# Patient Record
Sex: MALE | Race: WHITE | Employment: OTHER | URBAN - METROPOLITAN AREA
[De-identification: names, ages, dates, MRNs, and addresses within clinical notes are randomized per-mention and may not be internally consistent; named-entity substitution may affect disease eponyms.]

---

## 2018-04-16 ENCOUNTER — HOSPITAL ENCOUNTER (OUTPATIENT)
Dept: PREADMISSION TESTING | Age: 63
Discharge: HOME | End: 2018-04-16
Payer: COMMERCIAL

## 2018-04-16 VITALS
WEIGHT: 275 LBS | OXYGEN SATURATION: 95 % | HEIGHT: 69 IN | TEMPERATURE: 98 F | BODY MASS INDEX: 40.73 KG/M2 | HEART RATE: 74 BPM | DIASTOLIC BLOOD PRESSURE: 81 MMHG | SYSTOLIC BLOOD PRESSURE: 137 MMHG

## 2018-04-16 LAB
ABO + RH BLD: NORMAL
ANION GAP SERPL CALC-SCNC: 7 MMOL/L (ref 5–15)
APPEARANCE UR: CLEAR
BACTERIA URNS QL MICRO: NEGATIVE /HPF
BILIRUB UR QL: NEGATIVE
BLOOD GROUP ANTIBODIES SERPL: NORMAL
BUN SERPL-MCNC: 18 MG/DL (ref 6–20)
BUN/CREAT SERPL: 16 (ref 12–20)
CALCIUM SERPL-MCNC: 9.1 MG/DL (ref 8.5–10.1)
CHLORIDE SERPL-SCNC: 104 MMOL/L (ref 97–108)
CO2 SERPL-SCNC: 29 MMOL/L (ref 21–32)
COLOR UR: NORMAL
CREAT SERPL-MCNC: 1.14 MG/DL (ref 0.7–1.3)
EPITH CASTS URNS QL MICRO: NORMAL /LPF
ERYTHROCYTE [DISTWIDTH] IN BLOOD BY AUTOMATED COUNT: 13.1 % (ref 11.5–14.5)
EST. AVERAGE GLUCOSE BLD GHB EST-MCNC: 120 MG/DL
GLUCOSE SERPL-MCNC: 103 MG/DL (ref 65–100)
GLUCOSE UR STRIP.AUTO-MCNC: NEGATIVE MG/DL
HBA1C MFR BLD: 5.8 % (ref 4.2–6.3)
HCT VFR BLD AUTO: 44.2 % (ref 36.6–50.3)
HGB BLD-MCNC: 14.8 G/DL (ref 12.1–17)
HGB UR QL STRIP: NEGATIVE
INR PPP: 1 (ref 0.9–1.1)
KETONES UR QL STRIP.AUTO: NEGATIVE MG/DL
LEUKOCYTE ESTERASE UR QL STRIP.AUTO: NEGATIVE
MCH RBC QN AUTO: 31.3 PG (ref 26–34)
MCHC RBC AUTO-ENTMCNC: 33.5 G/DL (ref 30–36.5)
MCV RBC AUTO: 93.4 FL (ref 80–99)
NITRITE UR QL STRIP.AUTO: NEGATIVE
NRBC # BLD: 0 K/UL (ref 0–0.01)
NRBC BLD-RTO: 0 PER 100 WBC
PH UR STRIP: 6.5 [PH]
PLATELET # BLD AUTO: 234 K/UL (ref 150–400)
PMV BLD AUTO: 9.6 FL (ref 8.9–12.9)
POTASSIUM SERPL-SCNC: 4.1 MMOL/L (ref 3.5–5.1)
PROT UR STRIP-MCNC: NEGATIVE MG/DL
PROTHROMBIN TIME: 10.2 SEC (ref 9–11.1)
RBC # BLD AUTO: 4.73 M/UL (ref 4.1–5.7)
RBC #/AREA URNS HPF: NORMAL /HPF (ref 0–5)
SODIUM SERPL-SCNC: 140 MMOL/L (ref 136–145)
SP GR UR REFRACTOMETRY: 1.02
SPECIMEN EXP DATE BLD: NORMAL
UA: UC IF INDICATED,UAUC: NORMAL
UROBILINOGEN UR QL STRIP.AUTO: 0.2 EU/DL
WBC # BLD AUTO: 7.8 K/UL (ref 4.1–11.1)
WBC URNS QL MICRO: NORMAL /HPF (ref 0–4)

## 2018-04-16 PROCEDURE — 85610 PROTHROMBIN TIME: CPT | Performed by: ORTHOPAEDIC SURGERY

## 2018-04-16 PROCEDURE — 85027 COMPLETE CBC AUTOMATED: CPT | Performed by: ORTHOPAEDIC SURGERY

## 2018-04-16 PROCEDURE — 80048 BASIC METABOLIC PNL TOTAL CA: CPT | Performed by: ORTHOPAEDIC SURGERY

## 2018-04-16 PROCEDURE — 86900 BLOOD TYPING SEROLOGIC ABO: CPT | Performed by: ORTHOPAEDIC SURGERY

## 2018-04-16 PROCEDURE — 83036 HEMOGLOBIN GLYCOSYLATED A1C: CPT | Performed by: ORTHOPAEDIC SURGERY

## 2018-04-16 PROCEDURE — 81001 URINALYSIS AUTO W/SCOPE: CPT | Performed by: ORTHOPAEDIC SURGERY

## 2018-04-16 RX ORDER — ASPIRIN 81 MG/1
81 TABLET ORAL DAILY
COMMUNITY
End: 2018-04-24

## 2018-04-16 RX ORDER — PRAVASTATIN SODIUM 20 MG/1
20 TABLET ORAL DAILY
COMMUNITY

## 2018-04-16 RX ORDER — VALSARTAN AND HYDROCHLOROTHIAZIDE 80; 12.5 MG/1; MG/1
1 TABLET, FILM COATED ORAL DAILY
COMMUNITY
End: 2020-06-11

## 2018-04-16 RX ORDER — LEVOTHYROXINE SODIUM 100 UG/1
100 TABLET ORAL
COMMUNITY

## 2018-04-16 RX ORDER — IBUPROFEN 200 MG
200 TABLET ORAL
COMMUNITY
End: 2018-04-24

## 2018-04-16 RX ORDER — CLOBETASOL PROPIONATE 0.5 MG/ML
LOTION TOPICAL AS NEEDED
COMMUNITY
End: 2022-06-30

## 2018-04-16 RX ORDER — MINERAL OIL
180 ENEMA (ML) RECTAL
COMMUNITY
End: 2020-06-11

## 2018-04-17 LAB
BACTERIA SPEC CULT: NORMAL
BACTERIA SPEC CULT: NORMAL
SERVICE CMNT-IMP: NORMAL

## 2018-04-18 RX ORDER — CEFAZOLIN SODIUM/WATER 2 G/20 ML
2 SYRINGE (ML) INTRAVENOUS ONCE
Status: CANCELLED | OUTPATIENT
Start: 2018-04-23 | End: 2018-04-23

## 2018-04-18 RX ORDER — CELECOXIB 200 MG/1
200 CAPSULE ORAL ONCE
Status: CANCELLED | OUTPATIENT
Start: 2018-04-23 | End: 2018-04-23

## 2018-04-18 RX ORDER — PREGABALIN 75 MG/1
75 CAPSULE ORAL ONCE
Status: CANCELLED | OUTPATIENT
Start: 2018-04-23 | End: 2018-04-23

## 2018-04-18 RX ORDER — ACETAMINOPHEN 500 MG
1000 TABLET ORAL ONCE
Status: CANCELLED | OUTPATIENT
Start: 2018-04-23 | End: 2018-04-23

## 2018-04-18 RX ORDER — DEXAMETHASONE SODIUM PHOSPHATE 10 MG/ML
4 INJECTION INTRAMUSCULAR; INTRAVENOUS ONCE
Status: CANCELLED | OUTPATIENT
Start: 2018-04-23 | End: 2018-04-23

## 2018-04-22 ENCOUNTER — ANESTHESIA EVENT (OUTPATIENT)
Dept: SURGERY | Age: 63
End: 2018-04-22
Payer: COMMERCIAL

## 2018-04-23 ENCOUNTER — ANESTHESIA (OUTPATIENT)
Dept: SURGERY | Age: 63
End: 2018-04-23
Payer: COMMERCIAL

## 2018-04-23 ENCOUNTER — HOSPITAL ENCOUNTER (OUTPATIENT)
Age: 63
Setting detail: OBSERVATION
Discharge: HOME | End: 2018-04-24
Attending: ORTHOPAEDIC SURGERY | Admitting: ORTHOPAEDIC SURGERY
Payer: COMMERCIAL

## 2018-04-23 DIAGNOSIS — M17.12 PRIMARY OSTEOARTHRITIS OF LEFT KNEE: Primary | ICD-10-CM

## 2018-04-23 LAB
GLUCOSE BLD STRIP.AUTO-MCNC: 103 MG/DL (ref 65–100)
SERVICE CMNT-IMP: ABNORMAL

## 2018-04-23 PROCEDURE — 74011000250 HC RX REV CODE- 250: Performed by: ORTHOPAEDIC SURGERY

## 2018-04-23 PROCEDURE — 77030028224 HC PDNG CST BSNM -A: Performed by: ORTHOPAEDIC SURGERY

## 2018-04-23 PROCEDURE — 77030010783 HC BOWL MX BN CEM J&J -B: Performed by: ORTHOPAEDIC SURGERY

## 2018-04-23 PROCEDURE — 77030039266 HC ADH SKN EXOFIN S2SG -A: Performed by: ORTHOPAEDIC SURGERY

## 2018-04-23 PROCEDURE — 77030020782 HC GWN BAIR PAWS FLX 3M -B

## 2018-04-23 PROCEDURE — C1776 JOINT DEVICE (IMPLANTABLE): HCPCS | Performed by: ORTHOPAEDIC SURGERY

## 2018-04-23 PROCEDURE — 76010000172 HC OR TIME 2.5 TO 3 HR INTENSV-TIER 1: Performed by: ORTHOPAEDIC SURGERY

## 2018-04-23 PROCEDURE — 76210000016 HC OR PH I REC 1 TO 1.5 HR: Performed by: ORTHOPAEDIC SURGERY

## 2018-04-23 PROCEDURE — 77030034850: Performed by: ORTHOPAEDIC SURGERY

## 2018-04-23 PROCEDURE — 97161 PT EVAL LOW COMPLEX 20 MIN: CPT

## 2018-04-23 PROCEDURE — 77030000032 HC CUF TRNQT ZIMM -B: Performed by: ORTHOPAEDIC SURGERY

## 2018-04-23 PROCEDURE — C9290 INJ, BUPIVACAINE LIPOSOME: HCPCS | Performed by: ORTHOPAEDIC SURGERY

## 2018-04-23 PROCEDURE — 77030018846 HC SOL IRR STRL H20 ICUM -A: Performed by: ORTHOPAEDIC SURGERY

## 2018-04-23 PROCEDURE — 77030003601 HC NDL NRV BLK BBMI -A

## 2018-04-23 PROCEDURE — 77030035236 HC SUT PDS STRATFX BARB J&J -B: Performed by: ORTHOPAEDIC SURGERY

## 2018-04-23 PROCEDURE — 74011250636 HC RX REV CODE- 250/636: Performed by: ORTHOPAEDIC SURGERY

## 2018-04-23 PROCEDURE — 77030018836 HC SOL IRR NACL ICUM -A: Performed by: ORTHOPAEDIC SURGERY

## 2018-04-23 PROCEDURE — 77030011943: Performed by: ORTHOPAEDIC SURGERY

## 2018-04-23 PROCEDURE — 76060000036 HC ANESTHESIA 2.5 TO 3 HR: Performed by: ORTHOPAEDIC SURGERY

## 2018-04-23 PROCEDURE — 74011250637 HC RX REV CODE- 250/637: Performed by: ORTHOPAEDIC SURGERY

## 2018-04-23 PROCEDURE — 74011000250 HC RX REV CODE- 250

## 2018-04-23 PROCEDURE — 77030031139 HC SUT VCRL2 J&J -A: Performed by: ORTHOPAEDIC SURGERY

## 2018-04-23 PROCEDURE — 77030020788: Performed by: ORTHOPAEDIC SURGERY

## 2018-04-23 PROCEDURE — 51798 US URINE CAPACITY MEASURE: CPT

## 2018-04-23 PROCEDURE — 74011250636 HC RX REV CODE- 250/636

## 2018-04-23 PROCEDURE — 77030007866 HC KT SPN ANES BBMI -B: Performed by: NURSE ANESTHETIST, CERTIFIED REGISTERED

## 2018-04-23 PROCEDURE — 82962 GLUCOSE BLOOD TEST: CPT

## 2018-04-23 PROCEDURE — 77030012935 HC DRSG AQUACEL BMS -B: Performed by: ORTHOPAEDIC SURGERY

## 2018-04-23 PROCEDURE — C1713 ANCHOR/SCREW BN/BN,TIS/BN: HCPCS | Performed by: ORTHOPAEDIC SURGERY

## 2018-04-23 PROCEDURE — 99218 HC RM OBSERVATION: CPT

## 2018-04-23 PROCEDURE — 77030006822 HC BLD SAW SAG BRSM -B: Performed by: ORTHOPAEDIC SURGERY

## 2018-04-23 PROCEDURE — 77030018842 HC SOL IRR SOD CL 9% BAXT -A: Performed by: ORTHOPAEDIC SURGERY

## 2018-04-23 PROCEDURE — 74011250636 HC RX REV CODE- 250/636: Performed by: ANESTHESIOLOGY

## 2018-04-23 PROCEDURE — 74011000258 HC RX REV CODE- 258: Performed by: ORTHOPAEDIC SURGERY

## 2018-04-23 PROCEDURE — 97116 GAIT TRAINING THERAPY: CPT

## 2018-04-23 PROCEDURE — 77030011640 HC PAD GRND REM COVD -A: Performed by: ORTHOPAEDIC SURGERY

## 2018-04-23 DEVICE — IMPLANTABLE DEVICE: Type: IMPLANTABLE DEVICE | Site: KNEE | Status: FUNCTIONAL

## 2018-04-23 DEVICE — CEMENT BNE 40GM FULL DOSE PMMA W/ GENT HI VISC RADPQ LNG: Type: IMPLANTABLE DEVICE | Site: KNEE | Status: FUNCTIONAL

## 2018-04-23 DEVICE — PAT ASYM TRIATHLN X3 38X11MM -- TRIATHLON ASYMMETRIC X3: Type: IMPLANTABLE DEVICE | Site: KNEE | Status: FUNCTIONAL

## 2018-04-23 DEVICE — COMPONENT KNEE CEM X3 TRIATHLON: Type: IMPLANTABLE DEVICE | Status: FUNCTIONAL

## 2018-04-23 RX ORDER — ACETAMINOPHEN 325 MG/1
650 TABLET ORAL EVERY 6 HOURS
Status: DISCONTINUED | OUTPATIENT
Start: 2018-04-23 | End: 2018-04-24 | Stop reason: HOSPADM

## 2018-04-23 RX ORDER — FACIAL-BODY WIPES
10 EACH TOPICAL DAILY PRN
Status: DISCONTINUED | OUTPATIENT
Start: 2018-04-25 | End: 2018-04-24 | Stop reason: HOSPADM

## 2018-04-23 RX ORDER — CEFAZOLIN SODIUM IN 0.9 % NACL 2 G/100 ML
PLASTIC BAG, INJECTION (ML) INTRAVENOUS AS NEEDED
Status: DISCONTINUED | OUTPATIENT
Start: 2018-04-23 | End: 2018-04-23 | Stop reason: HOSPADM

## 2018-04-23 RX ORDER — TRAMADOL HYDROCHLORIDE 50 MG/1
50 TABLET ORAL EVERY 6 HOURS
Status: DISCONTINUED | OUTPATIENT
Start: 2018-04-23 | End: 2018-04-24 | Stop reason: HOSPADM

## 2018-04-23 RX ORDER — PREGABALIN 75 MG/1
75 CAPSULE ORAL ONCE
Status: COMPLETED | OUTPATIENT
Start: 2018-04-23 | End: 2018-04-23

## 2018-04-23 RX ORDER — SODIUM CHLORIDE 0.9 % (FLUSH) 0.9 %
5-10 SYRINGE (ML) INJECTION EVERY 8 HOURS
Status: DISCONTINUED | OUTPATIENT
Start: 2018-04-23 | End: 2018-04-23 | Stop reason: HOSPADM

## 2018-04-23 RX ORDER — KETOROLAC TROMETHAMINE 30 MG/ML
30 INJECTION, SOLUTION INTRAMUSCULAR; INTRAVENOUS EVERY 6 HOURS
Status: DISCONTINUED | OUTPATIENT
Start: 2018-04-23 | End: 2018-04-23

## 2018-04-23 RX ORDER — OXYCODONE HYDROCHLORIDE 5 MG/1
5 TABLET ORAL
Status: DISCONTINUED | OUTPATIENT
Start: 2018-04-23 | End: 2018-04-24 | Stop reason: HOSPADM

## 2018-04-23 RX ORDER — HYDROCHLOROTHIAZIDE 25 MG/1
12.5 TABLET ORAL DAILY
Status: DISCONTINUED | OUTPATIENT
Start: 2018-04-24 | End: 2018-04-24 | Stop reason: HOSPADM

## 2018-04-23 RX ORDER — FENTANYL CITRATE 50 UG/ML
25 INJECTION, SOLUTION INTRAMUSCULAR; INTRAVENOUS
Status: DISCONTINUED | OUTPATIENT
Start: 2018-04-23 | End: 2018-04-23 | Stop reason: HOSPADM

## 2018-04-23 RX ORDER — FAMOTIDINE 20 MG/1
20 TABLET, FILM COATED ORAL 2 TIMES DAILY
Status: DISCONTINUED | OUTPATIENT
Start: 2018-04-23 | End: 2018-04-24 | Stop reason: HOSPADM

## 2018-04-23 RX ORDER — DIPHENHYDRAMINE HYDROCHLORIDE 50 MG/ML
12.5 INJECTION, SOLUTION INTRAMUSCULAR; INTRAVENOUS AS NEEDED
Status: DISCONTINUED | OUTPATIENT
Start: 2018-04-23 | End: 2018-04-23 | Stop reason: HOSPADM

## 2018-04-23 RX ORDER — ASPIRIN 325 MG
325 TABLET, DELAYED RELEASE (ENTERIC COATED) ORAL 2 TIMES DAILY
Status: DISCONTINUED | OUTPATIENT
Start: 2018-04-23 | End: 2018-04-24 | Stop reason: HOSPADM

## 2018-04-23 RX ORDER — CEFAZOLIN SODIUM 2 G/50ML
2 SOLUTION INTRAVENOUS ONCE
Status: DISCONTINUED | OUTPATIENT
Start: 2018-04-23 | End: 2018-04-23 | Stop reason: CLARIF

## 2018-04-23 RX ORDER — GLYCOPYRROLATE 0.2 MG/ML
INJECTION INTRAMUSCULAR; INTRAVENOUS AS NEEDED
Status: DISCONTINUED | OUTPATIENT
Start: 2018-04-23 | End: 2018-04-23 | Stop reason: HOSPADM

## 2018-04-23 RX ORDER — SODIUM CHLORIDE 0.9 % (FLUSH) 0.9 %
5-10 SYRINGE (ML) INJECTION AS NEEDED
Status: DISCONTINUED | OUTPATIENT
Start: 2018-04-23 | End: 2018-04-24 | Stop reason: HOSPADM

## 2018-04-23 RX ORDER — KETAMINE HYDROCHLORIDE 10 MG/ML
INJECTION, SOLUTION INTRAMUSCULAR; INTRAVENOUS AS NEEDED
Status: DISCONTINUED | OUTPATIENT
Start: 2018-04-23 | End: 2018-04-23

## 2018-04-23 RX ORDER — MIDAZOLAM HYDROCHLORIDE 1 MG/ML
1 INJECTION, SOLUTION INTRAMUSCULAR; INTRAVENOUS AS NEEDED
Status: DISCONTINUED | OUTPATIENT
Start: 2018-04-23 | End: 2018-04-23 | Stop reason: HOSPADM

## 2018-04-23 RX ORDER — BUPIVACAINE HYDROCHLORIDE 5 MG/ML
INJECTION, SOLUTION EPIDURAL; INTRACAUDAL AS NEEDED
Status: DISCONTINUED | OUTPATIENT
Start: 2018-04-23 | End: 2018-04-23 | Stop reason: HOSPADM

## 2018-04-23 RX ORDER — DEXAMETHASONE SODIUM PHOSPHATE 4 MG/ML
INJECTION, SOLUTION INTRA-ARTICULAR; INTRALESIONAL; INTRAMUSCULAR; INTRAVENOUS; SOFT TISSUE AS NEEDED
Status: DISCONTINUED | OUTPATIENT
Start: 2018-04-23 | End: 2018-04-23 | Stop reason: HOSPADM

## 2018-04-23 RX ORDER — SODIUM CHLORIDE 9 MG/ML
25 INJECTION, SOLUTION INTRAVENOUS CONTINUOUS
Status: DISCONTINUED | OUTPATIENT
Start: 2018-04-23 | End: 2018-04-23 | Stop reason: HOSPADM

## 2018-04-23 RX ORDER — CEFAZOLIN SODIUM 2 G/50ML
2 SOLUTION INTRAVENOUS EVERY 8 HOURS
Status: COMPLETED | OUTPATIENT
Start: 2018-04-23 | End: 2018-04-24

## 2018-04-23 RX ORDER — LIDOCAINE HYDROCHLORIDE 10 MG/ML
0.1 INJECTION, SOLUTION EPIDURAL; INFILTRATION; INTRACAUDAL; PERINEURAL AS NEEDED
Status: DISCONTINUED | OUTPATIENT
Start: 2018-04-23 | End: 2018-04-23 | Stop reason: HOSPADM

## 2018-04-23 RX ORDER — DICLOFENAC SODIUM 50 MG/1
50 TABLET, DELAYED RELEASE ORAL 2 TIMES DAILY
Qty: 60 TAB | Refills: 0 | Status: SHIPPED | OUTPATIENT
Start: 2018-04-23 | End: 2020-06-11

## 2018-04-23 RX ORDER — MIDAZOLAM HYDROCHLORIDE 1 MG/ML
0.5 INJECTION, SOLUTION INTRAMUSCULAR; INTRAVENOUS
Status: DISCONTINUED | OUTPATIENT
Start: 2018-04-23 | End: 2018-04-23 | Stop reason: HOSPADM

## 2018-04-23 RX ORDER — CELECOXIB 200 MG/1
200 CAPSULE ORAL ONCE
Status: COMPLETED | OUTPATIENT
Start: 2018-04-23 | End: 2018-04-23

## 2018-04-23 RX ORDER — ACETAMINOPHEN 500 MG
1000 TABLET ORAL ONCE
Status: COMPLETED | OUTPATIENT
Start: 2018-04-23 | End: 2018-04-23

## 2018-04-23 RX ORDER — ONDANSETRON 2 MG/ML
4 INJECTION INTRAMUSCULAR; INTRAVENOUS AS NEEDED
Status: DISCONTINUED | OUTPATIENT
Start: 2018-04-23 | End: 2018-04-23 | Stop reason: HOSPADM

## 2018-04-23 RX ORDER — TRAMADOL HYDROCHLORIDE 50 MG/1
50 TABLET ORAL EVERY 6 HOURS
Qty: 60 TAB | Refills: 0 | Status: SHIPPED | OUTPATIENT
Start: 2018-04-24 | End: 2020-06-11

## 2018-04-23 RX ORDER — TRANEXAMIC ACID 100 MG/ML
INJECTION, SOLUTION INTRAVENOUS AS NEEDED
Status: DISCONTINUED | OUTPATIENT
Start: 2018-04-23 | End: 2018-04-23 | Stop reason: HOSPADM

## 2018-04-23 RX ORDER — DICLOFENAC SODIUM 50 MG/1
50 TABLET, DELAYED RELEASE ORAL 2 TIMES DAILY
Status: DISCONTINUED | OUTPATIENT
Start: 2018-04-24 | End: 2018-04-24 | Stop reason: HOSPADM

## 2018-04-23 RX ORDER — ONDANSETRON 2 MG/ML
4 INJECTION INTRAMUSCULAR; INTRAVENOUS
Status: ACTIVE | OUTPATIENT
Start: 2018-04-23 | End: 2018-04-24

## 2018-04-23 RX ORDER — LIDOCAINE HYDROCHLORIDE 20 MG/ML
INJECTION, SOLUTION EPIDURAL; INFILTRATION; INTRACAUDAL; PERINEURAL AS NEEDED
Status: DISCONTINUED | OUTPATIENT
Start: 2018-04-23 | End: 2018-04-23 | Stop reason: HOSPADM

## 2018-04-23 RX ORDER — THERA TABS 400 MCG
1 TAB ORAL DAILY
Status: DISCONTINUED | OUTPATIENT
Start: 2018-04-24 | End: 2018-04-24 | Stop reason: HOSPADM

## 2018-04-23 RX ORDER — OXYCODONE AND ACETAMINOPHEN 5; 325 MG/1; MG/1
1 TABLET ORAL AS NEEDED
Status: DISCONTINUED | OUTPATIENT
Start: 2018-04-23 | End: 2018-04-23 | Stop reason: HOSPADM

## 2018-04-23 RX ORDER — ASPIRIN 325 MG
325 TABLET, DELAYED RELEASE (ENTERIC COATED) ORAL 2 TIMES DAILY
Qty: 60 TAB | Refills: 0 | Status: SHIPPED | OUTPATIENT
Start: 2018-04-24 | End: 2020-06-11

## 2018-04-23 RX ORDER — EPHEDRINE SULFATE 50 MG/ML
INJECTION, SOLUTION INTRAVENOUS AS NEEDED
Status: DISCONTINUED | OUTPATIENT
Start: 2018-04-23 | End: 2018-04-23 | Stop reason: HOSPADM

## 2018-04-23 RX ORDER — SODIUM CHLORIDE, SODIUM LACTATE, POTASSIUM CHLORIDE, CALCIUM CHLORIDE 600; 310; 30; 20 MG/100ML; MG/100ML; MG/100ML; MG/100ML
125 INJECTION, SOLUTION INTRAVENOUS CONTINUOUS
Status: DISCONTINUED | OUTPATIENT
Start: 2018-04-23 | End: 2018-04-23 | Stop reason: HOSPADM

## 2018-04-23 RX ORDER — NALOXONE HYDROCHLORIDE 0.4 MG/ML
0.4 INJECTION, SOLUTION INTRAMUSCULAR; INTRAVENOUS; SUBCUTANEOUS AS NEEDED
Status: DISCONTINUED | OUTPATIENT
Start: 2018-04-23 | End: 2018-04-24 | Stop reason: HOSPADM

## 2018-04-23 RX ORDER — LEVOTHYROXINE SODIUM 100 UG/1
100 TABLET ORAL
Status: DISCONTINUED | OUTPATIENT
Start: 2018-04-24 | End: 2018-04-24 | Stop reason: HOSPADM

## 2018-04-23 RX ORDER — FAMOTIDINE 20 MG/1
20 TABLET, FILM COATED ORAL 2 TIMES DAILY
Qty: 60 TAB | Refills: 0 | Status: SHIPPED | OUTPATIENT
Start: 2018-04-24 | End: 2020-06-11

## 2018-04-23 RX ORDER — POLYETHYLENE GLYCOL 3350 17 G/17G
17 POWDER, FOR SOLUTION ORAL DAILY
Status: DISCONTINUED | OUTPATIENT
Start: 2018-04-24 | End: 2018-04-24 | Stop reason: HOSPADM

## 2018-04-23 RX ORDER — PROPOFOL 10 MG/ML
INJECTION, EMULSION INTRAVENOUS
Status: DISCONTINUED | OUTPATIENT
Start: 2018-04-23 | End: 2018-04-23 | Stop reason: HOSPADM

## 2018-04-23 RX ORDER — FENTANYL CITRATE 50 UG/ML
50 INJECTION, SOLUTION INTRAMUSCULAR; INTRAVENOUS AS NEEDED
Status: DISCONTINUED | OUTPATIENT
Start: 2018-04-23 | End: 2018-04-23 | Stop reason: HOSPADM

## 2018-04-23 RX ORDER — PROPOFOL 10 MG/ML
INJECTION, EMULSION INTRAVENOUS AS NEEDED
Status: DISCONTINUED | OUTPATIENT
Start: 2018-04-23 | End: 2018-04-23 | Stop reason: HOSPADM

## 2018-04-23 RX ORDER — LOSARTAN POTASSIUM 50 MG/1
50 TABLET ORAL DAILY
Status: DISCONTINUED | OUTPATIENT
Start: 2018-04-24 | End: 2018-04-24 | Stop reason: HOSPADM

## 2018-04-23 RX ORDER — SODIUM CHLORIDE 9 MG/ML
125 INJECTION, SOLUTION INTRAVENOUS CONTINUOUS
Status: DISPENSED | OUTPATIENT
Start: 2018-04-23 | End: 2018-04-24

## 2018-04-23 RX ORDER — AMOXICILLIN 250 MG
1 CAPSULE ORAL 2 TIMES DAILY
Status: DISCONTINUED | OUTPATIENT
Start: 2018-04-23 | End: 2018-04-24 | Stop reason: HOSPADM

## 2018-04-23 RX ORDER — DEXAMETHASONE SODIUM PHOSPHATE 4 MG/ML
4 INJECTION, SOLUTION INTRA-ARTICULAR; INTRALESIONAL; INTRAMUSCULAR; INTRAVENOUS; SOFT TISSUE ONCE
Status: DISCONTINUED | OUTPATIENT
Start: 2018-04-23 | End: 2018-04-23 | Stop reason: HOSPADM

## 2018-04-23 RX ORDER — SODIUM CHLORIDE 0.9 % (FLUSH) 0.9 %
5-10 SYRINGE (ML) INJECTION AS NEEDED
Status: DISCONTINUED | OUTPATIENT
Start: 2018-04-23 | End: 2018-04-23 | Stop reason: HOSPADM

## 2018-04-23 RX ORDER — SODIUM CHLORIDE 0.9 % (FLUSH) 0.9 %
5-10 SYRINGE (ML) INJECTION EVERY 8 HOURS
Status: DISCONTINUED | OUTPATIENT
Start: 2018-04-24 | End: 2018-04-24 | Stop reason: HOSPADM

## 2018-04-23 RX ORDER — PRAVASTATIN SODIUM 20 MG/1
20 TABLET ORAL
Status: DISCONTINUED | OUTPATIENT
Start: 2018-04-23 | End: 2018-04-24 | Stop reason: HOSPADM

## 2018-04-23 RX ORDER — VANCOMYCIN 2 GRAM/500 ML IN 0.9 % SODIUM CHLORIDE INTRAVENOUS
2000 ONCE
Status: DISCONTINUED | OUTPATIENT
Start: 2018-04-23 | End: 2018-04-23

## 2018-04-23 RX ORDER — SODIUM CHLORIDE, SODIUM LACTATE, POTASSIUM CHLORIDE, CALCIUM CHLORIDE 600; 310; 30; 20 MG/100ML; MG/100ML; MG/100ML; MG/100ML
INJECTION, SOLUTION INTRAVENOUS
Status: DISCONTINUED | OUTPATIENT
Start: 2018-04-23 | End: 2018-04-23 | Stop reason: HOSPADM

## 2018-04-23 RX ORDER — PHENYLEPHRINE HCL IN 0.9% NACL 0.4MG/10ML
SYRINGE (ML) INTRAVENOUS AS NEEDED
Status: DISCONTINUED | OUTPATIENT
Start: 2018-04-23 | End: 2018-04-23 | Stop reason: HOSPADM

## 2018-04-23 RX ORDER — OXYCODONE HYDROCHLORIDE 5 MG/1
5 TABLET ORAL
Qty: 60 TAB | Refills: 0 | Status: SHIPPED | OUTPATIENT
Start: 2018-04-23 | End: 2020-06-11

## 2018-04-23 RX ORDER — HYDROXYZINE HYDROCHLORIDE 10 MG/1
10 TABLET, FILM COATED ORAL
Status: DISCONTINUED | OUTPATIENT
Start: 2018-04-23 | End: 2018-04-24 | Stop reason: HOSPADM

## 2018-04-23 RX ADMIN — ACETAMINOPHEN 650 MG: 325 TABLET, FILM COATED ORAL at 16:16

## 2018-04-23 RX ADMIN — PREGABALIN 75 MG: 75 CAPSULE ORAL at 09:01

## 2018-04-23 RX ADMIN — DEXAMETHASONE SODIUM PHOSPHATE 4 MG: 4 INJECTION, SOLUTION INTRA-ARTICULAR; INTRALESIONAL; INTRAMUSCULAR; INTRAVENOUS; SOFT TISSUE at 10:25

## 2018-04-23 RX ADMIN — EPHEDRINE SULFATE 10 MG: 50 INJECTION, SOLUTION INTRAVENOUS at 10:23

## 2018-04-23 RX ADMIN — PRAVASTATIN SODIUM 20 MG: 20 TABLET ORAL at 21:41

## 2018-04-23 RX ADMIN — SODIUM CHLORIDE, SODIUM LACTATE, POTASSIUM CHLORIDE, CALCIUM CHLORIDE: 600; 310; 30; 20 INJECTION, SOLUTION INTRAVENOUS at 09:49

## 2018-04-23 RX ADMIN — FENTANYL CITRATE 25 MCG: 50 INJECTION, SOLUTION INTRAMUSCULAR; INTRAVENOUS at 09:12

## 2018-04-23 RX ADMIN — LIDOCAINE HYDROCHLORIDE 60 MG: 20 INJECTION, SOLUTION EPIDURAL; INFILTRATION; INTRACAUDAL; PERINEURAL at 10:15

## 2018-04-23 RX ADMIN — MIDAZOLAM HYDROCHLORIDE 2 MG: 1 INJECTION, SOLUTION INTRAMUSCULAR; INTRAVENOUS at 09:12

## 2018-04-23 RX ADMIN — PROPOFOL 100 MCG/KG/MIN: 10 INJECTION, EMULSION INTRAVENOUS at 10:20

## 2018-04-23 RX ADMIN — SODIUM CHLORIDE, SODIUM LACTATE, POTASSIUM CHLORIDE, AND CALCIUM CHLORIDE 125 ML/HR: 600; 310; 30; 20 INJECTION, SOLUTION INTRAVENOUS at 08:38

## 2018-04-23 RX ADMIN — SODIUM CHLORIDE, SODIUM LACTATE, POTASSIUM CHLORIDE, CALCIUM CHLORIDE: 600; 310; 30; 20 INJECTION, SOLUTION INTRAVENOUS at 12:15

## 2018-04-23 RX ADMIN — ACETAMINOPHEN 650 MG: 325 TABLET, FILM COATED ORAL at 21:41

## 2018-04-23 RX ADMIN — SODIUM CHLORIDE 125 ML/HR: 900 INJECTION, SOLUTION INTRAVENOUS at 14:08

## 2018-04-23 RX ADMIN — CELECOXIB 200 MG: 200 CAPSULE ORAL at 09:01

## 2018-04-23 RX ADMIN — Medication 40 MCG: at 10:16

## 2018-04-23 RX ADMIN — FAMOTIDINE 20 MG: 20 TABLET, FILM COATED ORAL at 17:39

## 2018-04-23 RX ADMIN — TRAMADOL HYDROCHLORIDE 50 MG: 50 TABLET, FILM COATED ORAL at 17:40

## 2018-04-23 RX ADMIN — PROPOFOL 80 MG: 10 INJECTION, EMULSION INTRAVENOUS at 10:15

## 2018-04-23 RX ADMIN — CEFAZOLIN SODIUM 2 G: 2 SOLUTION INTRAVENOUS at 18:00

## 2018-04-23 RX ADMIN — EPHEDRINE SULFATE 10 MG: 50 INJECTION, SOLUTION INTRAVENOUS at 10:38

## 2018-04-23 RX ADMIN — STANDARDIZED SENNA CONCENTRATE AND DOCUSATE SODIUM 1 TABLET: 8.6; 5 TABLET, FILM COATED ORAL at 17:40

## 2018-04-23 RX ADMIN — BUPIVACAINE HYDROCHLORIDE 9.5 MG: 5 INJECTION, SOLUTION EPIDURAL; INTRACAUDAL at 10:16

## 2018-04-23 RX ADMIN — ASPIRIN 325 MG: 325 TABLET, DELAYED RELEASE ORAL at 17:39

## 2018-04-23 RX ADMIN — Medication 3 G: at 10:15

## 2018-04-23 RX ADMIN — ACETAMINOPHEN 1000 MG: 500 TABLET, FILM COATED ORAL at 09:01

## 2018-04-23 RX ADMIN — KETOROLAC TROMETHAMINE 30 MG: 30 INJECTION, SOLUTION INTRAMUSCULAR at 18:23

## 2018-04-23 RX ADMIN — GLYCOPYRROLATE 0.2 MG: 0.2 INJECTION INTRAMUSCULAR; INTRAVENOUS at 10:40

## 2018-04-23 NOTE — PROGRESS NOTES
Problem: Mobility Impaired (Adult and Pediatric)  Goal: *Acute Goals and Plan of Care (Insert Text)  Physical Therapy Goals  Initiated 4/23/2018    1. Patient will move from supine to sit and sit to supine  and scoot up and down in bed with modified independence within 4 days. 2. Patient will perform sit to stand with modified independence within 4 days. 3. Patient will ambulate with contact guard assist for 150 feet with the least restrictive device within 4 days. 4. Patient will ascend/descend 2 stairs with 1 handrail(s) with minimal assistance/contact guard assist within 4 days. 5. Patient will perform home exercise program per protocol with supervision/set-up within 4 days. 6. Patient will demonstrate AROM 0-90 degrees in operative joint within 4 days. physical Therapy knee EVALUATION  Patient: Rloando Mathur (33 y.o. male)  Date: 4/23/2018  Primary Diagnosis: TRICOMPARTMENTAL OSTEOARTHRITIS BILATERAL KNEE   Osteoarthritis of left knee  Procedure(s) (LRB):  LEFT TOTAL KNEE ARTHROPLASTY  (Left) Day of Surgery   Precautions: falls, WBAT LLE       ASSESSMENT :  Based on the objective data described below, the patient presents with minimum complaint of pain (3/10), general decrease in functional mobility, decreased balance per Tinetti Balance Assessment noting that pt is at high risk for falls at present, following his left total knee replacement surgery today. Noted that pt's BP was elevated in sitting and standing, pt asymptomatic, but limited his ambulating with a rolling walker, WBAT LLE, to 8' forward/8' backward this afternoon. Pt to resume his high blood pressure meds soon, and anticipate that he will be able to progress with mobility and ROM of left knee with PT BID. Pt will be able to return home with his wife's assistance and follow up Home Health PT. He should not require any DME at discharge. Patient will benefit from skilled intervention to address the above impairments.   Patients rehabilitation potential is considered to be Good  Factors which may influence rehabilitation potential include:   []         None noted  []         Mental ability/status  [x]         Medical condition  []         Home/family situation and support systems  []         Safety awareness  []         Pain tolerance/management  []         Other:      PLAN :  Recommendations and Planned Interventions:  [x]           Bed Mobility Training             []    Neuromuscular Re-Education  [x]           Transfer Training                   []    Orthotic/Prosthetic Training  [x]           Gait Training                         []    Modalities  [x]           Therapeutic Exercises           []    Edema Management/Control  []           Therapeutic Activities            [x]    Patient and Family Training/Education  []           Other (comment):    Frequency/Duration: Patient will be followed by physical therapy twice daily to address goals. Discharge Recommendations: Home Health PT  Further Equipment Recommendations for Discharge: none     SUBJECTIVE:   Patient stated I know this leg is going to hurt.  Pt rated pain at 3/10 at rest. He was anticipating more pain but was surprised that the discomfort did not escalate during the treatment session. He stated that he felt he could have walked more but therapist limited his walking due to his increased BP once up out of bed.     OBJECTIVE DATA SUMMARY:   HISTORY:    Past Medical History:   Diagnosis Date    Arthritis     Cancer ([facility] [address]     MELANOMA RIGHT EAR    Chronic pain     KNEES, SHOULDERS    GERD (gastroesophageal reflux disease)     Hypertension     Thyroid disease      Past Surgical History:   Procedure Laterality Date    HX COLONOSCOPY  2014    HX HEENT  12/2006    THYROIDECTOMY    HX OTHER SURGICAL Right 05/2010    EXC MELANOMA EAR    HX TONSILLECTOMY  CHILD     Prior Level of Function/Home Situation: independent      Home Situation  Home Environment: Private residence  # Steps to Enter: 2  Rails to Enter: Yes  Hand Rails : Right  One/Two Story Residence: One story  Living Alone: No  Support Systems: Spouse/Significant Other/Partner  Patient Expects to be Discharged to[de-identified] Private residence  Current DME Used/Available at Home: Cane, straight, Crutches, Raised toilet seat, Shower chair, Walker, rolling, Grab bars  Tub or Shower Type: Shower    EXAMINATION/PRESENTATION/DECISION MAKING:   Critical Behavior:  Neurologic State: Alert, Appropriate for age  Orientation Level: Appropriate for age  Cognition: Follows commands  Safety/Judgement: Awareness of environment  Hearing: Auditory  Auditory Impairment: None  Skin:  LLE ace wrapped; fresh ice pack placed over L knee at end of PT session  Range Of Motion:  AROM: Within functional limits (uninvolved joints)                       Strength:    Strength: Within functional limits (uninvolved joints)                    Tone & Sensation:                  Sensation: Intact                         Functional Mobility:  Bed Mobility:     Supine to Sit: Moderate assistance;Assist x1  Sit to Supine:  Moderate assistance;Assist x1     Transfers:  Sit to Stand: Minimum assistance;Assist x1 (with bed elevated)  Stand to Sit: Minimum assistance;Assist x1                       Balance:   Sitting: Intact  Standing: Impaired  Standing - Static: Good  Standing - Dynamic : Fair  Ambulation/Gait Training:  Distance (ft): 16 Feet (ft)  Assistive Device: Gait belt;Walker, rolling  Ambulation - Level of Assistance: Minimal assistance;Assist x2        Gait Abnormalities: Decreased step clearance  Right Side Weight Bearing: Full  Left Side Weight Bearing: As tolerated  Base of Support: Narrowed     Speed/Daya: Slow  Step Length: Right shortened;Left shortened                                   Therapeutic Exercises: in supine, active, 10 reps each BLE:  Ankle pumps, quad sets, hamstring sets      Functional Measure:  Tinetti test:    Sitting Balance: 1  Arises: 1  Attempts to Rise: 2  Immediate Standing Balance: 1  Standing Balance: 1  Nudged: 2  Eyes Closed: 1  Turn 360 Degrees - Continuous/Discontinuous: 0  Turn 360 Degrees - Steady/Unsteady: 1  Sitting Down: 1  Balance Score: 11  Indication of Gait: 0  R Step Length/Height: 0  L Step Length/Height: 0  R Foot Clearance: 1  L Foot Clearance: 1  Step Symmetry: 0  Step Continuity: 0  Path: 1  Trunk: 0  Walking Time: 1  Gait Score: 4  Total Score: 15       Tinetti Test and G-code impairment scale:  Percentage of Impairment CH    0%   CI    1-19% CJ    20-39% CK    40-59% CL    60-79% CM    80-99% CN     100%   Tinetti  Score 0-28 28 23-27 17-22 12-16 6-11 1-5 0       Tinetti Tool Score Risk of Falls  <19 = High Fall Risk  19-24 = Moderate Fall Risk  25-28 = Low Fall Risk  Tinetti ME. Performance-Oriented Assessment of Mobility Problems in Elderly Patients. [facility]; [id number]. (Scoring Description: PT Bulletin Feb. 10, 1993)    Older adults: Bertha Aquino et al, 2009; n = 1000 [facility] elderly evaluated with ABC, ALTA, ADL, and IADL)  · Mean ALTA score for males aged 69-68 years = 26.21(3.40)  · Mean ALTA score for females age 69-68 years = 25.16(4.30)  · Mean ALTA score for males over 80 years = 23.29(6.02)  · Mean ALTA score for females over 80 years = 17.20(8.32)       Pain:  Pain Scale 1: Numeric (0 - 10)  Pain Intensity 1: 3  Pain Location 1: Knee  Pain Orientation 1: Left  Pain Description 1: Aching  Pain Intervention(s) 1: Ice;Repositioned  Activity Tolerance:   Fair - pt rated pain at 3/10, no complaints of dizziness, headache, nausea. Note increased BP upon sitting/standing. Limited pt's ambulation to 8' forward, 8' backwards using a rolling walker, minimum assist x 2. Notified nurse of pt's high BP when pt up out of bed. Please refer to the flowsheet for vital signs taken during this treatment.   After treatment:   []         Patient left in no apparent distress sitting up in chair  [x]         Patient left in no apparent distress in bed  [x]         Call bell left within reach  [x]         Nursing notified  [x]         Caregiver present-wife  []         Bed alarm activated    COMMUNICATION/EDUCATION:   The patients plan of care was discussed with: Registered Nurse. [x]         Fall prevention education was provided and the patient/caregiver indicated understanding. [x]         Patient/family have participated as able in goal setting and plan of care. []         Patient/family agree to work toward stated goals and plan of care. []         Patient understands intent and goals of therapy, but is neutral about his/her participation. []         Patient is unable to participate in goal setting and plan of care.     Thank you for this referral.  Chuy Zayra, PT   Time Calculation: 30 mins

## 2018-04-23 NOTE — PROGRESS NOTES
Problem: Falls - Risk of  Goal: *Absence of Falls  Document Reyna Fall Risk and appropriate interventions in the flowsheet.    Outcome: Progressing Towards Goal  Fall Risk Interventions:  Mobility Interventions: Communicate number of staff needed for ambulation/transfer         Medication Interventions: Evaluate medications/consider consulting pharmacy, Patient to call before getting OOB    Elimination Interventions: Elevated toilet seat

## 2018-04-23 NOTE — IP AVS SNAPSHOT
1111 [facility] [address] [place] [address] 
[phone] Patient: Peter Howe MRN: [id number] About your hospitalization You were admitted on:  April 23, 2018 You last received care in the:  [address] [facility] Sol You were discharged on:  April 24, 2018 Why you were hospitalized Your primary diagnosis was:  Not on File Your diagnoses also included:  Osteoarthritis Of Left Knee Follow-up Information Follow up With Details Comments Contact Info [address], [address] [facility] Moustapha Norirs [address] [phone] ALL ABOUT CARE  For home health physical therapy. 14[address] [facility] [address] 
[phone] Discharge Orders None A check rajesh indicates which time of day the medication should be taken. My Medications START taking these medications Instructions Each Dose to Equal  
 Morning Noon Evening Bedtime  
 diclofenac EC 50 mg EC tablet Commonly known as:  VOLTAREN Your last dose was: Your next dose is: Take 1 Tab by mouth two (2) times a day. 50 mg  
    
   
   
   
  
 famotidine 20 mg tablet Commonly known as:  PEPCID Your last dose was: Your next dose is: Take 1 Tab by mouth two (2) times a day. 20 mg  
    
   
   
   
  
 oxyCODONE IR 5 mg immediate release tablet Commonly known as:  Santo Bers Your last dose was: Your next dose is: Take 1 Tab by mouth every three (3) hours as needed. Max Daily Amount: 40 mg.  
 5 mg  
    
   
   
   
  
 traMADol 50 mg tablet Commonly known as:  ULTRAM  
   
Your last dose was: Your next dose is: Take 1 Tab by mouth every six (6) hours. Max Daily Amount: 200 mg.  
 50 mg CHANGE how you take these medications Instructions Each Dose to Equal  
 Morning Noon Evening Bedtime aspirin delayed-release 325 mg tablet What changed:   
- medication strength 
- how much to take - when to take this Your last dose was: Your next dose is: Take 1 Tab by mouth two (2) times a day. 325 mg CONTINUE taking these medications Instructions Each Dose to Equal  
 Morning Noon Evening Bedtime  
 clobetasol 0.05 % lotion Commonly known as:  Margot [place] Your last dose was: Your next dose is:    
   
   
 Apply  to affected area as needed for Itching. fexofenadine 180 mg tablet Commonly known as:  Phil [place] Your last dose was: Your next dose is: Take 180 mg by mouth daily as needed for Allergies. 180 mg  
    
   
   
   
  
 levothyroxine 100 mcg tablet Commonly known as:  SYNTHROID Your last dose was: Your next dose is: Take 100 mcg by mouth Daily (before breakfast). 100 mcg ONE-A-DAY MEN'S MULTIVITAMIN PO Your last dose was: Your next dose is: Take 1 Tab by mouth daily. 1 Tab  
    
   
   
   
  
 pravastatin 20 mg tablet Commonly known as:  PRAVACHOL Your last dose was: Your next dose is: Take 20 mg by mouth nightly. 20 mg  
    
   
   
   
  
 valsartan-hydroCHLOROthiazide 80-12.5 mg per tablet Commonly known as:  DIOVAN-HCT Your last dose was: Your next dose is: Take 1 Tab by mouth daily. 1 Tab STOP taking these medications ADVIL 200 mg tablet Generic drug:  ibuprofen Where to Get Your Medications Information on where to get these meds will be given to you by the nurse or doctor. ! Ask your nurse or doctor about these medications  
  aspirin delayed-release 325 mg tablet  
 diclofenac EC 50 mg EC tablet  
 famotidine 20 mg tablet oxyCODONE IR 5 mg immediate release tablet  
 traMADol 50 mg tablet Opioid Education Prescription Opioids: What You Need to Know: 
 
Prescription opioids can be used to help relieve moderate-to-severe pain and are often prescribed following a surgery or injury, or for certain health conditions. These medications can be an important part of treatment but also come with serious risks. Opioids are strong pain medicines. Examples include hydrocodone, oxycodone, fentanyl, and morphine. Heroin is an example of an illegal opioid. It is important to work with your health care provider to make sure you are getting the safest, most effective care. WHAT ARE THE RISKS AND SIDE EFFECTS OF OPIOID USE? Prescription opioids carry serious risks of addiction and overdose, especially with prolonged use. An opioid overdose, often marked by slow breathing, can cause sudden death. The use of prescription opioids can have a number of side effects as well, even when taken as directed. · Tolerance-meaning you might need to take more of a medication for the same pain relief · Physical dependence-meaning you have symptoms of withdrawal when the medication is stopped. Withdrawal symptoms can include nausea, sweating, chills, diarrhea, stomach cramps, and muscle aches. Withdrawal can last up to several weeks, depending on which drug you took and how long you took it. · Increased sensitivity to pain · Constipation · Nausea, vomiting, and dry mouth · Sleepiness and dizziness · Confusion · Depression · Low levels of testosterone that can result in lower sex drive, energy, and strength · Itching and sweating RISKS ARE GREATER WITH:      
· History of drug misuse, substance use disorder, or overdose · Mental health conditions (such as depression or anxiety) · Sleep apnea · Older age (72 years or older) · Pregnancy Avoid alcohol while taking prescription opioids.   Also, unless specifically advised by your health care provider, medications to avoid include: · Benzodiazepines (such as Xanax or Valium) · Muscle relaxants (such as Soma or Flexeril) · Hypnotics (such as Ambien or Lunesta) · Other prescription opioids KNOW YOUR OPTIONS Talk to your health care provider about ways to manage your pain that don't involve prescription opioids. Some of these options may actually work better and have fewer risks and side effects. Options may include: 
· Pain relievers such as acetaminophen, ibuprofen, and naproxen · Some medications that are also used for depression or seizures · Physical therapy and exercise · Counseling to help patients learn how to cope better with triggers of pain and stress. · Application of heat or cold compress · Massage therapy · Relaxation techniques Be Informed Make sure you know the name of your medication, how much and how often to take it, and its potential risks & side effects. IF YOU ARE PRESCRIBED OPIOIDS FOR PAIN: 
· Never take opioids in greater amounts or more often than prescribed. Remember the goal is not to be pain-free but to manage your pain at a tolerable level. · Follow up with your primary care provider to: · Work together to create a plan on how to manage your pain. · Talk about ways to help manage your pain that don't involve prescription opioids. · Talk about any and all concerns and side effects. · Help prevent misuse and abuse. · Never sell or share prescription opioids · Help prevent misuse and abuse. · Store prescription opioids in a secure place and out of reach of others (this may include visitors, children, friends, and family). · Safely dispose of unused/unwanted prescription opioids: Find your community drug take-back program or your pharmacy mail-back program, or flush them down the toilet, following guidance from the Food and Drug Administration (www.fda.gov/Drugs/ResourcesForYou). · Visit www.cdc.gov/drugoverdose to learn about the risks of opioid abuse and overdose. · If you believe you may be struggling with addiction, tell your health care provider and ask for guidance or call [address] [employer] at 2-435-971-HELP. Discharge Instructions After Hospital Care Plan:  Discharge Instructions Knee Replacement Dr. Bharati Oliveira Patient Name: Richard Hughes Date of procedure: 4/23/2018 Procedure: Procedure(s): LEFT TOTAL KNEE ARTHROPLASTY Surgeon: Surgeon(s) and Role: Neno Shahid MD - Primary PCP: Marisol Horvath MD 
Date of discharge: No discharge date for patient encounter. Follow up appointments 
-follow up with Dr. Bharati Oliveira in 3 weeks. Call [phone], ext [phone]) to make an appointment. [facility] Agency: ________________________ phone: _____________________ The agency will contact you to arrange dates/times for visits. Please call them if you do not hear from them within 24 hours after you are discharged When to call your Orthopaedic Surgeon: 
-unrelieved pain 
-Signs of infection-if your incision is red; continues to have drainage; drainage has a foul odor or if you have a persistent fever over 101 degrees 
-Signs of a blood clot in your leg-calf pain, tenderness, redness, swelling of lower leg When to call your Primary Care Physician: 
-Concerns about medical conditions such as diabetes, high blood pressure, asthma, congestive heart failure 
-Call if blood sugars are elevated, persistent headache or dizziness, coughing or congestion, constipation or diarrhea, burning with urination, abnormal heart rate When to call [id number] go to the nearest emergency room 
-acute onset of chest pain, shortness of breath, difficulty breathing Activity 
-walk with your walker or crutches putting as much weight on your leg as you can tolerate. Refer to pages 23-31 of your handbook for instructions and pictures 
-do 20 repetitions of each exercise 3 times each day as instructed by the physical therapist.  Refer to pages 32-40 of your handbook for exercise instructions and pictures 
-get up every one hour and walk (except at night when sleeping) 
-do not drive or operate heavy machinery Incision Care 
-the Aquacel (brown, waterproof) surgical dressing is to remain on your knee for 7 days. On the 7th day have someone gently peel the dressing off by carefully lifting the edge and stretching it slightly to break the adhesive seal and leave incision open to air. You may take a shower with the Aquacel dressing in place. Preventing blood clots  
-Take Aspirin 325 mg twice daily as prescribed  by Dr. Juarez Ayala for one month following surgery Pain management - Please take scheduled 650 mg tylenol every 6 hours for 4 weeks - Please take scheduled diclofenac 50 mg twice daily for 4 weeks - Please take tramadol every 6 hours for pain as needed for pain. - For breakthrough please take 5-10 mg oxycodone - While taking aspirin and diclofenac, please take famotidine (PEPCID) as prescribed 20 mg twice daily to prevent stomach ulcers/irritation.  
- If you have a history of stomach ulcers, do not take diclofenac. - Avoid alcoholic beverages while taking pain medication - Do not take any over-the-counter medication for pain except Tylenol (acetaminophen) - Please be aware that many medications contain Tylenol. We do not want you to over medicate so please read the information below as a guide. Do not take more than 4 Grams of Tylenol in a 24 hour - You may place an ice bag on your knee for 15-20 minutes after exercising and as needed throughout the day and night Diet 
-resume usual diet; drink plenty of fluids; eat foods high in fiber 
-you may want to take a stool softener (such as Senokot-S or Colace) to prevent constipation while you are taking pain medication. If constipation occurs, take a laxative (such as Dulcolax tablets, Milk of Magnesia, or a suppository) Home Health Care Protocol (to be followed by [address]) Nursing-per physicians order 
-remove Aquacel dressing at 7 days post-op  
-complete head to toe assessment, vital signs 
-medication reconciliation 
-review pain management 
-manage chronic medical conditions Physical Therapy-per physicians order Weight bearing status: 
Precautions at Admission: Fall, WBAT Left Side Weight Bearing: As tolerated Right Side Weight Bearing: Full Mobility Status: 
Supine to Sit: Modified independent Sit to Stand: Modified independent, Additional time, Adaptive equipment Sit to Supine: Modified independent Gait: 
Distance (ft): 200 Feet (ft) Ambulation - Level of Assistance: Modified independent, Adaptive equipment, Additional time Assistive Device: Gait belt, Walker, rolling Gait Abnormalities: Antalgic, Decreased step clearance, Step to gait (progressed to step through) ADL status overall composite: 
Dressing Assistance: Independent Toilet Transfer : Contact guard assistance Physical Therapy 
-assessment and evaluation-bed mobility; functional transfers (bed, chair, bathroom, stairs); ambulation with equipment, car transfers, safety and ability to get out of house in the event of an emergency 
-review and maintain weight bearing as tolerated 
-discuss pain management 
-review how to do ADLs 
 
-Home Exercise Program-refer to pages 32-40 of the patient handbook for exercises 
-supine exercises-ankle pumps, hamstring sets, quad sets, heel slides, short arc quad sets, long arc quads-prop heel on pillow for stretch, straight leg raise 
-sitting exercises-active knee flexion, passive knee flexion, active knee extension, ankle pumps, bicep curls (use weights as appropriate), triceps pushups, shoulder flexion (use weights as appropriate) -standing exercises holding onto supportive counter-toe raises, heel raises, mini-squats, heel/toe touches with knee bend, marching in place, hamstring curls Physical therapy goals by discharge from [address] [place] [address] Drive ambulation with walker, crutches or cane if needed (level surfaces and   stairs) -Flexion > 90 degrees, extension 0 degrees 
-Daily performance of home exercise program 
-Independent with stair climbing and car transfers 
-Progress to community ambulator (least restrictive assistive device) Introducing [facility]! [employer] introduces [employer] patient portal. Now you can access parts of your medical record, email your doctor's office, and request medication refills online. 1. In your internet browser, go to [email or web address] 2. Click on the First Time User? Click Here link in the Sign In box. You will see the New Member Sign Up page. 3. Enter your [employer] Access Code exactly as it appears below. You will not need to use this code after youve completed the sign-up process. If you do not sign up before the expiration date, you must request a new code. · [employer] Access Code: [email or web address] Expires: 7/15/2018  8:25 AM 
 
4. Enter the last four digits of your Social Security Number (xxxx) and Date of Birth (mm/dd/yyyy) as indicated and click Submit. You will be taken to the next sign-up page. 5. Create a [employer] ID. This will be your [employer] login ID and cannot be changed, so think of one that is secure and easy to remember. 6. Create a [employer] password. You can change your password at any time. 7. Enter your Password Reset Question and Answer. This can be used at a later time if you forget your password. 8. Enter your e-mail address. You will receive e-mail notification when new information is available in [address] 9. Click Sign Up.  You can now view and download portions of your medical record. 10. Click the Download Summary menu link to download a portable copy of your medical information. If you have questions, please visit the Frequently Asked Questions section of the [employer] website. Remember, [employer] is NOT to be used for urgent needs. For medical emergencies, dial 911. Now available from your iPhone and Android! Introducing Reuben Porter As a [facility] patient, I wanted to make you aware of our electronic visit tool called Reuben Porter. [facility] [employer] 24/7 allows you to connect within minutes with a medical provider 24 hours a day, seven days a week via a mobile device or tablet or logging into a secure website from your computer. You can access Reuben Porter from anywhere in the [place]. A virtual visit might be right for you when you have a simple condition and feel like you just dont want to get out of bed, or cant get away from work for an appointment, when your regular [facility] provider is not available (evenings, weekends or holidays), or when youre out of town and need minor care. Electronic visits cost only $49 and if the [facility] [employer] 24/7 provider determines a prescription is needed to treat your condition, one can be electronically transmitted to a nearby pharmacy*. Please take a moment to enroll today if you have not already done so. The enrollment process is free and takes just a few minutes. To enroll, please download the [employer] 24/7 anatoliy to your tablet or phone, or visit [email or web address] to enroll on your computer. And, as an [address] [place] [address] patient with a [employer] account, the results of your visits will be scanned into your electronic medical record and your primary care provider will be able to view the scanned results. We urge you to continue to see your regular [facility] provider for your ongoing medical care.   And while your primary care provider may not be the one available when you seek a Reuben Porter virtual visit, the peace of mind you get from getting a real diagnosis real time can be priceless. For more information on Reuben Porter, view our Frequently Asked Questions (FAQs) at [email or web address]. Sincerely, 
 
Dulce Maria Laurent MD 
Chief Medical Officer NaomyClaudia Sesay *:  certain medications cannot be prescribed via Reuben Porter Providers Seen During Your Hospitalization Provider Specialty Primary office phone Micheal Pickens, [address] [facility] Orthopedic Surgery [phone] Your Primary Care Physician (PCP) Primary Care Physician Office Phone Office Fax Francesco Hanley [phone] You are allergic to the following Allergen Reactions Pcn (Penicillins) Rash Recent Documentation Height Weight BMI Smoking Status 1.753 m 124.7 kg 40.6 kg/m2 Never Smoker Emergency Contacts Name Discharge Info Relation Home Work Mobile [facility] CARE[facility] [3] Spouse [3] [phone] Patient Belongings The following personal items are in your possession at time of discharge: 
  Dental Appliances: None  Visual Aid: None Please provide this summary of care documentation to your next provider. Signatures-by signing, you are acknowledging that this After Visit Summary has been reviewed with you and you have received a copy. Patient Signature:  ____________________________________________________________ Date:  ____________________________________________________________  
  
[facility] Provider Signature:  ____________________________________________________________ Date:  ____________________________________________________________

## 2018-04-23 NOTE — PROGRESS NOTES
TRANSFER - IN REPORT:    Verbal report received from Johanna(felix) on Zohreh Nguyen  being received from PACU(unit) for routine post - op      Report consisted of patients Situation, Background, Assessment and   Recommendations(SBAR). Information from the following report(s) SBAR, Kardex, Intake/Output and MAR was reviewed with the receiving nurse. Opportunity for questions and clarification was provided. Assessment completed upon patients arrival to unit and care assumed.

## 2018-04-23 NOTE — ANESTHESIA POSTPROCEDURE EVALUATION
Post-Anesthesia Evaluation and Assessment    Patient: Soco Stewart MRN: [id number]  SSN: [id number]    YOB: 1955  Age: 60 y.o. Sex: male       Cardiovascular Function/Vital Signs  Visit Vitals    /81    Pulse 77    Temp 36.8 °C (98.2 °F)    Resp 16    Ht 5' 9\" (1.753 m)    Wt 124.7 kg (275 lb)    SpO2 98%    BMI 40.61 kg/m2       Patient is status post No value filed. anesthesia for Procedure(s):  LEFT TOTAL KNEE ARTHROPLASTY . Nausea/Vomiting: None    Postoperative hydration reviewed and adequate. Pain:  Pain Scale 1: Numeric (0 - 10) (04/23/18 1353)  Pain Intensity 1: 4 (04/23/18 1353)   Managed    Neurological Status:   Neuro (WDL): Within Defined Limits (04/23/18 1353)  Neuro  LLE Motor Response: Numbness (04/23/18 1256)   At baseline    Mental Status and Level of Consciousness: Arousable    Pulmonary Status:   O2 Device: Nasal cannula (04/23/18 1353)   Adequate oxygenation and airway patent    Complications related to anesthesia: None    Post-anesthesia assessment completed.  No concerns    Signed By: Sara Cho MD     April 23, 2018

## 2018-04-23 NOTE — IP AVS SNAPSHOT
[address] [address] 
[phone] Patient: Aline Manriquez MRN: [id number] A check rajesh indicates which time of day the medication should be taken. My Medications START taking these medications Instructions Each Dose to Equal  
 Morning Noon Evening Bedtime  
 diclofenac EC 50 mg EC tablet Commonly known as:  VOLTAREN Your last dose was: Your next dose is: Take 1 Tab by mouth two (2) times a day. 50 mg  
    
   
   
   
  
 famotidine 20 mg tablet Commonly known as:  PEPCID Your last dose was: Your next dose is: Take 1 Tab by mouth two (2) times a day. 20 mg  
    
   
   
   
  
 oxyCODONE IR 5 mg immediate release tablet Commonly known as:  Johanny Balk Your last dose was: Your next dose is: Take 1 Tab by mouth every three (3) hours as needed. Max Daily Amount: 40 mg.  
 5 mg  
    
   
   
   
  
 traMADol 50 mg tablet Commonly known as:  ULTRAM  
   
Your last dose was: Your next dose is: Take 1 Tab by mouth every six (6) hours. Max Daily Amount: 200 mg.  
 50 mg CHANGE how you take these medications Instructions Each Dose to Equal  
 Morning Noon Evening Bedtime  
 aspirin delayed-release 325 mg tablet What changed:   
- medication strength 
- how much to take - when to take this Your last dose was: Your next dose is: Take 1 Tab by mouth two (2) times a day. 325 mg CONTINUE taking these medications Instructions Each Dose to Equal  
 Morning Noon Evening Bedtime  
 clobetasol 0.05 % lotion Commonly known as:  Tisha Grange Your last dose was: Your next dose is:    
   
   
 Apply  to affected area as needed for Itching. fexofenadine 180 mg tablet Commonly known as:  Kiya Bowman Your last dose was: Your next dose is: Take 180 mg by mouth daily as needed for Allergies. 180 mg  
    
   
   
   
  
 levothyroxine 100 mcg tablet Commonly known as:  SYNTHROID Your last dose was: Your next dose is: Take 100 mcg by mouth Daily (before breakfast). 100 mcg ONE-A-DAY MEN'S MULTIVITAMIN PO Your last dose was: Your next dose is: Take 1 Tab by mouth daily. 1 Tab  
    
   
   
   
  
 pravastatin 20 mg tablet Commonly known as:  PRAVACHOL Your last dose was: Your next dose is: Take 20 mg by mouth nightly. 20 mg  
    
   
   
   
  
 valsartan-hydroCHLOROthiazide 80-12.5 mg per tablet Commonly known as:  DIOVAN-HCT Your last dose was: Your next dose is: Take 1 Tab by mouth daily. 1 Tab STOP taking these medications ADVIL 200 mg tablet Generic drug:  ibuprofen Where to Get Your Medications Information on where to get these meds will be given to you by the nurse or doctor. ! Ask your nurse or doctor about these medications  
  aspirin delayed-release 325 mg tablet  
 diclofenac EC 50 mg EC tablet  
 famotidine 20 mg tablet  
 oxyCODONE IR 5 mg immediate release tablet  
 traMADol 50 mg tablet

## 2018-04-23 NOTE — ANESTHESIA PREPROCEDURE EVALUATION
Anesthetic History   No history of anesthetic complications            Review of Systems / Medical History  Patient summary reviewed, nursing notes reviewed and pertinent labs reviewed    Pulmonary  Within defined limits                 Neuro/Psych   Within defined limits           Cardiovascular    Hypertension: well controlled              Exercise tolerance: >4 METS     GI/Hepatic/Renal     GERD: well controlled           Endo/Other      Hypothyroidism: well controlled  Obesity and arthritis     Other Findings            Physical Exam    Airway  Mallampati: II  TM Distance: > 6 cm  Neck ROM: normal range of motion   Mouth opening: Normal     Cardiovascular  Regular rate and rhythm,  S1 and S2 normal,  no murmur, click, rub, or gallop             Dental  No notable dental hx       Pulmonary  Breath sounds clear to auscultation               Abdominal  GI exam deferred       Other Findings            Anesthetic Plan    ASA: 2  Anesthesia type: spinal      Post-op pain plan if not by surgeon: peripheral nerve block single    Induction: Intravenous  Anesthetic plan and risks discussed with: Patient

## 2018-04-23 NOTE — H&P
Date of Surgery Update:  Lizabeth Mckenna was seen and examined. History and physical has been reviewed. The patient has been examined. There have been no significant clinical changes since the completion of the originally dated History and Physical.  Patient identified by surgeon; surgical site was confirmed by patient and surgeon.     Signed By: Alexander Angela MD     April 23, 2018 7:23 AM

## 2018-04-23 NOTE — ANESTHESIA PROCEDURE NOTES
Peripheral Block    Start time: 4/23/2018 9:11 AM  End time: 4/23/2018 9:20 AM  Performed by: Tony Alcantara  Authorized by: Tony Alcantara       Pre-procedure: Indications: at surgeon's request and post-op pain management    Preanesthetic Checklist: patient identified, risks and benefits discussed, site marked, timeout performed, anesthesia consent given and patient being monitored    Timeout Time: 09:11          Block Type:   Block Type:   Adductor canal  Laterality:  Left  Monitoring:  Standard ASA monitoring, continuous pulse ox, frequent vital sign checks, heart rate, responsive to questions and oxygen  Injection Technique:  Single shot  Procedures: ultrasound guided    Patient Position: supine  Prep: DuraPrep    Location:  Mid thigh  Needle Type:  Stimuplex  Needle Gauge:  22 G  Needle Localization:  Ultrasound guidance  Medication Injected:  0.5%  ropivacaine  Volume (mL):  20    Assessment:  Number of attempts:  1  Injection Assessment:  Incremental injection every 5 mL, local visualized surrounding nerve on ultrasound, negative aspiration for blood, no paresthesia, no intravascular symptoms, ultrasound image on chart and negative aspiration for CSF  Patient tolerance:  Patient tolerated the procedure well with no immediate complications

## 2018-04-23 NOTE — PERIOP NOTES
TRANSFER - OUT REPORT:    Verbal report given to Nathalia(name) on Gregorio Screws  being transferred to 551(unit) for routine post - op       Report consisted of patients Situation, Background, Assessment and   Recommendations(SBAR). Time Pre op antibiotic given:1015  Anesthesia Stop time: 7011  Pop Present on Transfer to floor:no  Order for Pop on Chart:no  Discharge Prescriptions with Chart:no    Information from the following report(s) SBAR, Kardex, OR Summary, Procedure Summary, Intake/Output, MAR, Recent Results and Med Rec Status was reviewed with the receiving nurse. Opportunity for questions and clarification was provided. Is the patient on 02? YES       L/Min 2       Other     Is the patient on a monitor? NO    Is the nurse transporting with the patient? NO    Surgical Waiting Area notified of patient's transfer from PACU? YES      The following personal items collected during your admission accompanied patient upon transfer:   Dental Appliance: Dental Appliances: None  Vision:    Hearing Aid:    Jewelry:    Clothing:    Other Valuables:    Valuables sent to safe:      Clothes and glasses sent to floor.

## 2018-04-23 NOTE — ANESTHESIA PROCEDURE NOTES
Spinal Block    Start time: 4/23/2018 10:11 AM  End time: 4/23/2018 10:17 AM  Performed by: Meenakshi Fung  Authorized by: Meenakshi Fung     Pre-procedure:   Indications: primary anesthetic  Preanesthetic Checklist: patient identified, risks and benefits discussed, anesthesia consent, site marked, patient being monitored and timeout performed    Timeout Time: 10:11          Spinal Block:   Patient Position:  Seated  Prep Region:  Lumbar  Prep: Betadine      Location:  L3-4  Technique:  Single shot  Local:  Lidocaine 1%  Local Dose (mL):  3    Needle:   Needle Type:  Pencil-tip  Needle Gauge:  25 G  Attempts:  1      Events: CSF confirmed, no blood with aspiration and no paresthesia        Assessment:  Insertion:  Uncomplicated  Patient tolerance:  Patient tolerated the procedure well with no immediate complications

## 2018-04-24 VITALS
HEIGHT: 69 IN | OXYGEN SATURATION: 96 % | DIASTOLIC BLOOD PRESSURE: 79 MMHG | WEIGHT: 274.91 LBS | TEMPERATURE: 97.5 F | BODY MASS INDEX: 40.72 KG/M2 | SYSTOLIC BLOOD PRESSURE: 162 MMHG | RESPIRATION RATE: 18 BRPM | HEART RATE: 79 BPM

## 2018-04-24 LAB
ANION GAP SERPL CALC-SCNC: 8 MMOL/L (ref 5–15)
BUN SERPL-MCNC: 13 MG/DL (ref 6–20)
BUN/CREAT SERPL: 13 (ref 12–20)
CALCIUM SERPL-MCNC: 7.8 MG/DL (ref 8.5–10.1)
CHLORIDE SERPL-SCNC: 108 MMOL/L (ref 97–108)
CO2 SERPL-SCNC: 22 MMOL/L (ref 21–32)
CREAT SERPL-MCNC: 0.99 MG/DL (ref 0.7–1.3)
GLUCOSE SERPL-MCNC: 108 MG/DL (ref 65–100)
HGB BLD-MCNC: 11.9 G/DL (ref 12.1–17)
POTASSIUM SERPL-SCNC: 4.1 MMOL/L (ref 3.5–5.1)
SODIUM SERPL-SCNC: 138 MMOL/L (ref 136–145)

## 2018-04-24 PROCEDURE — G8989 SELF CARE D/C STATUS: HCPCS

## 2018-04-24 PROCEDURE — G8988 SELF CARE GOAL STATUS: HCPCS

## 2018-04-24 PROCEDURE — 97110 THERAPEUTIC EXERCISES: CPT

## 2018-04-24 PROCEDURE — 36415 COLL VENOUS BLD VENIPUNCTURE: CPT | Performed by: ORTHOPAEDIC SURGERY

## 2018-04-24 PROCEDURE — 74011250636 HC RX REV CODE- 250/636: Performed by: ORTHOPAEDIC SURGERY

## 2018-04-24 PROCEDURE — G8987 SELF CARE CURRENT STATUS: HCPCS

## 2018-04-24 PROCEDURE — 97535 SELF CARE MNGMENT TRAINING: CPT

## 2018-04-24 PROCEDURE — 85018 HEMOGLOBIN: CPT | Performed by: ORTHOPAEDIC SURGERY

## 2018-04-24 PROCEDURE — 74011250637 HC RX REV CODE- 250/637: Performed by: ORTHOPAEDIC SURGERY

## 2018-04-24 PROCEDURE — 80048 BASIC METABOLIC PNL TOTAL CA: CPT | Performed by: ORTHOPAEDIC SURGERY

## 2018-04-24 PROCEDURE — 97165 OT EVAL LOW COMPLEX 30 MIN: CPT

## 2018-04-24 PROCEDURE — 99218 HC RM OBSERVATION: CPT

## 2018-04-24 PROCEDURE — 97116 GAIT TRAINING THERAPY: CPT

## 2018-04-24 RX ADMIN — DICLOFENAC SODIUM 50 MG: 50 TABLET, DELAYED RELEASE ORAL at 09:15

## 2018-04-24 RX ADMIN — TRAMADOL HYDROCHLORIDE 50 MG: 50 TABLET, FILM COATED ORAL at 00:42

## 2018-04-24 RX ADMIN — STANDARDIZED SENNA CONCENTRATE AND DOCUSATE SODIUM 1 TABLET: 8.6; 5 TABLET, FILM COATED ORAL at 09:15

## 2018-04-24 RX ADMIN — TRAMADOL HYDROCHLORIDE 50 MG: 50 TABLET, FILM COATED ORAL at 11:53

## 2018-04-24 RX ADMIN — LOSARTAN POTASSIUM 50 MG: 50 TABLET ORAL at 09:15

## 2018-04-24 RX ADMIN — ASPIRIN 325 MG: 325 TABLET, DELAYED RELEASE ORAL at 09:16

## 2018-04-24 RX ADMIN — LEVOTHYROXINE SODIUM 100 MCG: 100 TABLET ORAL at 06:30

## 2018-04-24 RX ADMIN — OXYCODONE HYDROCHLORIDE 5 MG: 5 TABLET ORAL at 14:41

## 2018-04-24 RX ADMIN — OXYCODONE HYDROCHLORIDE 5 MG: 5 TABLET ORAL at 09:15

## 2018-04-24 RX ADMIN — HYDROCHLOROTHIAZIDE 12.5 MG: 25 TABLET ORAL at 09:15

## 2018-04-24 RX ADMIN — FAMOTIDINE 20 MG: 20 TABLET, FILM COATED ORAL at 09:14

## 2018-04-24 RX ADMIN — THERA TABS 1 TABLET: TAB at 09:15

## 2018-04-24 RX ADMIN — TRAMADOL HYDROCHLORIDE 50 MG: 50 TABLET, FILM COATED ORAL at 06:29

## 2018-04-24 RX ADMIN — ACETAMINOPHEN 650 MG: 325 TABLET, FILM COATED ORAL at 09:15

## 2018-04-24 RX ADMIN — ACETAMINOPHEN 650 MG: 325 TABLET, FILM COATED ORAL at 04:00

## 2018-04-24 RX ADMIN — POLYETHYLENE GLYCOL 3350 17 G: 17 POWDER, FOR SOLUTION ORAL at 09:14

## 2018-04-24 RX ADMIN — ACETAMINOPHEN 650 MG: 325 TABLET, FILM COATED ORAL at 15:22

## 2018-04-24 RX ADMIN — CEFAZOLIN SODIUM 2 G: 2 SOLUTION INTRAVENOUS at 02:21

## 2018-04-24 RX ADMIN — OXYCODONE HYDROCHLORIDE 5 MG: 5 TABLET ORAL at 05:02

## 2018-04-24 NOTE — PROGRESS NOTES
Bedside shift change report given to Concepcion (oncoming nurse) by Eloy Yepez (offgoing nurse). Report included the following information SBAR and Kardex.

## 2018-04-24 NOTE — DISCHARGE INSTRUCTIONS
After Hospital Care Plan:  Discharge Instructions Knee Replacement  Dr. Jack Espinal    Patient Name: Aquiles Reddy  Date of procedure: 4/23/2018   Procedure: Procedure(s):  LEFT TOTAL KNEE ARTHROPLASTY   Surgeon: Edda Cline) and Role:     * Beulah Merritt MD - Primary  PCP: Jadon Celestin MD  Date of discharge: No discharge date for patient encounter. Follow up appointments  -follow up with Dr. Jack Espinal in 3 weeks. Call [phone], ext [phone]) to make an appointment. [facility]: ________________________ phone: _____________________  The agency will contact you to arrange dates/times for visits. Please call them if you do not hear from them within 24 hours after you are discharged    When to call your Orthopaedic Surgeon:  -unrelieved pain  -Signs of infection-if your incision is red; continues to have drainage; drainage has a foul odor or if you have a persistent fever over 101 degrees  -Signs of a blood clot in your leg-calf pain, tenderness, redness, swelling of lower leg    When to call your Primary Care Physician:  -Concerns about medical conditions such as diabetes, high blood pressure, asthma, congestive heart failure  -Call if blood sugars are elevated, persistent headache or dizziness, coughing or congestion, constipation or diarrhea, burning with urination, abnormal heart rate    When to call 911and go to the nearest emergency room  -acute onset of chest pain, shortness of breath, difficulty breathing    Activity  -walk with your walker or crutches putting as much weight on your leg as you can tolerate.   Refer to pages 23-31 of your handbook for instructions and pictures  -do 20 repetitions of each exercise 3 times each day as instructed by the physical therapist.  Refer to pages 32-40 of your handbook for exercise instructions and pictures  -get up every one hour and walk (except at night when sleeping)  -do not drive or operate heavy machinery    Incision Care  -the Aquacel (Elliot Valdez waterproof) surgical dressing is to remain on your knee for 7 days. On the 7th day have someone gently peel the dressing off by carefully lifting the edge and stretching it slightly to break the adhesive seal and leave incision open to air. You may take a shower with the Aquacel dressing in place. Preventing blood clots   -Take Aspirin 325 mg twice daily as prescribed  by Dr. Sergio Webb for one month following surgery      Pain management  - Please take scheduled 650 mg tylenol every 6 hours for 4 weeks  - Please take scheduled diclofenac 50 mg twice daily for 4 weeks  - Please take tramadol every 6 hours for pain as needed for pain. - For breakthrough please take 5-10 mg oxycodone     - While taking aspirin and diclofenac, please take famotidine (PEPCID) as prescribed 20 mg twice daily to prevent stomach ulcers/irritation.   - If you have a history of stomach ulcers, do not take diclofenac. - Avoid alcoholic beverages while taking pain medication  - Do not take any over-the-counter medication for pain except Tylenol (acetaminophen)  - Please be aware that many medications contain Tylenol. We do not want you to over medicate so please read the information below as a guide. Do not take more than 4 Grams of Tylenol in a 24 hour  - You may place an ice bag on your knee for 15-20 minutes after exercising and as needed throughout the day and night      Diet  -resume usual diet; drink plenty of fluids; eat foods high in fiber  -you may want to take a stool softener (such as Senokot-S or Colace) to prevent constipation while you are taking pain medication.   If constipation occurs, take a laxative (such as Dulcolax tablets, Milk of Magnesia, or a suppository)    [facility] Protocol (to be followed by [address])    Nursing-per physicians order  -remove Aquacel dressing at 7 days post-op   -complete head to toe assessment, vital signs  -medication reconciliation  -review pain management  -manage chronic medical conditions    Physical Therapy-per physicians order    Weight bearing status:  Precautions at Admission: Fall, WBAT  Left Side Weight Bearing: As tolerated  Right Side Weight Bearing: Full    Mobility Status:  Supine to Sit: Modified independent  Sit to Stand: Modified independent, Additional time, Adaptive equipment  Sit to Supine: Modified independent       Gait:  Distance (ft): 200 Feet (ft)  Ambulation - Level of Assistance: Modified independent, Adaptive equipment, Additional time  Assistive Device: Gait belt, Walker, rolling  Gait Abnormalities: Antalgic, Decreased step clearance, Step to gait (progressed to step through)    ADL status overall composite:  Dressing Assistance: Independent        Toilet Transfer : Contact guard assistance       Physical Therapy  -assessment and evaluation-bed mobility; functional transfers (bed, chair, bathroom, stairs); ambulation with equipment, car transfers, safety and ability to get out of house in the event of an emergency  -review and maintain weight bearing as tolerated  -discuss pain management  -review how to do ADLs    -Home Exercise Program-refer to pages 32-40 of the patient handbook for exercises  -supine exercises-ankle pumps, hamstring sets, quad sets, heel slides, short arc quad sets, long arc quads-prop heel on pillow for stretch, straight leg raise  -sitting exercises-active knee flexion, passive knee flexion, active knee extension, ankle pumps, bicep curls (use weights as appropriate), triceps pushups, shoulder flexion (use weights as appropriate)  -standing exercises holding onto supportive counter-toe raises, heel raises, mini-squats, heel/toe touches with knee bend, marching in place, hamstring curls    Physical therapy goals by discharge from [facility] [address] ambulation with walker, crutches or cane if needed (level surfaces and   stairs)  -Flexion > 90 degrees, extension 0 degrees  -Daily performance of home exercise program  -Independent with stair climbing and car transfers  -Progress to community ambulator (least restrictive assistive device)

## 2018-04-24 NOTE — PROGRESS NOTES
Occupational Therapy EVALUATION/discharge  Patient: Jonathon Hein (82 y.o. male)  Date: 4/24/2018  Primary Diagnosis: TRICOMPARTMENTAL OSTEOARTHRITIS BILATERAL KNEE   Osteoarthritis of left knee  Procedure(s) (LRB):  LEFT TOTAL KNEE ARTHROPLASTY  (Left) 1 Day Post-Op   Precautions:  Fall, WBAT    ASSESSMENT:   Cleared by RN to see pt for therapy session. Based on the objective data described below, the patient presents with decreased strength and ROM of LLE and mildly decreased balance following admission for L TKA. Pt is previously independent with ADLs, IADLs and functional mobility, works as a . Pt received seated EOB, pleasant and agreeable to participate. Educated pt on importance of not twisting L knee during ADLs and he verbalized understanding. Pt able to perform LB dressing ADLs with supervision without AE, reviewed use of reacher and shoe horn to assist with dressing as he has this equipment at home. Pt performed functional transfers with CGA-supervision using RW, with verbal cues from therapist for proper step sequence and safe use of RW during turns and standing activity. Returned to sitting EOB at end of session. Discussed shower transfers, use of bathroom DME, proper sitting surfaces at home, and use of leg  to assist with bed mobility. Pt had no further questions or concerns for OT at end of session. At this time he is performing ADLs and a supervision-independent level, and wife will be available 24/7 to assist as needed. Pt is cleared by OT to return home when medically ready. Recommend HHOT at discharge. Further skilled acute occupational therapy is not indicated at this time. Discharge Recommendations: Home Health  Further Equipment Recommendations for Discharge: pt has necessary equipment      SUBJECTIVE:   Patient stated I have one of those shoe horns at home.     OBJECTIVE DATA SUMMARY:   HISTORY:   Past Medical History:   Diagnosis Date    Arthritis     Cancer ([facility] [address]     MELANOMA RIGHT EAR    Chronic pain     KNEES, SHOULDERS    GERD (gastroesophageal reflux disease)     Hypertension     Thyroid disease      Past Surgical History:   Procedure Laterality Date    HX COLONOSCOPY  2014    HX HEENT  12/2006    THYROIDECTOMY    HX OTHER SURGICAL Right 05/2010    EXC MELANOMA EAR    HX TONSILLECTOMY  CHILD       Prior Level of Function/Environment/Context: Pt is previously independent with ADLs, IADLs and functional mobility, works as a . Home Situation  Home Environment: Private residence  # Steps to Enter: 2  Rails to Enter: Yes  Hand Rails : Right  One/Two Story Residence: One story  Living Alone: No (lives with wife)  Support Systems: Child(katherin), Spouse/Significant Other/Partner  Patient Expects to be Discharged to[de-identified] Private residence  Current DME Used/Available at Home: [address] [facility] [address], straight, Crutches, Raised toilet seat, Shower chair, Walker, rolling, Grab bars  Tub or Shower Type: Shower  [x]  Right hand dominant   []  Left hand dominant    EXAMINATION OF PERFORMANCE DEFICITS:  Cognitive/Behavioral Status:  Neurologic State: Alert  Orientation Level: Oriented X4  Cognition: Follows commands  Perception: Appears intact  Perseveration: No perseveration noted  Safety/Judgement: Awareness of environment; Fall prevention;Home safety    Hearing: Auditory  Auditory Impairment: None    Vision/Perceptual:                 Acuity: Able to read clock/calendar on wall without difficulty    Corrective Lenses: Glasses    Range of Motion:  AROM: Generally decreased, functional (L shoulder flex/abd 90 degrees due to arthritis)  PROM: Within functional limits          Strength:  Strength: Within functional limits (apart from LLE)          Coordination:  Coordination: Within functional limits  Fine Motor Skills-Upper: Left Intact; Right Intact    Gross Motor Skills-Upper: Left Impaired;Right Intact    Tone & Sensation:  Tone: Normal  Sensation: Impaired (mild numbness bilat hands)             Balance:  Sitting: Intact  Standing: Intact; With support (RW)    Functional Mobility and Transfers for ADLs:  Bed Mobility:   Received sitting EOB. Discussed use of leg  to assist with bed mobility. Transfers:  Sit to Stand: Contact guard assistance  Stand to Sit: Contact guard assistance  Toilet Transfer : Contact guard assistance    ADL Assessment:  Feeding: Independent    Oral Facial Hygiene/Grooming: Independent    Bathing: Supervision; Adaptive equipment    Upper Body Dressing: Independent    Lower Body Dressing: Supervision    Toileting: Supervision        ADL Intervention and task modifications:    Educated pt on importance of not twisting L knee during ADLs and he verbalized understanding. Pt able to perform LB dressing ADLs with supervision without AE, reviewed use of reacher and shoe horn to assist with dressing as he has this equipment at home. Provided verbal and tactile cues for proper step sequence and safe use of RW during turns and standing activity. Discussed shower transfers, use of bathroom DME, proper sitting surfaces at home, and use of leg  to assist with bed mobility. Grooming  Washing Hands: Supervision/set-up (standing)              Upper Body Dressing Assistance  Dressing Assistance: Independent    Lower Body Dressing Assistance  Underpants: Supervision/set-up  Pants With Elastic Waist: Supervision/set-up         Cognitive Retraining  Safety/Judgement: Awareness of environment; Fall prevention;Home safety      Bathing: Patient instructed and indicated understanding when bathing to not submerge wound in water, stand to shower or sponge bathe, cover wound with plastic and tape to ensure no water reaches bandage/wound without cues. Dressing joint: Patient instructed and demonstrated understanding to don/doff LLE first/last with Supervision.   Patient instructed and demonstrated to don all clothing while sitting prior to standing, doff all clothing to knees while standing, then sit to doff clothing off from knees to feet in order to facilitate fall prevention, pain management, and energy conservation with Supervision. Dressing joint reach exercise: To increase independence with lower body dressing, patient instructed and demonstrated to reach down LLE in a seated position slowly to prevent tearing/shearing until slight pull is felt, hold at end range for 10 seconds, then return to starting upright position with Supervision. Patient instructed to complete three sets of three repetitions each daily. Home safety: Patient instructed and indicated understanding on home modifications and safety (raise height of ADL objects, appropriate height of chair surfaces, recliner safety, change of floor surfaces, clear pathways) to increase independence and fall prevention. Standing: Patient instructed and demonstrated during ADLs to walk up to sink/counter top/surfaces, step into walker to increase safety of joint and fall prevention with Supervision. Patient educated about knee anatomy verbally and with pictures and educated to avoid rotation of L LE. Instructed to apply concept to ADLs within the home (no twisting of knee during reaching across body, square off while using objects, slide objects along surfaces). Patient instructed and indicated understanding to increase amount of time standing, observe standing position during ADLs in order to increase even weight bearing through bilateral LEs in order to increase independence with ADLs. Goal to be reached 30 days post - op, per orthopedic surgeon or per PT. Tub/shower transfer: Patient instructed and indicated understanding regarding when it is safe to begin transfer into tub/shower (complete stairs with PT, advance exercises with PT high enough to clear tub/shower height).   Patient instructed to use the same technique as used with stairs when entering and exiting tub/shower (\"up with the non-surgical, down with the surgical leg\"). Functional Measure:  Barthel Index:    Bathin  Bladder: 10  Bowels: 10  Groomin  Dressin  Feeding: 10  Mobility: 10  Stairs: 5  Toilet Use: 10  Transfer (Bed to Chair and Back): 10  Total: 75       Barthel and G-code impairment scale:  Percentage of impairment CH  0% CI  1-19% CJ  20-39% CK  40-59% CL  60-79% CM  80-99% CN  100%   Barthel Score 0-100 100 99-80 79-60 59-40 20-39 1-19   0   Barthel Score 0-20 20 17-19 13-16 9-12 5-8 1-4 0      The Barthel ADL Index: Guidelines  1. The index should be used as a record of what a patient does, not as a record of what a patient could do. 2. The main aim is to establish degree of independence from any help, physical or verbal, however minor and for whatever reason. 3. The need for supervision renders the patient not independent. 4. A patient's performance should be established using the best available evidence. Asking the patient, friends/relatives and nurses are the usual sources, but direct observation and common sense are also important. However direct testing is not needed. 5. Usually the patient's performance over the preceding 24-48 hours is important, but occasionally longer periods will be relevant. 6. Middle categories imply that the patient supplies over 50 per cent of the effort. 7. Use of aids to be independent is allowed. Candace Ray., Barthel, D.W. (1828). Functional evaluation: the Barthel Index. [facility] (14)2. Justino Pennington luis e NATACHA Velazquez, Basilio Mancia., Tip Nath., [place], [address] [place] [address] (). Measuring the change indisability after inpatient rehabilitation; comparison of the responsiveness of the Barthel Index and Functional [place] Measure. Journal of Neurology, Neurosurgery, and Psychiatry, 66(4), 358-653. MANSOOR Rogers.A, SANTIAGO Quarles, & Hernan Moulton MPandaA. (2004.) Assessment of post-stroke quality of life in cost-effectiveness studies:  The usefulness of the Barthel Index and the EuroQoL-5D. Quality of Life Research, 13, 829-27       G codes: In compliance with CMSs Claims Based Outcome Reporting, the following G-code set was chosen for this patient based on their primary functional limitation being treated: The outcome measure chosen to determine the severity of the functional limitation was the Barthel Index with a score of 75/100 which was correlated with the impairment scale. ? Self Care:     - CURRENT STATUS: CJ - 20%-39% impaired, limited or restricted    - GOAL STATUS: CJ - 20%-39% impaired, limited or restricted    - D/C STATUS:  CJ - 20%-39% impaired, limited or restricted     Occupational Therapy Evaluation Charge Determination   History Examination Decision-Making   LOW Complexity : Brief history review  LOW Complexity : 1-3 performance deficits relating to physical, cognitive , or psychosocial skils that result in activity limitations and / or participation restrictions  LOW Complexity : No comorbidities that affect functional and no verbal or physical assistance needed to complete eval tasks       Based on the above components, the patient evaluation is determined to be of the following complexity level: LOW   Pain:  Pain Scale 1: Numeric (0 - 10)  Pain Intensity 1: 4  Pain Location 1: Incisional;Knee  Pain Orientation 1: Inner;Left  Pain Description 1: Aching;Burning; Intermittent; Sore  Pain Intervention(s) 1: Medication (see MAR); Ice  Activity Tolerance:   Good  Please refer to the flowsheet for vital signs taken during this treatment. After treatment:   []  Patient left in no apparent distress sitting up in chair  [x]  Patient left in no apparent distress in bed  [x]  Call bell left within reach  [x]  Nursing notified  []  Caregiver present  []  Bed alarm activated    COMMUNICATION/EDUCATION:   Communication/Collaboration:  [x]      Home safety education was provided and the patient/caregiver indicated understanding.   [x]      Patient/family have participated as able and agree with findings and recommendations. []      Patient is unable to participate in plan of care at this time.   Findings and recommendations were discussed with: Physical Therapist and Registered Nurse    Yosvany Mcknight OT  Time Calculation: 39 mins

## 2018-04-24 NOTE — PROGRESS NOTES
Care Management Interventions  PCP Verified by CM: Yes  Palliative Care Criteria Met (RRAT>21 & CHF Dx)?: No  Mode of Transport at Discharge: Other (see comment) (family)  Transition of Care Consult (CM Consult): Home Health, Discharge [address]\""way: Yes  Reason [facility]: Patient already serviced by other home care/hospice agency (Patient prefers [facility] as family member works for them)  MyChart Signup: No  Discharge Durable Medical Equipment: No (patient owns cane and walker)  Health Maintenance Reviewed: Yes  Physical Therapy Consult: Yes  Occupational Therapy Consult: Yes  Speech Therapy Consult: No  Current Support Network: Own Home, Lives with Spouse  Confirm Follow Up Transport: Family  Plan discussed with Pt/Family/Caregiver: Yes  Freedom of Choice Offered: Yes  Discharge Location  Discharge Placement: Home with home health  Reason for Admission:   LEFT TOTAL KNEE ARTHROPLASTY                     RRAT Score: 5                    Plan for utilizing home health:     Patient prefers [facility]. Referral sent via [employer]. They do not accept patient's insurance. Patient agreeable to referral being sent to [facility]. Likelihood of Readmission:  low                         Transition of Care Plan:   Home with home health. 2:38 PM: Received call from Flo liaison with [facility]. They cannot accept pt as they are on delay for start of care. CM sent referral to [facility]. [facility] no longer accepts [employer]. Referrals were sent to [facility] and [facility] via [employer]. [facility] does not accept [employer]. CM called [facility] to follow-up on referral, awaiting response. 3:46 PM: Spoke with Silver Napoles with [facility], they can accept this patient.     Yulisa Vann, BSW/CRM

## 2018-04-24 NOTE — PROGRESS NOTES
Problem: Mobility Impaired (Adult and Pediatric)  Goal: *Acute Goals and Plan of Care (Insert Text)  Physical Therapy Goals  Initiated 4/23/2018    1. Patient will move from supine to sit and sit to supine  and scoot up and down in bed with modified independence within 4 days. 2. Patient will perform sit to stand with modified independence within 4 days. 3. Patient will ambulate with contact guard assist for 150 feet with the least restrictive device within 4 days. 4. Patient will ascend/descend 2 stairs with 1 handrail(s) with minimal assistance/contact guard assist within 4 days. 5. Patient will perform home exercise program per protocol with supervision/set-up within 4 days. 6. Patient will demonstrate AROM 0-90 degrees in operative joint within 4 days. physical Therapy twice daily TREATMENT  Patient: Adriel Patel (49 y.o. male)  Date: 4/24/2018  Diagnosis: TRICOMPARTMENTAL OSTEOARTHRITIS BILATERAL KNEE   Osteoarthritis of left knee <principal problem not specified>  Procedure(s) (LRB):  LEFT TOTAL KNEE ARTHROPLASTY  (Left) 1 Day Post-Op  Precautions: Fall, WBAT  Chart, physical therapy assessment, plan of care and goals were reviewed. ASSESSMENT:  Patient seen twice today for progression of gait and exercise training. He was able to ambulate with the RW with a stable gait, though maintains a step to gait pattern primarily, due to pain. He was trained in stair management and able to demonstrate safe asc/desc of 4 steps using a railing and cane. Reviewed all exercises and he was able to perform then well, needing some assistance for SLR only. He has a strong quad set and good knee flexion overall. His VS have remained stable with BP in the 140s/70s and SpO2 96% on room air with activity. Plan is discharge home today with HHPT and family assistance. His daughter is a PTA and will also be available to assist as needed.   Patient is clear for D/C home from a PT standpoint and RN is aware.  Progression toward goals:  [x]      Improving appropriately and progressing toward goals  []      Improving slowly and progressing toward goals  []      Not making progress toward goals and plan of care will be adjusted     PLAN:  Patient continues to benefit from skilled intervention to address the above impairments. Continue treatment per established plan of care. Discharge Recommendations:  Home Health  Further Equipment Recommendations for Discharge:  None     SUBJECTIVE:   Patient stated I feel better today, but the soreness is starting some.     OBJECTIVE DATA SUMMARY:   Critical Behavior:  Neurologic State: Alert  Orientation Level: Oriented X4  Cognition: Follows commands  Safety/Judgement: Awareness of environment, Fall prevention, Home safety  Range of Motion:  AROM:  (L knee 0-90 with stretching)  PROM: Within functional limits                    Functional Mobility Training:  Bed Mobility:  Rolling: Modified independent  Supine to Sit: Modified independent  Sit to Supine: Modified independent           Transfers:  Sit to Stand: Modified independent; Additional time; Adaptive equipment  Stand to Sit: Modified independent; Adaptive equipment; Additional time                             Balance:  Sitting: Intact; Without support  Standing: Intact; With support  Standing - Static: Good  Standing - Dynamic : Good  Ambulation/Gait Training:  Distance (ft): 200 Feet (ft)  Assistive Device: Gait belt;Walker, rolling  Ambulation - Level of Assistance: Modified independent; Adaptive equipment; Additional time        Gait Abnormalities: Antalgic;Decreased step clearance; Step to gait (progressed to step through)  Right Side Weight Bearing: Full  Left Side Weight Bearing: As tolerated  Base of Support: Narrowed  Stance: Left decreased  Speed/Daya: Pace decreased (<100 feet/min)  Step Length: Left shortened;Right shortened  Swing Pattern: Left asymmetrical                 Stairs:  Number of Stairs Trained: 4  Stairs - Level of Assistance: Supervision; Adaptive equipment; Additional time  Rail Use: Right   Therapeutic Exercises:     EXERCISE   Sets   Reps   Active Active Assist   Passive Self ROM   Comments   Ankle Pumps   [x]                                        []                                        []                                        []                                           Quad Sets   [x]                                        []                                        []                                        []                                           Hamstring Sets   []                                        []                                        []                                        []                                           Short Arc Quads   [x]                                        []                                        []                                        []                                           Knee Extension Stretch     []                                          [x]                                          []                                          []                                           Heel Slides   [x]                                        []                                        []                                        []                                           Long Arc Quads   []                                        []                                        []                                        []                                           Knee Flexion Stretch   []                                        [x]                                        []                                        []                                           Straight Leg Raises   []                                        [x]                                        []                                        []                                             Pain:  Pain Scale 1: Numeric (0 - 10)  Pain Intensity 1: 4  Pain Location 1: Incisional;Knee  Pain Orientation 1: Left  Pain Description 1: Burning;Aching  Pain Intervention(s) 1: Medication (see MAR); Ice  Activity Tolerance:   Good, some very mild increased respiratory rate with activity but all VSS and patient denies any complaints  Please refer to the flowsheet for vital signs taken during this treatment.   After treatment:   [] Patient left in no apparent distress sitting up in chair  [x] Patient left in no apparent distress in bed  [x] Call bell left within reach  [x] Nursing notified  [] Caregiver present  [] Bed alarm activated    COMMUNICATION/COLLABORATION:   The patients plan of care was discussed with: Occupational Therapist, Registered Nurse and     Vane Escalona, PT   Time Calculation: 20 mins

## 2018-04-24 NOTE — PROGRESS NOTES
Patient continues to sit in his bedside chair for prolonged periods of time. I have spoke with him about his swelling in his legs and how sitting in the chair too long will not help the swelling. He states \"I can't stay in the bed\". I explained sitting for 45 minutes is long enough and when back in bed his legs should be elevated to help with the swelling.

## 2018-04-24 NOTE — PROGRESS NOTES
No issues overnight      GEN:  NAD.  AOx3   ABD:  S/NT/ND   LLE:  Dressing C/D/I , 5/5 motor, Calf nttp (Bilat), Sensation rossly intact to light touch throughout, 1+ dp/pt pulses, foot perfused    Patient Vitals for the past 24 hrs:   Temp Pulse Resp BP SpO2   04/24/18 0330 97.5 °F (36.4 °C) 71 16 129/66 -   04/24/18 0044 97.7 °F (36.5 °C) 77 18 137/78 97 %   04/23/18 2139 98.1 °F (36.7 °C) 86 16 136/71 94 %   04/23/18 1800 98.1 °F (36.7 °C) 90 16 130/82 97 %   04/23/18 1700 98.1 °F (36.7 °C) 90 18 125/80 95 %   04/23/18 1600 98 °F (36.7 °C) 74 18 147/83 95 %   04/23/18 1546 - 85 - (!) 178/101 -   04/23/18 1541 - 69 - (!) 151/92 -   04/23/18 1535 - 73 - 152/87 94 %   04/23/18 1447 97.8 °F (36.6 °C) 74 14 144/83 96 %   04/23/18 1400 - 72 12 148/77 97 %   04/23/18 1345 - 77 16 138/81 98 %   04/23/18 1330 - 71 14 146/80 96 %   04/23/18 1315 - 73 16 134/78 95 %   04/23/18 1305 - 74 17 130/76 95 %   04/23/18 1300 - 79 17 125/73 96 %   04/23/18 1256 98.2 °F (36.8 °C) - - - -   04/23/18 1255 - 83 14 117/76 95 %   04/23/18 1253 98.2 °F (36.8 °C) 82 16 129/70 95 %   04/23/18 1250 - - - 129/70 -   04/23/18 0818 98.3 °F (36.8 °C) 82 16 157/88 96 %       Current Facility-Administered Medications   Medication Dose Route Frequency    levothyroxine (SYNTHROID) tablet 100 mcg  100 mcg Oral ACB    therapeutic multivitamin (THERAGRAN) tablet 1 Tab  1 Tab Oral DAILY    pravastatin (PRAVACHOL) tablet 20 mg  20 mg Oral QHS    0.9% sodium chloride infusion  125 mL/hr IntraVENous CONTINUOUS    sodium chloride (NS) flush 5-10 mL  5-10 mL IntraVENous Q8H    sodium chloride (NS) flush 5-10 mL  5-10 mL IntraVENous PRN    acetaminophen (TYLENOL) tablet 650 mg  650 mg Oral Q6H    oxyCODONE IR (ROXICODONE) tablet 5 mg  5 mg Oral Q3H PRN    naloxone (NARCAN) injection 0.4 mg  0.4 mg IntraVENous PRN    ondansetron (ZOFRAN) injection 4 mg  4 mg IntraVENous Q4H PRN    prochlorperazine (COMPAZINE) with saline injection 5 mg  5 mg IntraVENous Q6H PRN    hydrOXYzine HCl (ATARAX) tablet 10 mg  10 mg Oral Q8H PRN    famotidine (PEPCID) tablet 20 mg  20 mg Oral BID    senna-docusate (PERICOLACE) 8.6-50 mg per tablet 1 Tab  1 Tab Oral BID    polyethylene glycol (MIRALAX) packet 17 g  17 g Oral DAILY    [START ON 4/25/2018] bisacodyl (DULCOLAX) suppository 10 mg  10 mg Rectal DAILY PRN    aspirin delayed-release tablet 325 mg  325 mg Oral BID    traMADol (ULTRAM) tablet 50 mg  50 mg Oral Q6H    losartan (COZAAR) tablet 50 mg  50 mg Oral DAILY    And    hydroCHLOROthiazide (HYDRODIURIL) tablet 12.5 mg  12.5 mg Oral DAILY    diclofenac EC (VOLTAREN) tablet 50 mg  50 mg Oral BID       Lab Results   Component Value Date/Time    HGB 11.9 (L) 04/24/2018 05:00 AM    INR 1.0 04/16/2018 08:58 AM       Lab Results   Component Value Date/Time    Sodium 138 04/24/2018 05:00 AM    Potassium 4.1 04/24/2018 05:00 AM    Chloride 108 04/24/2018 05:00 AM    CO2 22 04/24/2018 05:00 AM    BUN 13 04/24/2018 05:00 AM    Creatinine 0.99 04/24/2018 05:00 AM    Calcium 7.8 (L) 04/24/2018 05:00 AM            60 y.o. male s/p left total knee arthroplasty on 4/23/2018  . Doing well.        ABX: Complete 24 hours perioperative abx  PATHWAY: Straight cath per protocol if needed  DVT Prophylaxis: Aspirin 325 mg enteric coated  Weight Bearing: WBAT LLE   Pain Control: Diclofenac,  Tylenol, scheduled tramadol, oxy 5 mg for breakthrough, dilaudid IV 0.5 mg for secondary breakthrough  Disposition: Home, HHPT     Dc likely today

## 2018-04-24 NOTE — PROGRESS NOTES
I have reviewed discharge instructions with the patient. The patient verbalized understanding. Reviewed prescriptions, signs and symptoms to report and gave opportunity for questions. Patient left via wheelchair with volunteer assistance and driven home by wife.

## 2018-04-25 NOTE — OP NOTES
Name: Soco Stewart  MRN:  [id number]  : 1955  Age:  60 y.o. Surgery Date: 2018      OPERATIVE REPORT - LEFT TOTAL KNEE REPLACEMENT    PREOPERATIVE DIAGNOSIS: Osteoarthritis, left knee. POSTOPERATIVE DIAGNOSIS: Osteoarthritis, left knee. PROCEDURE PERFORMED: Left total knee arthroplasty. SURGEON: Diana Quesada MD    FIRST ASSISTANT:  [facility] staff    ANESTHESIA: Spinal    PRE-OP ANTIBIOTIC: Ancef 2g    COMPLICATIONS: None. ESTIMATED BLOOD LOSS: 100 mL. SPECIMENS REMOVED: None. COMPONENTS IMPLANTED:   Implant Name Type Inv. Item Serial No.  Lot No. LRB No. Used Action   CEMENT BNE GENTAMC GHV 40GM -- SMARTSET - SNA  CEMENT BNE GENTAMC GHV 40GM -- SMARTSET NA [facility] [id number] Left 2 Implanted   PAT ASYM TRIATHLN X3 [id number] -- TRIATHLON ASYMMETRIC X3 - SNA  PAT ASYM TRIATHLN X3 [id number] -- TRIATHLON ASYMMETRIC X3 NA [employer] HOW [id number] Left 1 Implanted   Triathlon Universal Tibial Baseplate, size 6 Joint Component  NA [employer] [id number] Left 1 Implanted   FEM KNE RUBEN CR SZ 7 LT -- TRIATHLON - SNA  FEM KNE RUBEN CR SZ 7 LT -- TRIATHLON NA [employer] HOW [id number] Left 1 Implanted   INSERT TIB CR TRIATHLN X3 6 9M --  - SNA  INSERT TIB CR TRIATHLN X3 6 9M --  NA [employer] HOW [id number] Left 1 Implanted   KNEE RUBEN TRIATHLON X3 -- K5 BSV - [id number]   KNEE RUBEN TRIATHLON X3 -- K5 BSV   [employer] HOW   Left 1 Implanted       INDICATIONS: The patient is an 60 yrs male with progressive debilitating left knee pain due to severe osteoarthritis. Symptoms have progressed despite comprehensive conservative treatment and they presents for left total knee replacement. Risks, benefits, alternatives of the procedure were reviewed in detail and the patient desired to proceed. Risks including bleeding, infection, damage to surrounding structures, blood clots, pulmonary embolism, and death were discussed.  The patient understood understands Subjective:   Jamar Marcano is a 43 y.o. male who presents today as a new patient. He was sent by Dr. Ruiz in regards to his atypical chest pain    He works full time and is busy day on. He gets stressed at work and has a busy family as well. 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 He does not get chest pain at work, but has bothered him for several years    He saw my partner for this reason in 2012. He describes the pain is tactile over the left ribs, it happens when he rests or lay down. He admits to being overweight and is concerned about his blood glucose that his doctor is following. The pain lasts for minutes, it hasn't seemed to change but he really sometimes worries it's related to a heart or lung condition.    No changes, he does keep up at night with this. He does snore and he is getting workup for sleep apnea  No other associated symptoms such as breathlessness nervousness fainting or sweating    History reviewed. No pertinent past medical history.  Past Surgical History   Procedure Laterality Date   • Foot surgery       Family History   Problem Relation Age of Onset   • Heart Disease Neg Hx      History   Smoking status   • Never Smoker    Smokeless tobacco   • Never Used     No Known Allergies  Outpatient Encounter Prescriptions as of 4/21/2017   Medication Sig Dispense Refill   • Pseudoephedrine HCl (SUDAFED 12 HOUR PO) Take  by mouth.     • ibuprofen (MOTRIN) 200 MG TABS Take 200 mg by mouth every 6 hours as needed.     • Dtrkoxtsm-AVE-MU-APAP (WESTON-SELTZER PLUS COLD & FLU PO) Take  by mouth.     • azithromycin (ZITHROMAX) 250 MG TABS 2 tabs by mouth day 1, 1 tab by mouth days 2-5 6 Tab 0     No facility-administered encounter medications on file as of 4/21/2017.     Review of Systems   Constitutional: Positive for malaise/fatigue. Negative for weight loss.   HENT: Negative for hearing loss.    Eyes: Negative.    Respiratory: Negative for cough and shortness of breath.   "  Cardiovascular: Negative for palpitations, claudication, leg swelling and PND.   Gastrointestinal: Negative for heartburn and nausea.   Musculoskeletal: Negative for back pain, falls and neck pain.   Neurological: Negative for dizziness, loss of consciousness and headaches.   Endo/Heme/Allergies: Does not bruise/bleed easily.   Psychiatric/Behavioral: Negative for depression and memory loss. The patient has insomnia. The patient is not nervous/anxious.    All other systems reviewed and are negative.       Objective:   /76 mmHg  Pulse 66  Ht 1.829 m (6' 0.01\")  Wt 122.925 kg (271 lb)  BMI 36.75 kg/m2  SpO2 94%    Physical Exam   Constitutional: He is oriented to person, place, and time. He appears well-nourished.   Anxious but appropriate   Eyes: EOM are normal. Pupils are equal, round, and reactive to light. No scleral icterus.   Neck: No JVD present. No tracheal deviation present. No thyromegaly present.   Cardiovascular: Normal rate and regular rhythm.    Pulmonary/Chest: Breath sounds normal. No respiratory distress. He has no wheezes. He exhibits no tenderness.   Abdominal: Bowel sounds are normal.   Musculoskeletal: He exhibits no edema.   Neurological: He is alert and oriented to person, place, and time. He exhibits normal muscle tone.   Skin: Skin is warm and dry. No rash noted.   Psychiatric: He has a normal mood and affect. His behavior is normal.       Assessment:     1. Intercostal pain  Echocardiogram Comp w/o Cont    DX-CHEST-2 VIEWS   2. Abnormal EKG     3. Overweight     4. Snoring     5. Elevated random blood glucose level         Medical Decision Making:  Today's Assessment / Status / Plan:     We reviewed his hospital course which goes back to several years. I looked at his EKG from his doctor's office was only says mild ST abnormalities in sinus rhythm    We talked about the differential diagnosis for chest pain including but not limited to costochondritis and anxiety, stressors " the increased risk for perioperative medical complications due to his medical comorbidities. DESCRIPTION OF PROCEDURE:DESCRIPTION OF PROCEDURE: Epidural/spinal anesthesia was initiated. Two grams of cefazolin were administered within 30 minutes of incision. The left lower extremity was prepped and draped in the usual sterile fashion. The skin was covered with Ioban occlusive dressing. A tourniquet was only employed for cementation- total time- 16 minutes. A midline skin incision was made with a 10 blade and small flaps were elevated. A medial parapatellar incision was made to the knee. The knee was exposed and the distal femur was cut at 5 degrees distal femoral valgus. The tibia was subluxed and a neutral varus/valgus proximal tibial cut was made matching the patient's slope. The meniscal remnants were removed. The posterior osteophytes were removed from the femur. The extension gap was determined using spacer blocks and appropriate releases were made. Femur was sized per above. An AP cutting block was placed, rotated using a gap balancing techniqe. Anterior, posterior, and chamfer cuts were carried out. The tibial was then sized and correct rotation was identified using the medial 1/3 of the tibial tubercle as a landmark. The tibia was prepared using a drill followed by a keel punch. A reciprocating saw was used to begin the cuts for the keel punch to avoid tibial fracture. Trials were placed. The patella was sized using a caliper and an appropriate resection  was performed. Lug holes were drilled and a trial was fitted. The knee tracked well with all trial implants. The trials were then removed. Bony surfaces were drilled, lavaged, and dried. All components were cemented. Excess cement was removed. Polyethylene component was placed. After the cement had fully cured, the knee was ranged and  irrigated copiously with pulsatile lavage.      All surrounding soft tissues were infiltrated with local anesthetic. The arthrotomy  was closed with running quill suture and 0 vicryl suture. Skin and subcutaneous tissues were irrigated and closed in standard fashion with 2-0 vicryl and 3-0 monocryl. An aquacel dressing was placed. The patient underwent straight catheterization at the end of the case. There were no complications. The procedure was a LEFT TOTAL KNEE REPLACEMENT. A [place] total knee construct was utilized. No specimens were obtained/sent. The patient was transferred to the recovery room in stable condition.       Micheal Pickens MD underlying coronary disease or heart disease as well as pulmonary diseases. He has no high risk features of any of these in my opinion. He does not have tactile tenderness on his exam    I think it'll be reasonable to start with reassurance and mild noninvasive testing  Will order a chest x-ray as well as an echocardiogram to assess the heart and lung function and rule out high risk lesions or dysfunction    Of course if he has more symptoms or change, I would ask him to come back and we'll talk about doing a nuclear perfusion imaging study, stress test or further testing.    He voices understanding, I do not think he would need to take an aspirin at his young age with very atypical symptoms

## 2020-03-17 ENCOUNTER — HOSPITAL ENCOUNTER (OUTPATIENT)
Dept: CT IMAGING | Age: 65
Discharge: HOME | End: 2020-03-17
Attending: ORTHOPAEDIC SURGERY
Payer: MEDICARE

## 2020-03-17 DIAGNOSIS — M19.019 OSTEOARTHRITIS OF SHOULDER: ICD-10-CM

## 2020-03-17 PROCEDURE — 73200 CT UPPER EXTREMITY W/O DYE: CPT

## 2020-06-11 ENCOUNTER — HOSPITAL ENCOUNTER (OUTPATIENT)
Dept: PREADMISSION TESTING | Age: 65
Discharge: HOME | End: 2020-06-11
Payer: MEDICARE

## 2020-06-11 VITALS
DIASTOLIC BLOOD PRESSURE: 90 MMHG | HEART RATE: 77 BPM | HEIGHT: 69 IN | BODY MASS INDEX: 41.47 KG/M2 | TEMPERATURE: 98 F | WEIGHT: 280 LBS | SYSTOLIC BLOOD PRESSURE: 155 MMHG | RESPIRATION RATE: 20 BRPM

## 2020-06-11 LAB
ABO + RH BLD: NORMAL
BLOOD GROUP ANTIBODIES SERPL: NORMAL
INR PPP: 1 (ref 0.9–1.1)
PROTHROMBIN TIME: 10.3 SEC (ref 9–11.1)
SPECIMEN EXP DATE BLD: NORMAL

## 2020-06-11 PROCEDURE — 85610 PROTHROMBIN TIME: CPT

## 2020-06-11 PROCEDURE — 36415 COLL VENOUS BLD VENIPUNCTURE: CPT

## 2020-06-11 PROCEDURE — 86900 BLOOD TYPING SEROLOGIC ABO: CPT

## 2020-06-11 RX ORDER — AMLODIPINE BESYLATE 10 MG/1
5 TABLET ORAL DAILY
COMMUNITY

## 2020-06-11 RX ORDER — LOSARTAN POTASSIUM AND HYDROCHLOROTHIAZIDE 25; 100 MG/1; MG/1
1 TABLET ORAL DAILY
COMMUNITY
End: 2022-06-30

## 2020-06-11 RX ORDER — ASPIRIN 81 MG/1
81 TABLET ORAL DAILY
COMMUNITY

## 2020-06-11 RX ORDER — TRIAMCINOLONE ACETONIDE 5 MG/G
OINTMENT TOPICAL
COMMUNITY

## 2020-06-11 NOTE — PERIOP NOTES
Preoperative instructions reviewed with patient, including instructions on medications. Patient given (2) bottles  of Chlorhexidne soultion. Instructions reviewed on use of CHG solution. Patient given SSI infection FAQS sheet, as well as a  MRSA/MSSA treatment instruction sheet  With an explanation to patient that they will be notified if treatment instructions need to be initiated. Patient was given the opportunity to ask questions on the information provided. New diet instructions given and patient voiced understanding of same. PATIENT  MADE AWARE OF COVID-19 TESTING NEEDED TO BE DONE WITHIN 96 HOURS OF SURGERY MADE FOR PATIENT. PAT ADVISED THAT A PAT NURSE WILL CALL HER AND MAKE AN APPOINTMENT FOR HER. PATIENT INSTRUCTED ON SELF QUARANTINE BETWEEN TESTING AND ARRIVAL TIME DAY OF SURGERY. PATIENT GIVEN INFORMATION VERBALLY AND IN WRITING ON THE ARRIVAL PROCESS FOR THE DAY OF SURGERY. VOICED UNDERSTANDING OF SAME.

## 2020-06-12 LAB
BACTERIA SPEC CULT: NORMAL
BACTERIA SPEC CULT: NORMAL
SERVICE CMNT-IMP: NORMAL

## 2020-06-21 ENCOUNTER — HOSPITAL ENCOUNTER (OUTPATIENT)
Dept: PREADMISSION TESTING | Age: 65
Discharge: HOME | End: 2020-06-21
Attending: PHYSICIAN ASSISTANT
Payer: MEDICARE

## 2020-06-21 DIAGNOSIS — Z20.822 ENCOUNTER FOR LABORATORY TESTING FOR COVID-19 VIRUS: ICD-10-CM

## 2020-06-21 PROCEDURE — 87635 SARS-COV-2 COVID-19 AMP PRB: CPT

## 2020-06-22 LAB — SARS-COV-2, COV2NT: NOT DETECTED

## 2020-06-24 ENCOUNTER — ANESTHESIA EVENT (OUTPATIENT)
Dept: SURGERY | Age: 65
DRG: 483 | End: 2020-06-24
Payer: MEDICARE

## 2020-06-25 ENCOUNTER — HOSPITAL ENCOUNTER (INPATIENT)
Age: 65
LOS: 1 days | Discharge: HOME | DRG: 483 | End: 2020-06-26
Attending: ORTHOPAEDIC SURGERY | Admitting: ORTHOPAEDIC SURGERY
Payer: MEDICARE

## 2020-06-25 ENCOUNTER — ANESTHESIA (OUTPATIENT)
Dept: SURGERY | Age: 65
DRG: 483 | End: 2020-06-25
Payer: MEDICARE

## 2020-06-25 DIAGNOSIS — M19.011 PRIMARY OSTEOARTHRITIS OF RIGHT SHOULDER: Primary | ICD-10-CM

## 2020-06-25 LAB
GLUCOSE BLD STRIP.AUTO-MCNC: 105 MG/DL (ref 65–100)
SERVICE CMNT-IMP: ABNORMAL

## 2020-06-25 PROCEDURE — 76010000174 HC OR TIME 3.5 TO 4 HR INTENSV-TIER 1: Performed by: ORTHOPAEDIC SURGERY

## 2020-06-25 PROCEDURE — 77030013079 HC BLNKT BAIR HGGR 3M -A: Performed by: ANESTHESIOLOGY

## 2020-06-25 PROCEDURE — 74011250636 HC RX REV CODE- 250/636: Performed by: ORTHOPAEDIC SURGERY

## 2020-06-25 PROCEDURE — 74011250637 HC RX REV CODE- 250/637: Performed by: ORTHOPAEDIC SURGERY

## 2020-06-25 PROCEDURE — 77030031139 HC SUT VCRL2 J&J -A: Performed by: ORTHOPAEDIC SURGERY

## 2020-06-25 PROCEDURE — 77030032497 HC WRP SHLDR WO BGS SOLM -B

## 2020-06-25 PROCEDURE — 74011000258 HC RX REV CODE- 258: Performed by: ORTHOPAEDIC SURGERY

## 2020-06-25 PROCEDURE — 65270000029 HC RM PRIVATE

## 2020-06-25 PROCEDURE — 76210000006 HC OR PH I REC 0.5 TO 1 HR: Performed by: ORTHOPAEDIC SURGERY

## 2020-06-25 PROCEDURE — 82962 GLUCOSE BLOOD TEST: CPT

## 2020-06-25 PROCEDURE — 77030036560 HC SHLDR ARM PAD ABDUCTN S2SG -B: Performed by: ORTHOPAEDIC SURGERY

## 2020-06-25 PROCEDURE — 0RRJ0JZ REPLACEMENT OF RIGHT SHOULDER JOINT WITH SYNTHETIC SUBSTITUTE, OPEN APPROACH: ICD-10-PCS | Performed by: ORTHOPAEDIC SURGERY

## 2020-06-25 PROCEDURE — 77030018673: Performed by: ORTHOPAEDIC SURGERY

## 2020-06-25 PROCEDURE — 77030036638 HC ACC KT GPS KNE V2 EXAC -D: Performed by: ORTHOPAEDIC SURGERY

## 2020-06-25 PROCEDURE — 74011000272 HC RX REV CODE- 272: Performed by: ORTHOPAEDIC SURGERY

## 2020-06-25 PROCEDURE — 74011000258 HC RX REV CODE- 258: Performed by: NURSE ANESTHETIST, CERTIFIED REGISTERED

## 2020-06-25 PROCEDURE — 74011250636 HC RX REV CODE- 250/636: Performed by: ANESTHESIOLOGY

## 2020-06-25 PROCEDURE — 77030002912 HC SUT ETHBND J&J -A: Performed by: ORTHOPAEDIC SURGERY

## 2020-06-25 PROCEDURE — 74011250636 HC RX REV CODE- 250/636: Performed by: NURSE ANESTHETIST, CERTIFIED REGISTERED

## 2020-06-25 PROCEDURE — C1713 ANCHOR/SCREW BN/BN,TIS/BN: HCPCS | Performed by: ORTHOPAEDIC SURGERY

## 2020-06-25 PROCEDURE — 77030008684 HC TU ET CUF COVD -B: Performed by: ANESTHESIOLOGY

## 2020-06-25 PROCEDURE — 77030027138 HC INCENT SPIROMETER -A

## 2020-06-25 PROCEDURE — C1776 JOINT DEVICE (IMPLANTABLE): HCPCS | Performed by: ORTHOPAEDIC SURGERY

## 2020-06-25 PROCEDURE — 77030019908 HC STETH ESOPH SIMS -A: Performed by: ANESTHESIOLOGY

## 2020-06-25 PROCEDURE — 77030018846 HC SOL IRR STRL H20 ICUM -A: Performed by: ORTHOPAEDIC SURGERY

## 2020-06-25 PROCEDURE — 74011000250 HC RX REV CODE- 250: Performed by: NURSE ANESTHETIST, CERTIFIED REGISTERED

## 2020-06-25 PROCEDURE — 51798 US URINE CAPACITY MEASURE: CPT

## 2020-06-25 PROCEDURE — 77030018836 HC SOL IRR NACL ICUM -A: Performed by: ORTHOPAEDIC SURGERY

## 2020-06-25 PROCEDURE — 77030019905 HC CATH URETH INTMIT MDII -A

## 2020-06-25 PROCEDURE — 76060000038 HC ANESTHESIA 3.5 TO 4 HR: Performed by: ORTHOPAEDIC SURGERY

## 2020-06-25 PROCEDURE — 77030006835 HC BLD SAW SAG STRY -B: Performed by: ORTHOPAEDIC SURGERY

## 2020-06-25 PROCEDURE — 74011000250 HC RX REV CODE- 250: Performed by: ORTHOPAEDIC SURGERY

## 2020-06-25 PROCEDURE — 77030040361 HC SLV COMPR DVT MDII -B: Performed by: ORTHOPAEDIC SURGERY

## 2020-06-25 PROCEDURE — 77030010396 HC WRE FIX C CNMD -A: Performed by: ORTHOPAEDIC SURGERY

## 2020-06-25 PROCEDURE — 77030002922 HC SUT FBRWRE ARTH -B: Performed by: ORTHOPAEDIC SURGERY

## 2020-06-25 PROCEDURE — 77030011640 HC PAD GRND REM COVD -A: Performed by: ORTHOPAEDIC SURGERY

## 2020-06-25 PROCEDURE — 77030002933 HC SUT MCRYL J&J -A: Performed by: ORTHOPAEDIC SURGERY

## 2020-06-25 PROCEDURE — 77030026438 HC STYL ET INTUB CARD -A: Performed by: ANESTHESIOLOGY

## 2020-06-25 DEVICE — CEMENT BONE 70GM FULL DOSE CA PHOS W/ GENTMYCN HI VISC N: Type: IMPLANTABLE DEVICE | Site: SHOULDER | Status: FUNCTIONAL

## 2020-06-25 DEVICE — PLATE BNE 4.5MM ANAT REPLICATOR O/S EQUINOXE: Type: IMPLANTABLE DEVICE | Site: SHOULDER | Status: FUNCTIONAL

## 2020-06-25 DEVICE — SHOULDER S1 TOT STD ANAT -- IMPL CAPPED S1: Type: IMPLANTABLE DEVICE | Status: FUNCTIONAL

## 2020-06-25 DEVICE — SCREW BNE AD PED L47MM DIA15MM CANC SHLDR NONLOCKING: Type: IMPLANTABLE DEVICE | Site: SHOULDER | Status: FUNCTIONAL

## 2020-06-25 DEVICE — HEAD HUM DIA50MM SH SHLDR FOR HEMIARTHROPLASTY EQUINOXE: Type: IMPLANTABLE DEVICE | Site: SHOULDER | Status: FUNCTIONAL

## 2020-06-25 RX ORDER — HYDROMORPHONE HYDROCHLORIDE 1 MG/ML
0.2 INJECTION, SOLUTION INTRAMUSCULAR; INTRAVENOUS; SUBCUTANEOUS
Status: DISCONTINUED | OUTPATIENT
Start: 2020-06-25 | End: 2020-06-25 | Stop reason: HOSPADM

## 2020-06-25 RX ORDER — LIDOCAINE HYDROCHLORIDE 10 MG/ML
0.1 INJECTION, SOLUTION EPIDURAL; INFILTRATION; INTRACAUDAL; PERINEURAL AS NEEDED
Status: DISCONTINUED | OUTPATIENT
Start: 2020-06-25 | End: 2020-06-25 | Stop reason: HOSPADM

## 2020-06-25 RX ORDER — ONDANSETRON 2 MG/ML
INJECTION INTRAMUSCULAR; INTRAVENOUS AS NEEDED
Status: DISCONTINUED | OUTPATIENT
Start: 2020-06-25 | End: 2020-06-25 | Stop reason: HOSPADM

## 2020-06-25 RX ORDER — SODIUM CHLORIDE 0.9 % (FLUSH) 0.9 %
5-40 SYRINGE (ML) INJECTION EVERY 8 HOURS
Status: DISCONTINUED | OUTPATIENT
Start: 2020-06-25 | End: 2020-06-25 | Stop reason: HOSPADM

## 2020-06-25 RX ORDER — CEFAZOLIN SODIUM/WATER 2 G/20 ML
2 SYRINGE (ML) INTRAVENOUS ONCE
Status: DISCONTINUED | OUTPATIENT
Start: 2020-06-25 | End: 2020-06-25 | Stop reason: HOSPADM

## 2020-06-25 RX ORDER — NEOSTIGMINE METHYLSULFATE 1 MG/ML
INJECTION INTRAVENOUS AS NEEDED
Status: DISCONTINUED | OUTPATIENT
Start: 2020-06-25 | End: 2020-06-25 | Stop reason: HOSPADM

## 2020-06-25 RX ORDER — MIDAZOLAM HYDROCHLORIDE 1 MG/ML
1 INJECTION, SOLUTION INTRAMUSCULAR; INTRAVENOUS AS NEEDED
Status: DISCONTINUED | OUTPATIENT
Start: 2020-06-25 | End: 2020-06-25 | Stop reason: HOSPADM

## 2020-06-25 RX ORDER — ROPIVACAINE HYDROCHLORIDE 5 MG/ML
30 INJECTION, SOLUTION EPIDURAL; INFILTRATION; PERINEURAL AS NEEDED
Status: COMPLETED | OUTPATIENT
Start: 2020-06-25 | End: 2020-06-25

## 2020-06-25 RX ORDER — PROPOFOL 10 MG/ML
INJECTION, EMULSION INTRAVENOUS AS NEEDED
Status: DISCONTINUED | OUTPATIENT
Start: 2020-06-25 | End: 2020-06-25 | Stop reason: HOSPADM

## 2020-06-25 RX ORDER — HYDROXYZINE HYDROCHLORIDE 10 MG/1
10 TABLET, FILM COATED ORAL
Status: DISCONTINUED | OUTPATIENT
Start: 2020-06-25 | End: 2020-06-26 | Stop reason: HOSPADM

## 2020-06-25 RX ORDER — LEVOTHYROXINE SODIUM 100 UG/1
100 TABLET ORAL
Status: DISCONTINUED | OUTPATIENT
Start: 2020-06-26 | End: 2020-06-26 | Stop reason: HOSPADM

## 2020-06-25 RX ORDER — MORPHINE SULFATE 4 MG/ML
INJECTION INTRAVENOUS AS NEEDED
Status: DISCONTINUED | OUTPATIENT
Start: 2020-06-25 | End: 2020-06-25 | Stop reason: HOSPADM

## 2020-06-25 RX ORDER — SODIUM CHLORIDE, SODIUM LACTATE, POTASSIUM CHLORIDE, CALCIUM CHLORIDE 600; 310; 30; 20 MG/100ML; MG/100ML; MG/100ML; MG/100ML
50 INJECTION, SOLUTION INTRAVENOUS CONTINUOUS
Status: DISCONTINUED | OUTPATIENT
Start: 2020-06-25 | End: 2020-06-25 | Stop reason: HOSPADM

## 2020-06-25 RX ORDER — KETOROLAC TROMETHAMINE 30 MG/ML
15 INJECTION, SOLUTION INTRAMUSCULAR; INTRAVENOUS EVERY 6 HOURS
Status: COMPLETED | OUTPATIENT
Start: 2020-06-25 | End: 2020-06-26

## 2020-06-25 RX ORDER — FACIAL-BODY WIPES
10 EACH TOPICAL DAILY PRN
Status: DISCONTINUED | OUTPATIENT
Start: 2020-06-27 | End: 2020-06-26 | Stop reason: HOSPADM

## 2020-06-25 RX ORDER — THERA TABS 400 MCG
1 TAB ORAL DAILY
Status: DISCONTINUED | OUTPATIENT
Start: 2020-06-26 | End: 2020-06-26 | Stop reason: HOSPADM

## 2020-06-25 RX ORDER — LOSARTAN POTASSIUM 50 MG/1
100 TABLET ORAL DAILY
Status: DISCONTINUED | OUTPATIENT
Start: 2020-06-26 | End: 2020-06-26 | Stop reason: HOSPADM

## 2020-06-25 RX ORDER — HYDROCHLOROTHIAZIDE 25 MG/1
25 TABLET ORAL DAILY
Status: DISCONTINUED | OUTPATIENT
Start: 2020-06-26 | End: 2020-06-26 | Stop reason: HOSPADM

## 2020-06-25 RX ORDER — KETOROLAC TROMETHAMINE 30 MG/ML
INJECTION, SOLUTION INTRAMUSCULAR; INTRAVENOUS AS NEEDED
Status: DISCONTINUED | OUTPATIENT
Start: 2020-06-25 | End: 2020-06-25 | Stop reason: HOSPADM

## 2020-06-25 RX ORDER — FENTANYL CITRATE 50 UG/ML
50 INJECTION, SOLUTION INTRAMUSCULAR; INTRAVENOUS AS NEEDED
Status: DISCONTINUED | OUTPATIENT
Start: 2020-06-25 | End: 2020-06-25 | Stop reason: HOSPADM

## 2020-06-25 RX ORDER — SODIUM CHLORIDE 0.9 % (FLUSH) 0.9 %
5-40 SYRINGE (ML) INJECTION AS NEEDED
Status: DISCONTINUED | OUTPATIENT
Start: 2020-06-25 | End: 2020-06-26 | Stop reason: HOSPADM

## 2020-06-25 RX ORDER — HYDROMORPHONE HYDROCHLORIDE 1 MG/ML
1 INJECTION, SOLUTION INTRAMUSCULAR; INTRAVENOUS; SUBCUTANEOUS
Status: DISCONTINUED | OUTPATIENT
Start: 2020-06-25 | End: 2020-06-26 | Stop reason: HOSPADM

## 2020-06-25 RX ORDER — ROCURONIUM BROMIDE 10 MG/ML
INJECTION, SOLUTION INTRAVENOUS AS NEEDED
Status: DISCONTINUED | OUTPATIENT
Start: 2020-06-25 | End: 2020-06-25 | Stop reason: HOSPADM

## 2020-06-25 RX ORDER — CLOBETASOL PROPIONATE 0.5 MG/G
CREAM TOPICAL
Status: DISCONTINUED | OUTPATIENT
Start: 2020-06-25 | End: 2020-06-26 | Stop reason: HOSPADM

## 2020-06-25 RX ORDER — AMOXICILLIN 250 MG
1 CAPSULE ORAL 2 TIMES DAILY
Status: DISCONTINUED | OUTPATIENT
Start: 2020-06-25 | End: 2020-06-26 | Stop reason: HOSPADM

## 2020-06-25 RX ORDER — GLYCOPYRROLATE 0.2 MG/ML
INJECTION INTRAMUSCULAR; INTRAVENOUS AS NEEDED
Status: DISCONTINUED | OUTPATIENT
Start: 2020-06-25 | End: 2020-06-25 | Stop reason: HOSPADM

## 2020-06-25 RX ORDER — SODIUM CHLORIDE 0.9 % (FLUSH) 0.9 %
5-40 SYRINGE (ML) INJECTION EVERY 8 HOURS
Status: DISCONTINUED | OUTPATIENT
Start: 2020-06-25 | End: 2020-06-26 | Stop reason: HOSPADM

## 2020-06-25 RX ORDER — MORPHINE SULFATE 10 MG/ML
2 INJECTION, SOLUTION INTRAMUSCULAR; INTRAVENOUS
Status: DISCONTINUED | OUTPATIENT
Start: 2020-06-25 | End: 2020-06-25 | Stop reason: HOSPADM

## 2020-06-25 RX ORDER — FENTANYL CITRATE 50 UG/ML
25 INJECTION, SOLUTION INTRAMUSCULAR; INTRAVENOUS
Status: DISCONTINUED | OUTPATIENT
Start: 2020-06-25 | End: 2020-06-25 | Stop reason: HOSPADM

## 2020-06-25 RX ORDER — OXYCODONE HYDROCHLORIDE 5 MG/1
10 TABLET ORAL
Status: DISCONTINUED | OUTPATIENT
Start: 2020-06-25 | End: 2020-06-26 | Stop reason: HOSPADM

## 2020-06-25 RX ORDER — ACETAMINOPHEN 325 MG/1
650 TABLET ORAL
Status: DISCONTINUED | OUTPATIENT
Start: 2020-06-25 | End: 2020-06-26 | Stop reason: HOSPADM

## 2020-06-25 RX ORDER — AMLODIPINE BESYLATE 5 MG/1
10 TABLET ORAL DAILY
Status: DISCONTINUED | OUTPATIENT
Start: 2020-06-26 | End: 2020-06-26 | Stop reason: HOSPADM

## 2020-06-25 RX ORDER — GENTAMICIN SULFATE 40 MG/ML
80 INJECTION, SOLUTION INTRAMUSCULAR; INTRAVENOUS ONCE
Status: COMPLETED | OUTPATIENT
Start: 2020-06-25 | End: 2020-06-25

## 2020-06-25 RX ORDER — SUCCINYLCHOLINE CHLORIDE 20 MG/ML
INJECTION INTRAMUSCULAR; INTRAVENOUS AS NEEDED
Status: DISCONTINUED | OUTPATIENT
Start: 2020-06-25 | End: 2020-06-25 | Stop reason: HOSPADM

## 2020-06-25 RX ORDER — PRAVASTATIN SODIUM 20 MG/1
20 TABLET ORAL
Status: DISCONTINUED | OUTPATIENT
Start: 2020-06-25 | End: 2020-06-26 | Stop reason: HOSPADM

## 2020-06-25 RX ORDER — FENTANYL CITRATE 50 UG/ML
INJECTION, SOLUTION INTRAMUSCULAR; INTRAVENOUS AS NEEDED
Status: DISCONTINUED | OUTPATIENT
Start: 2020-06-25 | End: 2020-06-25 | Stop reason: HOSPADM

## 2020-06-25 RX ORDER — DEXAMETHASONE SODIUM PHOSPHATE 4 MG/ML
INJECTION, SOLUTION INTRA-ARTICULAR; INTRALESIONAL; INTRAMUSCULAR; INTRAVENOUS; SOFT TISSUE AS NEEDED
Status: DISCONTINUED | OUTPATIENT
Start: 2020-06-25 | End: 2020-06-25 | Stop reason: HOSPADM

## 2020-06-25 RX ORDER — SODIUM CHLORIDE 0.9 % (FLUSH) 0.9 %
5-40 SYRINGE (ML) INJECTION AS NEEDED
Status: DISCONTINUED | OUTPATIENT
Start: 2020-06-25 | End: 2020-06-25 | Stop reason: HOSPADM

## 2020-06-25 RX ORDER — OXYCODONE HYDROCHLORIDE 5 MG/1
5 TABLET ORAL
Status: DISCONTINUED | OUTPATIENT
Start: 2020-06-25 | End: 2020-06-26 | Stop reason: HOSPADM

## 2020-06-25 RX ORDER — BACITRACIN 500 [USP'U]/G
OINTMENT TOPICAL AS NEEDED
Status: DISCONTINUED | OUTPATIENT
Start: 2020-06-25 | End: 2020-06-25 | Stop reason: HOSPADM

## 2020-06-25 RX ORDER — SODIUM CHLORIDE 9 MG/ML
125 INJECTION, SOLUTION INTRAVENOUS CONTINUOUS
Status: DISPENSED | OUTPATIENT
Start: 2020-06-25 | End: 2020-06-26

## 2020-06-25 RX ORDER — PHENYLEPHRINE HCL IN 0.9% NACL 0.4MG/10ML
SYRINGE (ML) INTRAVENOUS AS NEEDED
Status: DISCONTINUED | OUTPATIENT
Start: 2020-06-25 | End: 2020-06-25 | Stop reason: HOSPADM

## 2020-06-25 RX ORDER — ONDANSETRON 2 MG/ML
4 INJECTION INTRAMUSCULAR; INTRAVENOUS
Status: DISCONTINUED | OUTPATIENT
Start: 2020-06-25 | End: 2020-06-26 | Stop reason: HOSPADM

## 2020-06-25 RX ORDER — FAMOTIDINE 20 MG/1
20 TABLET, FILM COATED ORAL 2 TIMES DAILY
Status: DISCONTINUED | OUTPATIENT
Start: 2020-06-25 | End: 2020-06-26 | Stop reason: HOSPADM

## 2020-06-25 RX ORDER — TRANEXAMIC ACID 100 MG/ML
INJECTION, SOLUTION INTRAVENOUS AS NEEDED
Status: DISCONTINUED | OUTPATIENT
Start: 2020-06-25 | End: 2020-06-25 | Stop reason: HOSPADM

## 2020-06-25 RX ORDER — ONDANSETRON 2 MG/ML
4 INJECTION INTRAMUSCULAR; INTRAVENOUS AS NEEDED
Status: DISCONTINUED | OUTPATIENT
Start: 2020-06-25 | End: 2020-06-25 | Stop reason: HOSPADM

## 2020-06-25 RX ORDER — NALOXONE HYDROCHLORIDE 0.4 MG/ML
0.4 INJECTION, SOLUTION INTRAMUSCULAR; INTRAVENOUS; SUBCUTANEOUS AS NEEDED
Status: DISCONTINUED | OUTPATIENT
Start: 2020-06-25 | End: 2020-06-26 | Stop reason: HOSPADM

## 2020-06-25 RX ORDER — EPHEDRINE SULFATE/0.9% NACL/PF 50 MG/5 ML
SYRINGE (ML) INTRAVENOUS AS NEEDED
Status: DISCONTINUED | OUTPATIENT
Start: 2020-06-25 | End: 2020-06-25 | Stop reason: HOSPADM

## 2020-06-25 RX ORDER — ASPIRIN 81 MG/1
81 TABLET ORAL DAILY
Status: DISCONTINUED | OUTPATIENT
Start: 2020-06-26 | End: 2020-06-26 | Stop reason: HOSPADM

## 2020-06-25 RX ORDER — ROPIVACAINE HYDROCHLORIDE 5 MG/ML
INJECTION, SOLUTION EPIDURAL; INFILTRATION; PERINEURAL
Status: COMPLETED | OUTPATIENT
Start: 2020-06-25 | End: 2020-06-25

## 2020-06-25 RX ORDER — LIDOCAINE HYDROCHLORIDE 20 MG/ML
INJECTION, SOLUTION EPIDURAL; INFILTRATION; INTRACAUDAL; PERINEURAL AS NEEDED
Status: DISCONTINUED | OUTPATIENT
Start: 2020-06-25 | End: 2020-06-25 | Stop reason: HOSPADM

## 2020-06-25 RX ORDER — POLYETHYLENE GLYCOL 3350 17 G/17G
17 POWDER, FOR SOLUTION ORAL DAILY
Status: DISCONTINUED | OUTPATIENT
Start: 2020-06-26 | End: 2020-06-26 | Stop reason: HOSPADM

## 2020-06-25 RX ADMIN — TRANEXAMIC ACID 1 G: 100 INJECTION, SOLUTION INTRAVENOUS at 08:00

## 2020-06-25 RX ADMIN — MIDAZOLAM HYDROCHLORIDE 2 MG: 2 INJECTION, SOLUTION INTRAMUSCULAR; INTRAVENOUS at 07:22

## 2020-06-25 RX ADMIN — Medication 80 MCG: at 08:00

## 2020-06-25 RX ADMIN — Medication 5 MG: at 08:25

## 2020-06-25 RX ADMIN — FAMOTIDINE 20 MG: 20 TABLET ORAL at 18:03

## 2020-06-25 RX ADMIN — CEFAZOLIN 3 G: 10 INJECTION, POWDER, FOR SOLUTION INTRAVENOUS at 18:09

## 2020-06-25 RX ADMIN — FENTANYL CITRATE 50 MCG: 50 INJECTION INTRAMUSCULAR; INTRAVENOUS at 07:22

## 2020-06-25 RX ADMIN — FENTANYL CITRATE 100 MCG: 50 INJECTION, SOLUTION INTRAMUSCULAR; INTRAVENOUS at 07:42

## 2020-06-25 RX ADMIN — SODIUM CHLORIDE 125 ML/HR: 900 INJECTION, SOLUTION INTRAVENOUS at 21:08

## 2020-06-25 RX ADMIN — MORPHINE SULFATE 4 MG: 4 INJECTION INTRAVENOUS at 10:48

## 2020-06-25 RX ADMIN — PROPOFOL 175 MG: 10 INJECTION, EMULSION INTRAVENOUS at 07:42

## 2020-06-25 RX ADMIN — Medication 5 MG: at 10:08

## 2020-06-25 RX ADMIN — Medication 5 MG: at 08:54

## 2020-06-25 RX ADMIN — DEXAMETHASONE SODIUM PHOSPHATE 4 MG: 4 INJECTION, SOLUTION INTRAMUSCULAR; INTRAVENOUS at 07:47

## 2020-06-25 RX ADMIN — ROPIVACAINE HYDROCHLORIDE 150 MG: 5 INJECTION, SOLUTION EPIDURAL; INFILTRATION; PERINEURAL at 07:25

## 2020-06-25 RX ADMIN — PHENYLEPHRINE HYDROCHLORIDE 40 MCG/MIN: 10 INJECTION INTRAVENOUS at 08:04

## 2020-06-25 RX ADMIN — ROPIVACAINE HYDROCHLORIDE 30 ML: 5 INJECTION, SOLUTION EPIDURAL; INFILTRATION; PERINEURAL at 07:27

## 2020-06-25 RX ADMIN — ONDANSETRON HYDROCHLORIDE 4 MG: 2 INJECTION, SOLUTION INTRAMUSCULAR; INTRAVENOUS at 10:48

## 2020-06-25 RX ADMIN — ONDANSETRON 4 MG: 2 INJECTION INTRAMUSCULAR; INTRAVENOUS at 11:32

## 2020-06-25 RX ADMIN — SODIUM CHLORIDE, POTASSIUM CHLORIDE, SODIUM LACTATE AND CALCIUM CHLORIDE: 600; 310; 30; 20 INJECTION, SOLUTION INTRAVENOUS at 07:20

## 2020-06-25 RX ADMIN — OXYCODONE 5 MG: 5 TABLET ORAL at 21:08

## 2020-06-25 RX ADMIN — CEFAZOLIN 3 G: 1 INJECTION, POWDER, FOR SOLUTION INTRAMUSCULAR; INTRAVENOUS; PARENTERAL at 08:00

## 2020-06-25 RX ADMIN — NEOSTIGMINE METHYLSULFATE 3 MG: 1 INJECTION, SOLUTION INTRAVENOUS at 10:20

## 2020-06-25 RX ADMIN — SODIUM CHLORIDE 125 ML/HR: 900 INJECTION, SOLUTION INTRAVENOUS at 11:30

## 2020-06-25 RX ADMIN — KETOROLAC TROMETHAMINE 15 MG: 30 INJECTION, SOLUTION INTRAMUSCULAR at 18:03

## 2020-06-25 RX ADMIN — PRAVASTATIN SODIUM 20 MG: 20 TABLET ORAL at 21:08

## 2020-06-25 RX ADMIN — SUCCINYLCHOLINE CHLORIDE 160 MG: 20 INJECTION, SOLUTION INTRAMUSCULAR; INTRAVENOUS at 07:42

## 2020-06-25 RX ADMIN — GLYCOPYRROLATE 0.4 MG: 0.2 INJECTION, SOLUTION INTRAMUSCULAR; INTRAVENOUS at 10:20

## 2020-06-25 RX ADMIN — LIDOCAINE HYDROCHLORIDE 40 MG: 20 INJECTION, SOLUTION EPIDURAL; INFILTRATION; INTRACAUDAL; PERINEURAL at 07:42

## 2020-06-25 RX ADMIN — ROCURONIUM BROMIDE 10 MG: 10 SOLUTION INTRAVENOUS at 07:42

## 2020-06-25 RX ADMIN — ROCURONIUM BROMIDE 20 MG: 10 SOLUTION INTRAVENOUS at 08:59

## 2020-06-25 RX ADMIN — DOCUSATE SODIUM 50MG AND SENNOSIDES 8.6MG 1 TABLET: 8.6; 5 TABLET, FILM COATED ORAL at 18:03

## 2020-06-25 RX ADMIN — KETOROLAC TROMETHAMINE 30 MG: 30 INJECTION, SOLUTION INTRAMUSCULAR; INTRAVENOUS at 10:48

## 2020-06-25 NOTE — ANESTHESIA PREPROCEDURE EVALUATION
Relevant Problems   No relevant active problems       Anesthetic History   No history of anesthetic complications            Review of Systems / Medical History  Patient summary reviewed, nursing notes reviewed and pertinent labs reviewed    Pulmonary  Within defined limits                 Neuro/Psych   Within defined limits           Cardiovascular    Hypertension: well controlled              Exercise tolerance: >4 METS     GI/Hepatic/Renal     GERD           Endo/Other      Hypothyroidism  Morbid obesity and arthritis     Other Findings              Physical Exam    Airway  Mallampati: II  TM Distance: > 6 cm  Neck ROM: normal range of motion   Mouth opening: Normal     Cardiovascular    Rhythm: regular  Rate: normal         Dental  No notable dental hx       Pulmonary  Breath sounds clear to auscultation               Abdominal         Other Findings            Anesthetic Plan    ASA: 3  Anesthesia type: general and regional - supraclavicular block          Induction: Intravenous  Anesthetic plan and risks discussed with: Patient

## 2020-06-25 NOTE — PERIOP NOTES
TRANSFER - OUT REPORT:    Verbal report given to KELSIE Mojica(name) on Ritchie Cho  being transferred to (unit) for routine post - op       Report consisted of patients Situation, Background, Assessment and   Recommendations(SBAR). Time Pre op antibiotic given:0800  Anesthesia Stop time: 2483  Pop Present on Transfer to floor:n/a  Order for Pop on Chart:n/a  Discharge Prescriptions with Chart:n/a    Information from the following report(s) SBAR, Kardex, OR Summary, Procedure Summary, Intake/Output, MAR, Recent Results and Cardiac Rhythm SR was reviewed with the receiving nurse. Opportunity for questions and clarification was provided. Is the patient on 02? YES       L/Min 2    Is the patient on a monitor? NO    Is the nurse transporting with the patient? NO    Surgical Waiting Area notified of patient's transfer from PACU? YES      The following personal items collected during your admission accompanied patient upon transfer:   Dental Appliance: Dental Appliances: Other (comment)(bag of clothes returned to pt in PACU)  Vision:    Hearing Aid:    Jewelry: Jewelry: None  Clothing: Clothing: Other (comment)(clothes and shoes to pacu)  Other Valuables:  Other Valuables: Eyeglasses(returned to pt in PACU)  Valuables sent to safe:

## 2020-06-25 NOTE — PROGRESS NOTES
TRANSFER - IN REPORT:    Verbal report received from Jing(name) on Byrd Sandifer  being received from PACU(unit) for routine post - op      Report consisted of patients Situation, Background, Assessment and   Recommendations(SBAR). Information from the following report(s) SBAR, Kardex, Procedure Summary and Intake/Output was reviewed with the receiving nurse. Opportunity for questions and clarification was provided. Assessment completed upon patients arrival to unit and care assumed.      Primary Nurse Brittany Dos Santos RN and Cris Tang RN performed a dual skin assessment on this patient No impairment noted  Rob score is 20

## 2020-06-25 NOTE — H&P
Patient: Stefani Leblanc MRN: [id number]  SSN: [id number] YOB: 1955  Age: 71 y.o. Sex: male History and Physical 
 
Stefani Leblanc is a 71 y.o. male having Procedure(s): RIGHT TOTAL SHOULDER ARTHROPLASTY. Allergies: Allergies Allergen Reactions  Pcn [Penicillins] Rash Chief Complaint: *** History of Present Illness: *** Past Medical History:  
Diagnosis Date  Arthritis  Cancer ([facility] [address] MELANOMA RIGHT EAR  Chronic pain KNEES, SHOULDERS  
 GERD (gastroesophageal reflux disease)  High cholesterol  Hypertension  Thyroid disease Past Surgical History:  
Procedure Laterality Date  HX COLONOSCOPY  2014  HX HEENT  12/2006 THYROIDECTOMY  HX KNEE REPLACEMENT Left  HX OTHER SURGICAL Right 05/2010 EXC MELANOMA EAR  
 HX TONSILLECTOMY  CHILD Family History Problem Relation Age of Onset  Hypertension Mother  Cancer Father   
     MULT. MYELOMA  Diabetes Maternal Grandmother  Cancer Paternal Grandfather LUNG CA  
 Anesth Problems Neg Hx  Asthma Neg Hx   
 Heart Disease Neg Hx Social History Tobacco Use  Smoking status: Never Smoker  Smokeless tobacco: Never Used Substance Use Topics  Alcohol use: Yes Comment: 0-3 DRINK PER MONTH Prior to Admission medications Medication Sig Start Date End Date Taking? Authorizing Provider  
losartan-hydroCHLOROthiazide (HYZAAR) 100-25 mg per tablet Take 1 Tab by mouth daily. Yes Provider, Historical  
amLODIPine (NORVASC) 10 mg tablet Take 10 mg by mouth daily. Yes Provider, Historical  
levothyroxine (SYNTHROID) 100 mcg tablet Take 100 mcg by mouth Daily (before breakfast). Yes Provider, Historical  
pravastatin (PRAVACHOL) 20 mg tablet Take 20 mg by mouth nightly.    Yes Provider, Historical  
triamcinolone acetonide (KENALOG) 0.5 % ointment Apply  to affected area daily as needed for Skin Irritation. use thin layer    Provider, Historical  
aspirin delayed-release 81 mg tablet Take 81 mg by mouth daily. Provider, Historical  
MULTIVIT-MIN/FOLIC/VIT K/LYCOP (ONE-A-DAY MEN'S MULTIVITAMIN PO) Take 1 Tab by mouth daily. Provider, Historical  
clobetasol (CLOBEX) 0.05 % lotion Apply  to affected area as needed for Itching. Provider, Historical  
  
 
Visit Vitals /81 Pulse 86 Temp 37 °C (98.6 °F) Resp 18 Wt 127 kg (280 lb) SpO2 97% BMI 41.35 kg/m² Assessment and Plan:  
Jose A Aguilar is a 71 y.o. male having Procedure(s): RIGHT TOTAL SHOULDER ARTHROPLASTY for ***. Preanesthesia Evaluation Last edited 06/25/20 0703 by Petra Hendrickson MD 
Date of Service 06/25/20 5649 Relevant Problems No relevant active problems Anesthetic History No history of anesthetic complications Review of Systems / Medical History Patient summary reviewed, nursing notes reviewed and pertinent labs reviewed Pulmonary Within defined limits Neuro/Psych Within defined limits Cardiovascular Hypertension: well controlled Exercise tolerance: >4 METS 
  
GI/Hepatic/Renal 
  
GERD Endo/Other Hypothyroidism Morbid obesity and arthritis Other Findings Physical Exam 
 
Airway Mallampati: II 
TM Distance: > 6 cm Neck ROM: normal range of motion Mouth opening: Normal 
 
 Cardiovascular Rhythm: regular Rate: normal 
 
 
 
 Dental 
No notable dental hx Pulmonary Breath sounds clear to auscultation Abdominal 
 
 
 
 Other Findings Anesthetic Plan ASA: 3 Anesthesia type: general and regional - supraclavicular block Induction: Intravenous Anesthetic plan and risks discussed with: Patient Preanesthesia evaluation performed by Petra Hendrickson MD  
  
 
 
 
Signed By: Elana Del Castillo CRNA June 25, 2020

## 2020-06-25 NOTE — H&P
TE PRE-OP HISTORY AND PHYSICAL    Subjective:     Patient is a 71 y.o. male presented with a history of right shoulder pain. Onset of symptoms was gradual with gradually worsening course since that time. He is being admitted for surgical management of this condition. Patient Active Problem List    Diagnosis Date Noted    Osteoarthritis of left knee 04/23/2018     Past Medical History:   Diagnosis Date    Arthritis     Cancer ([address]     MELANOMA RIGHT EAR    Chronic pain     KNEES, SHOULDERS    GERD (gastroesophageal reflux disease)     High cholesterol     Hypertension     Thyroid disease       Past Surgical History:   Procedure Laterality Date    HX COLONOSCOPY  2014    HX HEENT  12/2006    THYROIDECTOMY    HX KNEE REPLACEMENT Left     HX OTHER SURGICAL Right 05/2010    EXC MELANOMA EAR    HX TONSILLECTOMY  CHILD      Prior to Admission medications    Medication Sig Start Date End Date Taking? Authorizing Provider   losartan-hydroCHLOROthiazide (HYZAAR) 100-25 mg per tablet Take 1 Tab by mouth daily. Yes Provider, Historical   amLODIPine (NORVASC) 10 mg tablet Take 10 mg by mouth daily. Yes Provider, Historical   levothyroxine (SYNTHROID) 100 mcg tablet Take 100 mcg by mouth Daily (before breakfast). Yes Provider, Historical   pravastatin (PRAVACHOL) 20 mg tablet Take 20 mg by mouth nightly. Yes Provider, Historical   triamcinolone acetonide (KENALOG) 0.5 % ointment Apply  to affected area daily as needed for Skin Irritation. use thin layer    Provider, Historical   aspirin delayed-release 81 mg tablet Take 81 mg by mouth daily. Provider, Historical   MULTIVIT-MIN/FOLIC/VIT K/LYCOP (ONE-A-DAY MEN'S MULTIVITAMIN PO) Take 1 Tab by mouth daily. Provider, Historical   clobetasol (CLOBEX) 0.05 % lotion Apply  to affected area as needed for Itching.     Provider, Historical     Allergies   Allergen Reactions    Pcn [Penicillins] Rash      Social History     Tobacco Use    Smoking status: Never Smoker    Smokeless tobacco: Never Used   Substance Use Topics    Alcohol use: Yes     Comment: 0-3 DRINK PER MONTH      Family History   Problem Relation Age of Onset    Hypertension Mother     Cancer Father         MULT. MYELOMA    Diabetes Maternal Grandmother     Cancer Paternal Grandfather         LUNG CA    Anesth Problems Neg Hx     Asthma Neg Hx     Heart Disease Neg Hx       Review of Systems  A comprehensive review of systems was negative except for that written in the HPI. Objective:     Patient Vitals for the past 8 hrs:   BP Temp Pulse Resp SpO2 Weight   06/25/20 0630 154/86 98.6 °F (37 °C) 83 20 96 % 127 kg (280 lb)     Visit Vitals  /86 (BP 1 Location: Left arm, BP Patient Position: At rest;Head of bed elevated (Comment degrees))   Pulse 83   Temp 98.6 °F (37 °C)   Resp 20   Wt 127 kg (280 lb)   SpO2 96%   BMI 41.35 kg/m²     General:  Alert, cooperative, no distress, appears stated age. Head:  Normocephalic, without obvious abnormality, atraumatic. Eyes:  Conjunctivae/corneas clear. PERRL, EOMs intact. Fundi benign   Ears:  Normal TMs and external ear canals both ears. Nose: Nares normal. Septum midline. Mucosa normal. No drainage or sinus tenderness. Throat: Lips, mucosa, and tongue normal. Teeth and gums normal.   Neck: Supple, symmetrical, trachea midline, no adenopathy, thyroid: no enlargement/tenderness/nodules, no carotid bruit and no JVD. Back:   Symmetric, no curvature. ROM normal. No CVA tenderness. Lungs:   Clear to auscultation bilaterally. Chest wall:  No tenderness or deformity. Heart:  Regular rate and rhythm, S1, S2 normal, no murmur, click, rub or gallop. Abdomen:   Soft, non-tender. Bowel sounds normal. No masses,  No organomegaly. Extremities: Right shoulder: pain with ROM. NVI   Pulses: 2+ and symmetric all extremities.    Skin: Skin color, texture, turgor normal. No rashes or lesions   Lymph nodes: Cervical, supraclavicular, and axillary nodes normal.   Neurologic: CNII-XII intact. Normal strength, sensation and reflexes throughout. Assessment:     Active Problems:    * No active hospital problems. *      Plan:     The various methods of treatment have been discussed with the patient and family. After consideration of risks, benefits and other options for treatment, the patient has consented to surgical intervention. Risks of infection, DVT, PE, MI, CVA and unforeseen events described to the patient. Additionally discussed the possibility of not being able to resolve all preop pain. Patient understands metal and plastic will be implanted in the body and understands the potential for revision surgery. Patient wishes to proceed with elective surgery.

## 2020-06-25 NOTE — ANESTHESIA PROCEDURE NOTES
Peripheral Block    Start time: 6/25/2020 7:17 AM  End time: 6/25/2020 7:27 AM  Performed by: Tino Tran MD  Authorized by: Tino Tran MD       Pre-procedure:    Indications: at surgeon's request    Preanesthetic Checklist: patient identified, risks and benefits discussed, site marked, timeout performed, anesthesia consent given and patient being monitored    Timeout Time: 07:17          Block Type:   Block Type:  Supraclavicular  Laterality:  Right  Monitoring:  Oxygen, responsive to questions, standard ASA monitoring, continuous pulse ox, frequent vital sign checks and heart rate  Injection Technique:  Single shot  Procedures: ultrasound guided and nerve stimulator    Prep: chlorhexidine    Location:  Supraclavicular  Needle Type:  Stimuplex  Needle Gauge:  21 G  Needle Localization:  Ultrasound guidance and nerve stimulator  Motor Response: minimal motor response >0.4 mA      Assessment:  Number of attempts:  2  Injection Assessment:  No paresthesia, negative aspiration for CSF, incremental injection every 5 mL, low pressure verified by pressure monitor, no intravascular symptoms, negative aspiration for blood and local visualized surrounding nerve on ultrasound  Patient tolerance:  Patient tolerated the procedure well with no immediate complications

## 2020-06-25 NOTE — OP NOTES
[facility] [facility]  OPERATIVE REPORT    Name:  Taty Jacobs  MR#:  [id number]  :  1955  ACCOUNT #:  [de-identified]  DATE OF SERVICE:  2020    PREOPERATIVE DIAGNOSIS:  End-stage osteoarthritis of the right shoulder. POSTOPERATIVE DIAGNOSIS:  End-stage osteoarthritis of the right shoulder. PROCEDURE PERFORMED:  Right total shoulder arthroplasty. SURGEON:  Ghada Kramer MD    ASSISTANT:  Maria Guadalupe Falcon DO    ANESTHESIA:  General with interscalene block. COMPLICATIONS:  Zero. SPECIMENS REMOVED:  Bone, discarded. IMPLANTS:  Exactech Equinoxe Press-Fit 14 stem with a 4.5 replicator plate and 50 short head. The glenoid was an 8-degree posterior augment caged peg, size large beta curvature. ESTIMATED BLOOD LOSS:  Approximately 150 mL. IV FLUIDS:  Crystalloids were given. PREOPERATIVE MEDICINE:  3 g Ancef, tolerated well. HISTORY:  The patient is a healthy 69-year-old gentleman who presented to my office with bilateral shoulder pain. His x-rays were consistent with end-stage osteoarthritis of both shoulders. His right was worse than the left. He had had previous conservative treatment prior to entering in the office. Based on his examination, which revealed significant pain and loss of range of motion, and correlating this with his x-ray findings, we discussed treatment options. We went over surgical risks versus continuing with nonoperative management. Understanding the risks and benefits, he was leaning more toward surgical intervention but he also had a bad knee and was considering total knee replacement. He came back to my office after he had an effective consultation with his knee doctor. He had, at that time, made a decision to proceed with shoulder surgery before the knee surgery. I talked to him about the surgery and the technique. We went through the extensive recovery.   We talked about the use of the metal and plastic as he was aware given the fact that he had a prior knee replacement performed. We talked in great depth about the risks associated with surgery, including but not limited to, postoperative pain, numbness and tingling, swelling, black and blue pain, DVT, PE, MI, CVA and other unforeseen events could occur in a perioperative fashion. He understood the extensive recovery. There was no guarantee to regain all of his motion nor any guarantee that all of his pain would be reduced. Understanding all the risks and benefits as mentioned above, the patient wished to proceed, signed consent, was taken to surgery. OPERATIVE PLAN:  The patient was taken back to surgery, placed spine on the operating table, and administered general anesthetic with endotracheal intubation. After adequate anesthetic, the patient received 3 g of Ancef after a trial dose and no reaction. The patient was placed in a beach chair position with all bony prominences protected. The right upper extremity was sterilely prepped and draped x2. A time-out was performed. All parties agreed the right shoulder was the correct shoulder. Following the time-out and a sterile prep and drape, an anterior incision was made with a 10-blade. The knife and handle was passed off the table. Blunt dissection was taken down to the subcutaneous tissue to the cephalic vein. Cephalic vein was then identified and retracted laterally. Its collateral contributories were coagulated as the deltopectoral interval was exploited. The clavipectoral fascia was opened in line with the short head of the biceps. At that time, we dissected the short head of the biceps off the subscapularis tendon. The subdeltoid region was then exploited. A Blackman retractor was deployed. We evaluated the rotator cuff. There was no evidence of rotator cuff disease. The rotator cuff interval was opened with a pair of Metzenbaum scissors. 2-0 Vicryl was used to close the circumflex vasculature.   A #2 Ethibond was used to perform stay stitches at the musculotendinous junction of the subscapularis. An arthrotomy was made through the subscapularis using electrocautery. Inferiorly, the shoulder was released from its capsule as the shoulder was externally rotated. Large osteophytes were removed as the shoulder was externally rotated. The entire inferior capsule was released with all the osteophytes being removed. The shoulder was able to be dislocated into the wound. A neck shaft angle was placed on the shoulder. We then used a bone saw to create the osteotomy site. The IM canal of the humerus was entered with a reamer and dilated up to 14 and then broached to 14. The 14 was the correct size. We then placed the 14 press fit with excellent purchase. A manhole cover was placed over the trunnion of the stem. The shoulder was then posteriorly dislocated and a Devante retractor was deployed along the posterior glenoid to hold the retraction. The subscap was released from its coracohumeral ligament attachments as well as the anterior capsule. The capsule was released only along the subscap but not in the pouch. This capsule was then removed along with the anterior and inferior labrum. There were large bony overgrowth loose bodies identified in the subcoracoid region. These were identified on the preoperative x-ray as well as CT scan. Three of these were removed without creating significant dissection to remove them. The subscap was released. An anterior glenoid neck retractor was deployed. The outrigger for the navigation system was attached with bone screws all along the coracoid. We then mapped all the salient points on the shoulder and the computer went through its acquisition. The center of the glenoid was identified. We entered this with our drill. We then reamed to our preoperative parameters for version and superior-inferior inclination.   We reamed all the way up to a size large, which is what we preoperatively templated. After adequate reaming, the peripheral drill sleeve was then inserted and the three peripheral drills were then drilled. We used our depth gauge and then trialed our size large 8-degree posterior augment and this was the appropriate size. Trial component was removed. Of note, the long head of the biceps and superior labrum was significantly degenerative in the superior aspect of the glenoid. A biceps tenotomy was performed and stitches were placed in the long head of the biceps at the top of the pec tendon insertion site. Of note also, a small portion of the pec was released during our exposure for exposure purposes. Following this, we pulsatilely  irrigated out the bone stock of the glenoid. 80 mg of gentamicin was inserted into the capsule. Antibiotic-impregnated cement was mixed in the back table. We cemented the peripheral pegs. Bone graft was placed in the cage component of our glenoid and the glenoid was impacted. Excess cement was removed. The glenoid was secured after curing of the cement. We then irrigated out the wound. The shoulder was brought back up into the anterior aspect of the wound. We placed a 4.5 replicator plate loosely on to the trunnion and dialed in the right version and eccentricity. We then trialed with a 50 short and this was the correct size. We then dislocated one last time. The trial components were removed. We placed our final head in place and reduced the shoulder. The wound was then copiously irrigated. 500 mL of Betadine diluted solution was rinsed to the joint followed by pulsatile irrigation. All parties changed out their outer gloves. The subscap was repaired upon itself using #2 FiberWire in figure-of-eight interrupted fashion. Long head of the biceps was tenodesed and the pectoralis tendon was also repaired in an anatomic fashion using FiberWire.   #1 Vicryl was used to close the deltopectoral interval.  2-0 Vicryl was placed in the subcutaneous tissue, and a running 4-0 Monocryl was placed in the epidermis. Steri-Strips, Adaptic, and bacitracin ointment were applied over the incision. Sterile dry dressing was placed over the shoulder. The patient was placed in immobilizer, awakened from general anesthetic in stable condition and transferred to the recovery room.         Shandra Marquez MD      [id number]  D:  06/25/2020 10:59  T:  06/25/2020 16:28  JOB #:  [id number]

## 2020-06-25 NOTE — PROGRESS NOTES
Ortho Post-Op Note    6/25/2020  4:37 PM    POD:  Day of Surgery  S/P:  Procedure(s):  RIGHT TOTAL SHOULDER ARTHROPLASTY    Afebrile/VSS, NAD, A&O x 3  Morbidly obese  Doing well without complaints of nausea  Pain well controlled  Calves soft/NTTP Bilaterally  RUE soft. In sling and bolster  Thumb is numb but has FROM of the hand, fingers and wrist  Dressing clean and dry  Moving lower extremities well. Neurocirculatory exam intact and within normal range. Lab Results   Component Value Date/Time    HGB 11.9 (L) 04/24/2018 05:00 AM    INR 1.0 06/11/2020 12:39 PM     No results for input(s): CREA, BUN in the last 7224 hours. CrCl cannot be calculated (Patient's most recent lab result is older than the maximum 180 days allowed. ).     PLAN:  DVT prophylaxis: ASA  NWB with PT-mobilization- Codman's, elbow/wrist F/E, no active rom of the shoulder, no passive rom ER, or OH   Pain Control: Tylenol, toradol, oxycodone  Plan to D/C home with HH/PT possibly tomorrow      SARAY Berger

## 2020-06-25 NOTE — ANESTHESIA POSTPROCEDURE EVALUATION
Post-Anesthesia Evaluation and Assessment    Patient: John Patterson MRN: [id number]  SSN: [id number]    YOB: 1955  Age: 71 y.o. Sex: male      I have evaluated the patient and they are stable and ready for discharge from the PACU. Cardiovascular Function/Vital Signs  Visit Vitals  /60   Pulse 90   Temp 36.7 °C (98 °F)   Resp 20   Wt 127 kg (280 lb)   SpO2 100%   BMI 41.35 kg/m²       Patient is status post General anesthesia for Procedure(s):  RIGHT TOTAL SHOULDER ARTHROPLASTY. Nausea/Vomiting: None    Postoperative hydration reviewed and adequate. Pain:  Pain Scale 1: Numeric (0 - 10) (06/25/20 1130)  Pain Intensity 1: 0 (06/25/20 1130)   Managed    Neurological Status:   Neuro (WDL): Exceptions to WDL (06/25/20 1130)  Neuro  Neurologic State: Drowsy; Alert;Eyes open to stimulus; Eyes open spontaneously; Eyes open to voice (06/25/20 1130)  Orientation Level: Oriented X4 (06/25/20 1130)  Cognition: Follows commands (06/25/20 1130)  Speech: Clear (06/25/20 1130)  LUE Motor Response: Purposeful (06/25/20 1130)  LLE Motor Response: Purposeful (06/25/20 1130)  RUE Motor Response: Purposeful;Weak(s/p surgery, can wiggle fingers) (06/25/20 1130)  RLE Motor Response: Purposeful (06/25/20 1130)   At baseline    Mental Status, Level of Consciousness: Alert and  oriented to person, place, and time    Pulmonary Status:   O2 Device: Nasal cannula (06/25/20 1345)   Adequate oxygenation and airway patent    Complications related to anesthesia: None    Post-anesthesia assessment completed. No concerns    Signed By: Gabbie Prieto MD     June 25, 2020              Procedure(s):  RIGHT TOTAL SHOULDER ARTHROPLASTY.     general, regional    <BSHSIANPOST>    INITIAL Post-op Vital signs:   Vitals Value Taken Time   /60 6/25/2020  1:45 PM   Temp 36.7 °C (98 °F) 6/25/2020 11:30 AM   Pulse 91 6/25/2020  2:13 PM   Resp 19 6/25/2020  2:13 PM   SpO2 99 % 6/25/2020  2:13 PM   Vitals shown include unvalidated device data.

## 2020-06-25 NOTE — BRIEF OP NOTE
Brief Postoperative Note    Patient: Alireza Perez  YOB: 1955  MRN: [id number]    Date of Procedure: 6/25/2020     Pre-Op Diagnosis: OA RIGHT SHOULDER    Post-Op Diagnosis: Same as preoperative diagnosis. Procedure(s):  RIGHT TOTAL SHOULDER ARTHROPLASTY    Surgeon(s): Marci Lombard, MD Thedora Peers, DO    Surgical Assistant: None    Anesthesia: General with interscalene block    Estimated Blood Loss (mL): 081     Complications: None    Specimens: bone discarded    Implants:   Implant Name Type Inv.  Item Serial No.  Lot No. LRB No. Used Action   CEMENT BNE SYR FASTSET 70GM --  - SNA  CEMENT BNE SYR FASTSET 70GM --  NA [employer] [id number] Right 1 Implanted   Equinoxe Humeral Stem, Primary Press-fit, 14mm, Exactech Joint Component  [id number] [employer] NA Right 1 Implanted   Equinoxe Cage Glenoid, Posterior Augment, Cemented, Right, Large, [employer] Joint Component  [id number] [employer] NA Right 1 Implanted   SCR KIT BNE SHLDR TORQ DEFIN 2 -- [employer] - [id number]  SCR KIT BNE SHLDR TORQ DEFIN 2 -- Daisha Odor [id number] [employer] NA Right 1 Implanted   PLATE MARJAN REPLICTR 4.4QE O/S -- [employer] - [id number]  PLATE MARJAN REPLICTR 7.1QM O/S -- E[employer] [id number] [employer] NA Right 1 Implanted   HEAD HUM SHORT BETA 50MM -- Daisha Odor - [id number]  HEAD HUM SHORT BETA 50MM -- Daisha Odor [id number] [employer] NA Right 1 Implanted       Drains: * No LDAs found *    Findings: oa    Electronically Signed by Pedro Pablo Menon MD on 6/25/2020 at 10:59 AM

## 2020-06-26 VITALS
DIASTOLIC BLOOD PRESSURE: 78 MMHG | BODY MASS INDEX: 41.35 KG/M2 | WEIGHT: 280 LBS | OXYGEN SATURATION: 94 % | HEART RATE: 85 BPM | TEMPERATURE: 98.2 F | RESPIRATION RATE: 16 BRPM | SYSTOLIC BLOOD PRESSURE: 139 MMHG

## 2020-06-26 LAB — HGB BLD-MCNC: 13.4 G/DL (ref 12.1–17)

## 2020-06-26 PROCEDURE — 97165 OT EVAL LOW COMPLEX 30 MIN: CPT

## 2020-06-26 PROCEDURE — 97110 THERAPEUTIC EXERCISES: CPT

## 2020-06-26 PROCEDURE — 97535 SELF CARE MNGMENT TRAINING: CPT

## 2020-06-26 PROCEDURE — 94760 N-INVAS EAR/PLS OXIMETRY 1: CPT

## 2020-06-26 PROCEDURE — 85018 HEMOGLOBIN: CPT

## 2020-06-26 PROCEDURE — 36415 COLL VENOUS BLD VENIPUNCTURE: CPT

## 2020-06-26 PROCEDURE — 74011250637 HC RX REV CODE- 250/637: Performed by: ORTHOPAEDIC SURGERY

## 2020-06-26 PROCEDURE — 74011000258 HC RX REV CODE- 258: Performed by: ORTHOPAEDIC SURGERY

## 2020-06-26 PROCEDURE — 74011250636 HC RX REV CODE- 250/636: Performed by: ORTHOPAEDIC SURGERY

## 2020-06-26 RX ORDER — OXYCODONE HYDROCHLORIDE 5 MG/1
10 TABLET ORAL
Qty: 40 TAB | Refills: 0 | Status: SHIPPED | OUTPATIENT
Start: 2020-06-26 | End: 2020-06-29

## 2020-06-26 RX ADMIN — ASPIRIN 81 MG: 81 TABLET, COATED ORAL at 09:08

## 2020-06-26 RX ADMIN — KETOROLAC TROMETHAMINE 15 MG: 30 INJECTION, SOLUTION INTRAMUSCULAR at 07:21

## 2020-06-26 RX ADMIN — POLYETHYLENE GLYCOL 3350 17 G: 17 POWDER, FOR SOLUTION ORAL at 09:08

## 2020-06-26 RX ADMIN — HYDROCHLOROTHIAZIDE 25 MG: 25 TABLET ORAL at 09:08

## 2020-06-26 RX ADMIN — KETOROLAC TROMETHAMINE 15 MG: 30 INJECTION, SOLUTION INTRAMUSCULAR at 00:29

## 2020-06-26 RX ADMIN — LOSARTAN POTASSIUM 100 MG: 50 TABLET, FILM COATED ORAL at 09:08

## 2020-06-26 RX ADMIN — OXYCODONE 5 MG: 5 TABLET ORAL at 00:29

## 2020-06-26 RX ADMIN — AMLODIPINE BESYLATE 10 MG: 5 TABLET ORAL at 09:07

## 2020-06-26 RX ADMIN — LEVOTHYROXINE SODIUM 100 MCG: 0.1 TABLET ORAL at 07:21

## 2020-06-26 RX ADMIN — DOCUSATE SODIUM 50MG AND SENNOSIDES 8.6MG 1 TABLET: 8.6; 5 TABLET, FILM COATED ORAL at 09:08

## 2020-06-26 RX ADMIN — KETOROLAC TROMETHAMINE 15 MG: 30 INJECTION, SOLUTION INTRAMUSCULAR at 12:40

## 2020-06-26 RX ADMIN — FAMOTIDINE 20 MG: 20 TABLET ORAL at 09:08

## 2020-06-26 RX ADMIN — OXYCODONE 5 MG: 5 TABLET ORAL at 12:46

## 2020-06-26 RX ADMIN — CEFAZOLIN 3 G: 10 INJECTION, POWDER, FOR SOLUTION INTRAVENOUS at 02:39

## 2020-06-26 RX ADMIN — OXYCODONE 5 MG: 5 TABLET ORAL at 04:06

## 2020-06-26 RX ADMIN — THERA TABS 1 TABLET: TAB at 09:07

## 2020-06-26 RX ADMIN — OXYCODONE 5 MG: 5 TABLET ORAL at 07:21

## 2020-06-26 NOTE — DISCHARGE INSTRUCTIONS
Postoperative Instructions    Medications/Diet    1. Eat only light, non-greasy foods today  2. Take pain medicine with food  3. While taking pain medicines, you may not operate a vehicle, heavy machinery, or appliances  4. While taking pain medicines, you may not drink alcoholic beverages  5. While taking pain medicines, you may not make critical decisions or sign legal papers  6. If you have any reactions to your medicines, stop taking them and call my office immediately  7. If you are not allergic, take one 325mg aspirin twice a day to help prevent blood clots  8. Please keep in mind that constipation is a very common side effect of taking narcotic pain medication. We recommend that patients take precautions to prevent constipation:   Drink plenty of water (6-8 glasses of 8 oz. a day)   Avoid alcohol, caffeine, and dairy products   Eat plenty of fiber (fruits, vegetables and whole grains)   Take an over the counter stool softener (colace or dulcolax)          Activity/Exercise    1. You may move the arm as directed by physical therapist  2. You may take arm out of sling and move elbow as necessary. Must wear sling while out of the house and while sleeping  3. You may change dressing daily. If no drainage, you may leave dressing off.  4. You may shower on post operative day #7  5. Please keep ice applied to the shoulder for the first 72 hours or as long as pain or swelling persist. Do not apply ice directly to skin, or allow water to leak on your dressing    Emergency/Follow-Up    1. Please notify my office at [phone] if you develop any fever (101° or above), unexpected warmth, redness or swelling in your shoulder  2. Please call if your fingers/toes become cold, purple, numb, or there is excessive bleeding  3. Please call the office within 24 hours at [phone] ([phone]) to schedule a follow up appointment next week  4.  Please call the office before 3pm on Friday if you do not have enough pain medicine for the weekend.  Narcotic pain medication cannot be called into your pharmacy and the prescription must be picked up at our office

## 2020-06-26 NOTE — PROGRESS NOTES
POD 1 Day Post-Op    Procedure:  Procedure(s):  RIGHT TOTAL SHOULDER ARTHROPLASTY    Subjective:     Patient doing well. Pain is controlled. Objective:     Blood pressure 137/80, pulse 75, temperature 98.1 °F (36.7 °C), resp. rate 16, weight 127 kg (280 lb), SpO2 95 %. Temp (24hrs), Av.8 °F (36.6 °C), Min:97.6 °F (36.4 °C), Max:98.1 °F (36.7 °C)      Physical Exam:  Examination of the right shoulder reveals that the incision is clean, dry and intact. Sensation is intact to light touch.  mild swelling. Moving elbow, wrist and digits. Sensation intact.     Labs:   Lab Results   Component Value Date/Time    HGB 13.4 2020 02:52 AM         Assessment:     Active Problems:    Osteoarthritis of right shoulder (2020)        Plan/Recommendations/Medical Decision Making:     Continue physical therapy  Ice and elevate  Discharge today

## 2020-06-26 NOTE — PROGRESS NOTES
Progress Note    Patient: Tramaine Crews MRN: [id number]  SSN: [id number]    YOB: 1955  Age: 71 y.o. Sex: male      Admit Date: 6/25/2020    LOS: 1 day     Subjective:     Feeling well. Pain controlled. Block has worn off and sensation/motor has returned. Some urinary retention yesterday, but resolved after straight cath x 1 and now voiding well. Denies n/v. Tolerating PO well. OOB without difficulty. Denies numbness, or tingling. Objective:     Vitals:    06/25/20 1920 06/25/20 2004 06/26/20 0400 06/26/20 0454   BP: 133/83 138/67 137/80    Pulse: 90 87 75    Resp: 16 16 16    Temp: 97.7 °F (36.5 °C) 97.7 °F (36.5 °C) 98.1 °F (36.7 °C)    SpO2: 92% 94% 95% 95%   Weight:            Intake and Output:  Current Shift: 06/25 1901 - 06/26 0700  In: -   Out: 900 [Urine:900]  Last three shifts: 06/24 0701 - 06/25 1900  In: 800 [I.V.:800]  Out: 98     Physical Exam:   GENERAL: alert, cooperative, no distress  LUNG: non-labored breathing  R shoulder: dressing intact, clean, and dry. Ax n. Sensation intact. Motor and sensation intact distally. Radial pulse 2+    Lab/Data Review:    Lab Results   Component Value Date/Time    HGB 13.4 06/26/2020 02:52 AM          Assessment:     Active Problems:    Osteoarthritis of right shoulder (6/25/2020)    POD #1 s/p R TSA    Plan:     Pain control  NWB R UE  Maintain sling. No active motion of shoulder.  ROM of elbow, wrist, and hand encouraged  Likely DC home today      Signed By: Deedee Ramirez DO     June 26, 2020

## 2020-06-26 NOTE — PROGRESS NOTES
OCCUPATIONAL THERAPY EVALUATION/DISCHARGE  Patient: Tramaine Crews (29 y.o. male)  Date: 6/26/2020  Primary Diagnosis: Osteoarthritis of right shoulder, unspecified osteoarthritis type [M19.011]  Procedure(s) (LRB):  RIGHT TOTAL SHOULDER ARTHROPLASTY (Right) 1 Day Post-Op   Precautions:   Fall, NWB    ASSESSMENT  Based on the objective data described below, the patient presents with decreased independence with self care following R TSR. Pt progressed well with mobility at independent level when OOB but needing min A overall for bed mobility which is expected following TSR. Pt completed upper body ADL activities with mod A and LB with supervision. Pt did great with training but appears anxious and needing to review instructions numerous times. Provided education for pendulums, dressing techniques, and positioning. Pt asking for therapist to return to room for additional questions again and reviewed education with patient one more time. He will have support with his daughter at home (she is a PTA) and his wife. Recommend follow up with surgeon and transition to out-patient as recommended by surgeon. Current Level of Function (ADLs/self-care): mod A for upper body ADL    Functional Outcome Measure: The patient scored 85 on the Barthel index outcome measure which is indicative of 15% impairment with ADL activities. Other factors to consider for discharge: none     PLAN :  Recommend with staff: Amari Sanon to chair and up ad soumya    Recommendation for discharge: (in order for the patient to meet his/her long term goals)  No skilled occupational therapy/ follow up rehabilitation needs identified at this time. This discharge recommendation:  Has been made in collaboration with the attending provider and/or case management    IF patient discharges home will need the following DME: none       SUBJECTIVE:   Patient stated I am feeling alright.     OBJECTIVE DATA SUMMARY:   HISTORY:   Past Medical History: Diagnosis Date    Arthritis     Cancer ([facility] [address]     MELANOMA RIGHT EAR    Chronic pain     KNEES, SHOULDERS    GERD (gastroesophageal reflux disease)     High cholesterol     Hypertension     Thyroid disease      Past Surgical History:   Procedure Laterality Date    HX COLONOSCOPY  2014    HX HEENT  12/2006    THYROIDECTOMY    HX KNEE REPLACEMENT Left     HX OTHER SURGICAL Right 05/2010    EXC MELANOMA EAR    HX TONSILLECTOMY  CHILD       Prior Level of Function/Environment/Context: pt is independent at home. He is retired. Expanded or extensive additional review of patient history:   Home Situation  Home Environment: Private residence  # Steps to Enter: 3  One/Two Story Residence: One story  Living Alone: No  Support Systems: Spouse/Significant Other/Partner  Patient Expects to be Discharged to[de-identified] Private residence  Current DME Used/Available at Home: None  Tub or Shower Type: Shower    Hand dominance: Right    EXAMINATION OF PERFORMANCE DEFICITS:  Cognitive/Behavioral Status:  Neurologic State: Alert  Orientation Level: Oriented X4  Cognition: Appropriate for age attention/concentration  Perception: Appears intact  Perseveration: No perseveration noted  Safety/Judgement: Good awareness of safety precautions    Skin: see nursing notes    Edema: none noted    Hearing: Auditory  Auditory Impairment: None    Vision/Perceptual:                           Acuity: Within Defined Limits         Range of Motion:    AROM: Within functional limits(RUE impaired due to TSR)  PROM: Within functional limits(RUE impaired due to TSR)                      Strength:    Strength: Within functional limits(RUE impaired due to TSR)                Coordination:  Coordination: Within functional limits(RUE impaired due to TSR)  Fine Motor Skills-Upper: Right Intact; Left Intact    Gross Motor Skills-Upper: Right Impaired;Left Intact    Tone & Sensation:    Tone: Normal  Sensation: Intact                      Balance:  Sitting: Intact  Standing: Intact    Functional Mobility and Transfers for ADLs:  Bed Mobility:  Supine to Sit: Additional time;Minimum assistance  Sit to Supine: Additional time;Minimum assistance    Transfers:  Sit to Stand: Independent  Stand to Sit: Independent  Bed to Chair: Independent  Bathroom Mobility: Independent  Toilet Transfer : Independent    ADL Assessment:  Feeding: Supervision    Oral Facial Hygiene/Grooming: Supervision    Bathing: Moderate assistance    Upper Body Dressing: Moderate assistance    Lower Body Dressing: Minimum assistance    Toileting: Minimum assistance                ADL Intervention and task modifications:    Dressing Training:  Pt received education and training for daniel dressing techniques following shoulder replacement surgery. Pt provided education for donning button down shirts as well and pull over cotton shirts. Button down shirts: educated to don surgical arm first and then to adjust over his back and don strong arm. Pt educated that he can use both hands to button shirts. T-shirt: educated pt to don over surgical arm first, then adjust over head, and then don strong arm in shirt. Educated patient that a size larger does make this a little easier for home. Overall complete upper body dressing with mod A. Also received education and training for how to properly don abduction wedge sling. Adjust sling for proper comfort and educated pt how to adjust strapping for home. Reviewed this again for questions as a follow up visit. Cognitive Retraining  Safety/Judgement: Good awareness of safety precautions    Therapeutic Exercise:  Pendulum exercises for RUE completed standing with supervision using the counter for support. Exercises including gentle shoulder flexion, abduction/adduction, clockwise circles, and counter-clockwise circles. Pt instructed and demonstrated neck stretching exercises.   Pt completed elbow flexion, supination/prongation, wrist flexion/extension, and active hand flexion/extension. Pt tolerated exercises well and iced following intervention. Provided pt with written instructions for reference for home. Recommend pt to complete 10 reps x 3 sets daily. Functional Measure:  Barthel Index:    Bathin  Bladder: 10  Bowels: 10  Groomin  Dressin  Feeding: 10  Mobility: 15  Stairs: 10  Toilet Use: 10  Transfer (Bed to Chair and Back): 10  Total: 85/100        The Barthel ADL Index: Guidelines  1. The index should be used as a record of what a patient does, not as a record of what a patient could do. 2. The main aim is to establish degree of independence from any help, physical or verbal, however minor and for whatever reason. 3. The need for supervision renders the patient not independent. 4. A patient's performance should be established using the best available evidence. Asking the patient, friends/relatives and nurses are the usual sources, but direct observation and common sense are also important. However direct testing is not needed. 5. Usually the patient's performance over the preceding 24-48 hours is important, but occasionally longer periods will be relevant. 6. Middle categories imply that the patient supplies over 50 per cent of the effort. 7. Use of aids to be independent is allowed. Josefa Berry., Barthel, D.W. (8925). Functional evaluation: the Barthel Index. [facility] (14)2. BESSY GonzálesF, Suzanne Marroquin., Justin Julien., [place], [address] [place] [address] (). Measuring the change indisability after inpatient rehabilitation; comparison of the responsiveness of the Barthel Index and Functional [place] Measure. Journal of Neurology, Neurosurgery, and Psychiatry, 66(4), 518-409. Che Carter, N.J.A, Steve Anders,  W.J.M, & Elizabeth Latif MPandaA. (2004.) Assessment of post-stroke quality of life in cost-effectiveness studies: The usefulness of the Barthel Index and the EuroQoL-5D. Quality of Life Research, 15, 741-60       Occupational Therapy Evaluation Charge Determination   History Examination Decision-Making   LOW Complexity : Brief history review  LOW Complexity : 1-3 performance deficits relating to physical, cognitive , or psychosocial skils that result in activity limitations and / or participation restrictions  LOW Complexity : No comorbidities that affect functional and no verbal or physical assistance needed to complete eval tasks       Based on the above components, the patient evaluation is determined to be of the following complexity level: LOW   Pain Ratin/10 pain in right shoulder    Activity Tolerance:   Good  Please refer to the flowsheet for vital signs taken during this treatment. After treatment patient left in no apparent distress:    Sitting in chair and Caregiver / family present    COMMUNICATION/EDUCATION:   The patients plan of care was discussed with: Registered nurse. Thank you for this referral.  Tanya Veloz OT  Time Calculation: 10 mins  And 56 minutes prior to this last session.

## 2020-06-26 NOTE — PROGRESS NOTES
Bedside and Verbal shift change report given to Ki Smith (oncoming nurse) by Tylor Anguiano (offgoing nurse). Report included the following information SBAR, Kardex, Procedure Summary, Intake/Output and MAR.

## 2020-06-26 NOTE — PROGRESS NOTES
Patient has not voided since pre-surgery, was not straight cathed in OR, and still cannot void. Bladder scanned for 610mL. Straight-cathed for 400mL    2015 Bladder scanned, 0mL

## 2020-06-26 NOTE — PROGRESS NOTES
I have reviewed discharge instructions with the patient. The patient verbalized understanding and had the opportunity to ask questions. The patient left with all belongings including clothing and one paper prescription.

## 2020-06-26 NOTE — PROGRESS NOTES
0730  Bedside and Verbal shift change report given to KELSIE Soriano (oncoming nurse) by Aj Guerrier RN (offgoing nurse). Report included the following information SBAR, Kardex, OR Summary, Procedure Summary, Intake/Output, MAR and Recent Results.

## 2020-06-26 NOTE — PROGRESS NOTES
RUR: 6%    NICHOLE: Discharge home today with wife. Family will transport patient home via car. Care Management Interventions  PCP Verified by CM: Yes  Mode of Transport at Discharge: Other (see comment)(family/car)  Transition of Care Consult (CM Consult): Discharge Planning  MyChart Signup: No  Physical Therapy Consult: No  Occupational Therapy Consult: Yes  Speech Therapy Consult: No  Current Support Network: Lives with Spouse, Own Home  Confirm Follow Up Transport: Family  Discharge Location  Discharge Placement: Home     Reason for Admission:   RIGHT TOTAL SHOULDER ARTHROPLASTY                   RUR Score:   6%                 Plan for utilizing home health:   No home care orders      PCP: First and Last name:  Kim Olivarez MD   Name of Practice:    Are you a current patient: Yes/No: yes   Approximate date of last visit: May 27   Can you participate in a virtual visit with your PCP: yes                    Current Advanced Directive/Advance Care Plan: not on file                         Transition of Care Plan:     Home. CM met with patient. Patient is independent. Plan is for discharge home with wife. No needs expressed at this time. CM delivered IM letter.     LAURA Meraz/CRM

## 2020-06-29 NOTE — DISCHARGE SUMMARY
Discharge Summary     Patient ID:  Hans Doan  [id number]  1 y.o.  1955    Admit Date: 6/25/2020    Discharge Date: 6/29/2020    Admission Diagnoses: Osteoarthritis of right shoulder, unspecified osteoarthritis type [M19.011]    Surgeon: Jaime Cutler MD    Last Procedure: Procedure(s):  RIGHT TOTAL SHOULDER ARTHROPLASTY      Hospital Course: Normal hospital course for this procedure. Significant Diagnostic Studies: none    Discharge Diagnosis: Active Problems:    Osteoarthritis of right shoulder (6/25/2020)         Condition on Discharge: good    Disposition:Home    Followup Care:  Discharge Condition: good  See surgical instructions  Regular Diet  Keep wound clean and dry    Follow-up Information     Follow up With Specialties Details Why Contact Info    Sharyle Pina, [phone]            DC med list:   Discharge Medication List as of 6/26/2020 12:52 PM      START taking these medications    Details   oxyCODONE IR (ROXICODONE) 5 mg immediate release tablet Take 2 Tabs by mouth every four (4) hours as needed for Pain for up to 3 days. Max Daily Amount: 60 mg., Print, Disp-40 Tab, R-0         CONTINUE these medications which have NOT CHANGED    Details   losartan-hydroCHLOROthiazide (HYZAAR) 100-25 mg per tablet Take 1 Tab by mouth daily. , Historical Med      amLODIPine (NORVASC) 10 mg tablet Take 10 mg by mouth daily. , Historical Med      levothyroxine (SYNTHROID) 100 mcg tablet Take 100 mcg by mouth Daily (before breakfast). , Historical Med      pravastatin (PRAVACHOL) 20 mg tablet Take 20 mg by mouth nightly., Historical Med      triamcinolone acetonide (KENALOG) 0.5 % ointment Apply  to affected area daily as needed for Skin Irritation. use thin layer, Historical Med      aspirin delayed-release 81 mg tablet Take 81 mg by mouth daily. , Historical Med      MULTIVIT-MIN/FOLIC/VIT K/LYCOP (ONE-A-DAY MEN'S MULTIVITAMIN PO) Take 1 Tab by mouth daily. , Historical Med      clobetasol (CLOBEX) 0.05 % lotion Apply  to affected area as needed for Itching., Historical Med                Home Going Instructions:   Patient to follow up in one - two weeks. Notify my office if develop fever, chills, redness, unbearble pain or temp.>102. Patient to follow discharge instructions. Patient to call [phone] to set up follow up appointment.         Signed:  Anatoliy Dean MD  6/29/2020  6:07 PM

## 2020-10-19 ENCOUNTER — HOSPITAL ENCOUNTER (OUTPATIENT)
Dept: PREADMISSION TESTING | Age: 65
Discharge: HOME | End: 2020-10-19
Payer: MEDICARE

## 2020-10-19 PROCEDURE — 87635 SARS-COV-2 COVID-19 AMP PRB: CPT

## 2020-10-19 NOTE — PERIOP NOTES
[facility]  Ambulatory Surgery Unit  Pre-operative Instructions for Endo Procedures    Procedure Date  Friday, October 23, 2020            Tentative Arrival Time 0745      1. On the day of your procedure, please report to the Ambulatory Surgery Unit Registration Desk and sign in at your designated time. The Ambulatory Surgery Unit is located in [facility] on the [facility] side of the [facility] across from the [address] [place] [address]. Please have all of your health insurance cards and a photo ID. 2. You must have someone with you to drive you home, as you should not drive a car for 24 hours following anesthesia. Please make arrangements for a responsible adult friend or family member to stay with you for at least the first 24 hours after your procedure. 3. Do not have anything to eat or drink (including water, gum, mints, coffee, juice) after 11:59 PM, Thursday. This may not apply to medications prescribed by your physician. (Please note below the special instructions with medications to take the morning of your procedure.)    4. If applicable, follow the clear liquid diet and bowel prep instructions provided by your physician's office. If you do not have this information, or have any questions, please contact your physician's office. 5. We recommend you do not drink any alcoholic beverages for 24 hours before and after your procedure. 6. Contact your surgeons office for instructions on the following medications: non-steroidal anti-inflammatory drugs (i.e. Advil, Aleve), vitamins, and supplements. (Some surgeons will want you to stop these medications prior to surgery and others may allow you to take them)   **If you are currently taking Plavix, Coumadin, Aspirin and/or other blood-thinning agents, contact your surgeon for instructions. ** Your surgeon will partner with the physician prescribing these medications to determine if it is safe to stop or if you need to continue taking. Please do not stop taking these medications without instructions from your surgeon. 7. In an effort to help prevent surgical site infection, we ask that you shower with an anti-bacterial soap (i.e. Dial or Safeguard) on the morning of your procedure. Do not apply any lotions, powders, or deodorants after showering. 8. Wear comfortable clothes. Wear glasses instead of contacts. Do not bring any jewelry or money (other than copays or fees as instructed). Do not wear make-up, particularly mascara, the morning of your procedure. Wear your hair loose or down, no ponytails, buns, tasha pins or clips. All body piercings must be removed. 9. You should understand that if you do not follow these instructions your procedure may be cancelled. If your physical condition changes (i.e. fever, cold or flu) please contact your surgeon as soon as possible. 10. It is important that you be on time. If a situation occurs where you may be late, or if you have any questions or problems, please call [phone]. 11. Your procedure time may be subject to change. You will receive a phone call the day prior to confirm your arrival time. Special Instructions: Take all medications and inhalers, as prescribed, on the morning of surgery with a sip of water. I understand a pre-operative phone call will be made to verify my procedure time. In the event that I am not available, I give permission for a message to be left on my answering service and/or with another person?       yes    Preop instructions reviewed  Pt verbalized understanding.      ___________________      ___________________      ___________________  (Signature of Patient)          (Witness)                   (Date and Time)

## 2020-10-19 NOTE — PERIOP NOTES
mark sent to Kristina Teixeira NP, PCP Dr. Radha Webber, best number for pt [phone]. Call to Dr. Rita Fowler office, pt unsure is surgery he had was thyroidectomy vs. Parathyroidectomy.

## 2020-10-20 LAB
HEALTH STATUS, XMCV2T: NORMAL
SARS-COV-2, COV2NT: NOT DETECTED
SOURCE, COVRS: NORMAL
SPECIMEN SOURCE, FCOV2M: NORMAL
SPECIMEN TYPE, XMCV1T: NORMAL

## 2020-10-22 ENCOUNTER — ANESTHESIA EVENT (OUTPATIENT)
Dept: SURGERY | Age: 65
End: 2020-10-22
Payer: MEDICARE

## 2020-10-22 RX ORDER — FENTANYL CITRATE 50 UG/ML
25 INJECTION, SOLUTION INTRAMUSCULAR; INTRAVENOUS
Status: CANCELLED | OUTPATIENT
Start: 2020-10-22

## 2020-10-22 RX ORDER — HYDROMORPHONE HYDROCHLORIDE 1 MG/ML
0.5 INJECTION, SOLUTION INTRAMUSCULAR; INTRAVENOUS; SUBCUTANEOUS
Status: CANCELLED | OUTPATIENT
Start: 2020-10-22

## 2020-10-22 RX ORDER — HYDROCODONE BITARTRATE AND ACETAMINOPHEN 5; 325 MG/1; MG/1
1 TABLET ORAL AS NEEDED
Status: CANCELLED | OUTPATIENT
Start: 2020-10-22

## 2020-10-22 RX ORDER — DIPHENHYDRAMINE HYDROCHLORIDE 50 MG/ML
12.5 INJECTION, SOLUTION INTRAMUSCULAR; INTRAVENOUS AS NEEDED
Status: CANCELLED | OUTPATIENT
Start: 2020-10-22 | End: 2020-10-22

## 2020-10-22 RX ORDER — ONDANSETRON 2 MG/ML
4 INJECTION INTRAMUSCULAR; INTRAVENOUS AS NEEDED
Status: CANCELLED | OUTPATIENT
Start: 2020-10-22

## 2020-10-22 RX ORDER — MIDAZOLAM HYDROCHLORIDE 1 MG/ML
0.5 INJECTION, SOLUTION INTRAMUSCULAR; INTRAVENOUS
Status: CANCELLED | OUTPATIENT
Start: 2020-10-22

## 2020-10-22 NOTE — ADVANCED PRACTICE NURSE
ALLEN 5 in PAT phone assessment. Pt denies loud snoring, witnessed apnea while sleeping or ever having been referred for a sleep apnea evaluation. Results faxed to PCP for FU/further recommendations.

## 2020-10-23 ENCOUNTER — ANESTHESIA (OUTPATIENT)
Dept: SURGERY | Age: 65
End: 2020-10-23
Payer: MEDICARE

## 2020-10-23 ENCOUNTER — HOSPITAL ENCOUNTER (OUTPATIENT)
Age: 65
Setting detail: OUTPATIENT SURGERY
Discharge: HOME | End: 2020-10-23
Attending: SPECIALIST | Admitting: SPECIALIST
Payer: MEDICARE

## 2020-10-23 VITALS
BODY MASS INDEX: 40.73 KG/M2 | HEIGHT: 69 IN | DIASTOLIC BLOOD PRESSURE: 67 MMHG | SYSTOLIC BLOOD PRESSURE: 113 MMHG | TEMPERATURE: 97.8 F | RESPIRATION RATE: 16 BRPM | HEART RATE: 76 BPM | OXYGEN SATURATION: 96 % | WEIGHT: 275 LBS

## 2020-10-23 PROBLEM — D37.4 NEOPLASM OF UNCERTAIN BEHAVIOR OF LARGE INTESTINE: Status: ACTIVE | Noted: 2020-10-23

## 2020-10-23 PROCEDURE — 76060000073 HC AMB SURG ANES FIRST 0.5 HR: Performed by: SPECIALIST

## 2020-10-23 PROCEDURE — 74011000250 HC RX REV CODE- 250: Performed by: NURSE ANESTHETIST, CERTIFIED REGISTERED

## 2020-10-23 PROCEDURE — 76210000046 HC AMBSU PH II REC FIRST 0.5 HR: Performed by: SPECIALIST

## 2020-10-23 PROCEDURE — 2709999900 HC NON-CHARGEABLE SUPPLY: Performed by: SPECIALIST

## 2020-10-23 PROCEDURE — 77030021352 HC CBL LD SYS DISP COVD -B: Performed by: SPECIALIST

## 2020-10-23 PROCEDURE — 74011250636 HC RX REV CODE- 250/636: Performed by: NURSE ANESTHETIST, CERTIFIED REGISTERED

## 2020-10-23 PROCEDURE — 88305 TISSUE EXAM BY PATHOLOGIST: CPT

## 2020-10-23 PROCEDURE — 76030000002 HC AMB SURG OR TIME FIRST 0.: Performed by: SPECIALIST

## 2020-10-23 PROCEDURE — 76210000040 HC AMBSU PH I REC FIRST 0.5 HR: Performed by: SPECIALIST

## 2020-10-23 PROCEDURE — 74011250636 HC RX REV CODE- 250/636: Performed by: ANESTHESIOLOGY

## 2020-10-23 PROCEDURE — 77030013992 HC SNR POLYP ENDOSC BSC -B: Performed by: SPECIALIST

## 2020-10-23 RX ORDER — MIDAZOLAM HYDROCHLORIDE 1 MG/ML
1 INJECTION, SOLUTION INTRAMUSCULAR; INTRAVENOUS AS NEEDED
Status: DISCONTINUED | OUTPATIENT
Start: 2020-10-23 | End: 2020-10-23 | Stop reason: HOSPADM

## 2020-10-23 RX ORDER — FENTANYL CITRATE 50 UG/ML
50 INJECTION, SOLUTION INTRAMUSCULAR; INTRAVENOUS AS NEEDED
Status: DISCONTINUED | OUTPATIENT
Start: 2020-10-23 | End: 2020-10-23 | Stop reason: HOSPADM

## 2020-10-23 RX ORDER — LIDOCAINE HYDROCHLORIDE 10 MG/ML
0.1 INJECTION, SOLUTION EPIDURAL; INFILTRATION; INTRACAUDAL; PERINEURAL AS NEEDED
Status: DISCONTINUED | OUTPATIENT
Start: 2020-10-23 | End: 2020-10-23 | Stop reason: HOSPADM

## 2020-10-23 RX ORDER — GLYCOPYRROLATE 0.2 MG/ML
INJECTION INTRAMUSCULAR; INTRAVENOUS AS NEEDED
Status: DISCONTINUED | OUTPATIENT
Start: 2020-10-23 | End: 2020-10-23 | Stop reason: HOSPADM

## 2020-10-23 RX ORDER — SODIUM CHLORIDE, SODIUM LACTATE, POTASSIUM CHLORIDE, CALCIUM CHLORIDE 600; 310; 30; 20 MG/100ML; MG/100ML; MG/100ML; MG/100ML
100 INJECTION, SOLUTION INTRAVENOUS CONTINUOUS
Status: DISCONTINUED | OUTPATIENT
Start: 2020-10-23 | End: 2020-10-23 | Stop reason: HOSPADM

## 2020-10-23 RX ORDER — PROPOFOL 10 MG/ML
INJECTION, EMULSION INTRAVENOUS AS NEEDED
Status: DISCONTINUED | OUTPATIENT
Start: 2020-10-23 | End: 2020-10-23 | Stop reason: HOSPADM

## 2020-10-23 RX ORDER — LIDOCAINE HYDROCHLORIDE 20 MG/ML
INJECTION, SOLUTION EPIDURAL; INFILTRATION; INTRACAUDAL; PERINEURAL AS NEEDED
Status: DISCONTINUED | OUTPATIENT
Start: 2020-10-23 | End: 2020-10-23 | Stop reason: HOSPADM

## 2020-10-23 RX ADMIN — PROPOFOL 50 MG: 10 INJECTION, EMULSION INTRAVENOUS at 09:19

## 2020-10-23 RX ADMIN — PROPOFOL 50 MG: 10 INJECTION, EMULSION INTRAVENOUS at 09:07

## 2020-10-23 RX ADMIN — PROPOFOL 50 MG: 10 INJECTION, EMULSION INTRAVENOUS at 09:15

## 2020-10-23 RX ADMIN — PROPOFOL 150 MG: 10 INJECTION, EMULSION INTRAVENOUS at 09:04

## 2020-10-23 RX ADMIN — LIDOCAINE HYDROCHLORIDE 40 MG: 20 INJECTION, SOLUTION EPIDURAL; INFILTRATION; INTRACAUDAL; PERINEURAL at 09:04

## 2020-10-23 RX ADMIN — SODIUM CHLORIDE, SODIUM LACTATE, POTASSIUM CHLORIDE, AND CALCIUM CHLORIDE 100 ML/HR: 600; 310; 30; 20 INJECTION, SOLUTION INTRAVENOUS at 08:03

## 2020-10-23 RX ADMIN — GLYCOPYRROLATE 0.2 MG: 0.2 INJECTION, SOLUTION INTRAMUSCULAR; INTRAVENOUS at 08:57

## 2020-10-23 RX ADMIN — PROPOFOL 50 MG: 10 INJECTION, EMULSION INTRAVENOUS at 09:10

## 2020-10-23 RX ADMIN — SODIUM CHLORIDE, SODIUM LACTATE, POTASSIUM CHLORIDE, AND CALCIUM CHLORIDE: 600; 310; 30; 20 INJECTION, SOLUTION INTRAVENOUS at 08:54

## 2020-10-23 NOTE — ANESTHESIA PREPROCEDURE EVALUATION
Relevant Problems   No relevant active problems       Anesthetic History   No history of anesthetic complications            Review of Systems / Medical History  Patient summary reviewed, nursing notes reviewed and pertinent labs reviewed    Pulmonary  Within defined limits                 Neuro/Psych   Within defined limits           Cardiovascular    Hypertension: well controlled          Hyperlipidemia    Exercise tolerance: >4 METS     GI/Hepatic/Renal     GERD           Endo/Other      Hypothyroidism  Morbid obesity and arthritis     Other Findings              Physical Exam    Airway  Mallampati: II  TM Distance: 4 - 6 cm  Neck ROM: normal range of motion   Mouth opening: Normal     Cardiovascular    Rhythm: regular  Rate: normal         Dental  No notable dental hx       Pulmonary  Breath sounds clear to auscultation               Abdominal         Other Findings            Anesthetic Plan    ASA: 3  Anesthesia type: MAC          Induction: Intravenous  Anesthetic plan and risks discussed with: Patient

## 2020-10-23 NOTE — PERIOP NOTES
Verified with Dr. Michaelle Xiao that OK to discharge with BP. Has taken 480 water PO and a liter of fluid. 1020 Progressed pt slowly and denied dizziness standing and transferring to car.

## 2020-10-23 NOTE — ANESTHESIA POSTPROCEDURE EVALUATION
Procedure(s):  COLONOSCOPY  ENDOSCOPIC POLYPECTOMY. MAC    Anesthesia Post Evaluation        Patient location during evaluation: PACU  Note status: Adequate. Level of consciousness: responsive to verbal stimuli and sleepy but conscious  Pain management: satisfactory to patient  Airway patency: patent  Anesthetic complications: no  Cardiovascular status: acceptable  Respiratory status: acceptable  Hydration status: acceptable  Comments: +Post-Anesthesia Evaluation and Assessment    Patient: Sky Powell MRN: [id number]  SSN: [id number]   YOB: 1955  Age: 76 y.o. Sex: male      Cardiovascular Function/Vital Signs    /67   Pulse 76   Temp 36.6 °C (97.8 °F)   Resp 16   Ht 5' 9\" (1.753 m)   Wt 124.7 kg (275 lb)   SpO2 96%   BMI 40.61 kg/m²     Patient is status post Procedure(s):  COLONOSCOPY  ENDOSCOPIC POLYPECTOMY. Nausea/Vomiting: Controlled. Postoperative hydration reviewed and adequate. Pain:  Pain Scale 1: Numeric (0 - 10) (10/23/20 0956)  Pain Intensity 1: 0 (10/23/20 0946)   Managed. Neurological Status:   Neuro (WDL): Within Defined Limits (10/23/20 0956)   At baseline. Mental Status and Level of Consciousness: Arousable. Pulmonary Status:   O2 Device: Room air (10/23/20 0956)   Adequate oxygenation and airway patent. Complications related to anesthesia: None    Post-anesthesia assessment completed. No concerns.     Signed By: Alexis Mcdonough MD    10/23/2020  Post anesthesia nausea and vomiting:  controlled      INITIAL Post-op Vital signs:   Vitals Value Taken Time   /67 10/23/2020  9:56 AM   Temp 36.6 °C (97.8 °F) 10/23/2020  9:31 AM   Pulse 76 10/23/2020  9:56 AM   Resp 16 10/23/2020  9:56 AM   SpO2 96 % 10/23/2020  9:56 AM

## 2020-10-23 NOTE — PROCEDURES
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 All questions were answered. Digital Rectal Exam:  was normal     The Olympus videocolonoscope was inserted in the rectum and advanced to the cecum. The cecum was identified by typical landmarks. The colonoscope was slowly and carefully withdrawn as the mucosa was inspected. I removed a 4mm descending polyp with cold snare. I removed an 8mm and a 9mm sigmoid polyps at 20 and 25 cm with hot snare. Left sided diverticula were present. No other abnormalities were noted. Retroflexion in the rectum was negative. Photos to document the ileocecal valve, appendiceal orifice and retroflexion exam were obtained. The preparation was adequate      Estimated Blood Loss:  none    Specimens:  Descending polyp  Sigmoid polyps    Findings: Three polyps   diverticula    Complications:  none    Implants:  none    Repeat colonoscopy is recommended in: Three years .                Micaela Perkins MD  9:28 AM  10/23/2020

## 2020-10-23 NOTE — PERIOP NOTES
Merari Roe  1955  [id number]    Situation:  Verbal report given from: RN and CORNELIO ALVAREZ  Procedure: Procedure(s):  COLONOSCOPY  ENDOSCOPIC POLYPECTOMY    Background:    Preoperative diagnosis: PERSONAL HX OF COLONIC POLYPS    Postoperative diagnosis: Diverticulosis, Sigmoid Colon Polyps, Descending Colon Polyp, Diverticulosis    :  Dr. Ruthie Rhoades    Assistant(s): Circ-1: Meche Mauricio RN  Circ-Relief: Gavino Veliz RN  Scrub Tech-1: Kayleigh Prado    Specimens:   ID Type Source Tests Collected by Time Destination   1 : Sigmoid Colon Polyps Preservative Sigmoid  Nat Love MD 10/23/2020 9589 Pathology   2 : Descending Colon Polyp Preservative Colon, Descending  Nat Love MD 10/23/2020 0913 Pathology       Assessment:  Intra-procedure medications   Propofol       Anesthesia gave intra-procedure sedation and medications, see anesthesia flow sheet     Intravenous fluids: LR@ KVO     Vital signs stable. Pt responsive. Heater placed for chill. Denies pain    Abdominal assessment: round and soft       Recommendation:    Permission to share finding with wife. yes    All side rails up, bed in low position, wheels locked. Nurse at bedside.

## 2020-10-23 NOTE — DISCHARGE INSTRUCTIONS
Cassandra Pacheco  [id number]  1955              Procedure  Discharge Instructions:      Discomfort:  Redness at IV site- apply warm compress to area; if redness or soreness persist- contact your physician  There may be a slight amount of blood passed from the rectum  Gaseous discomfort- walking, belching will help relieve any discomfort  You may not operate a vehicle for 12 hours  You may not engage in an occupation involving machinery or appliances for rest of today  You may not drink alcoholic beverages for at least 12 hours  Avoid making any critical decisions for at least 24 hour  DIET:   You may resume your normal diet today. You should not overeat or \"feast\" today as your abdomen may become distended or uncomfortable. MEDICATIONS:   I reconciled this list from the list you gave us when you came today for the procedure. Please clarify with me, your primary care physician and the nurse who is discharging you if we have any discrepancies. Aspirin and or non-steroidal medication (Ibuprofen, Motrin, naproxen, etc.) is ok in limited quantities. ACTIVITY:  You may resume your normal daily activities it is recommended that you spend the remainder of the day resting -  avoid any strenuous activity. CALL M.D. ANY SIGN OF:  Increasing pain, nausea, vomiting  Abdominal distension (swelling)  New increased bleeding (oral or rectal)  Fever (chills)  Pain in chest area  Bloody discharge from nose or mouth  Shortness of breath          Follow-up Instructions:   Call Dr. Aby Arevalo for the results of  biopsy in approximately one week  Telephone #  [phone]  Follow up visit as needed. Recall 3 years. Terrance Segovia MD  9:30 AM  10/23/2020     Anesthesia recommends a sleep study          DO NOT TAKE SLEEPING MEDICATIONS OR ANTIANXIETY MEDICATIONS WHILE TAKING NARCOTIC PAIN MEDICATIONS,  ESPECIALLY THE NIGHT OF ANESTHESIA.     CPAP PATIENTS BE SURE TO WEAR MACHINE WHENEVER NAPPING OR SLEEPING. DISCHARGE SUMMARY from Nurse    The following personal items collected during your admission are returned to you:   Dental Appliance: Dental Appliances: Uppers, At home  Vision: Visual Aid: Glasses  Hearing Aid:    Jewelry: Jewelry: None  Clothing: Clothing: With patient  Other Valuables: Other Valuables: Eyeglasses(pacu)  Valuables sent to safe:        PATIENT INSTRUCTIONS:    Anesthesia Discharge Instructions for Procedural Area requiring Sedation (MAC Anesthesia, Cath Lab, Endo and Radiology): You have been given medications during your procedure that may affect your memory and mental judgement for the next 24 hours. During this time frame for your safety, please follow the instructions listed below :    Have a responsible adult to drive you home and be with you for at least 12 hours.  Rest today and resume normal activities tomorrow.  Start with a soft bland diet and advance as tolerated to your recommended diet.  Do not drive any motor vehicle or operate mechanical or electrical equipment prior to [employer].  Avoid making critical decisions or signing legal documents prior to 6am tomorrow.  Do not drink alcohol prior to 6am tomorrow.  If you have sleep apnea and you plan to go home and take a nap, please use your CPAP machine not only at bedtime, but also while napping for 24 hours.  If you notice any redness or swelling on parts of your body where IV medications were given, place a warm wet washcloth over the area for 20 minutes at a time until the redness or swelling goes away. If you still have redness or swelling after 2-3 days, please call us. · You will receive a Post Operative Call from one of the Recovery Room Nurses on the day after your surgery to check on you. It is very important for us to know how you are recovering after your surgery.  If you have an issue or need to speak with someone, please call your surgeon, do not wait for the post operative call.    · You may receive an e-mail or letter in the mail from [facility] regarding your experience with us in the Ambulatory Surgery Unit. Your feedback is valuable to us and we appreciate your participation in the survey. · We wish you a speedy recovery ? What to do at Home:      *  Please give a list of your current medications to your Primary Care Provider. *  Please update this list whenever your medications are discontinued, doses are      changed, or new medications (including over-the-counter products) are added. *  Please carry medication information at all times in case of emergency situations. If you have not received your influenza and/or pneumococcal vaccine, please follow up with your primary care physician. The discharge information has been reviewed with the patient and caregiver. The patient and caregiver verbalized understanding.

## 2020-10-23 NOTE — H&P
Pre-endoscopy H and P    The patient was seen and examined in the [facility] pre op. The airway was assessed and docuemented. The problem list, past medical history, and medications were reviewed.      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     Social History     Socioeconomic History    Marital status:      Spouse name: Not on file    Number of children: Not on file    Years of education: Not on file    Highest education level: Not on file   Occupational History    Not on file   Social Needs    Financial resource strain: Not on file    Food insecurity     Worry: Not on file     Inability: Not on file    Transportation needs     Medical: Not on file     Non-medical: Not on file   Tobacco Use    Smoking status: Never Smoker    Smokeless tobacco: Never Used   Substance and Sexual Activity    Alcohol use: Yes     Comment: 0-3 DRINK PER MONTH    Drug use: No    Sexual activity: Not on file   Lifestyle    Physical activity     Days per week: Not on file     Minutes per session: Not on file    Stress: Not on file   Relationships    Social connections     Talks on phone: Not on file     Gets together: Not on file     Attends Holiness service: Not on file     Active member of club or organization: Not on file     Attends meetings of clubs or organizations: Not on file     Relationship status: Not on file    Intimate partner violence     Fear of current or ex partner: Not on file     Emotionally abused: Not on file     Physically abused: Not on file     Forced sexual activity: Not on file   Other Topics Concern    Not on file   Social History Narrative    Not on file     Past Medical History:   Diagnosis Date    Arthritis     At risk for sleep apnea 10/19/2020    during phone assessment with ALBA, ALLEN score 5    Cancer ([address]     MELANOMA RIGHT EAR    Chronic pain     KNEES, SHOULDERS    GERD (gastroesophageal reflux disease)     High cholesterol     Hypertension     Hypothyroid     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 Taking? MULTIVIT-MIN/FOLIC/VIT K/LYCOP (ONE-A-DAY MEN'S MULTIVITAMIN PO)   Yes Yes   Sig: Take 1 Tab by mouth daily. amLODIPine (NORVASC) 10 mg tablet 10/23/2020 at Unknown time  Yes Yes   Sig: Take 10 mg by mouth daily. aspirin delayed-release 81 mg tablet 10/22/2020 at Unknown time  Yes Yes   Sig: Take 81 mg by mouth daily. clobetasol (CLOBEX) 0.05 % lotion Not Taking at Unknown time  Yes No   Sig: Apply  to affected area as needed for Itching. levothyroxine (SYNTHROID) 100 mcg tablet 10/22/2020 at Unknown time  Yes Yes   Sig: Take 100 mcg by mouth Daily (before breakfast). losartan-hydroCHLOROthiazide (HYZAAR) 100-25 mg per tablet 10/23/2020 at Unknown time  Yes Yes   Sig: Take 1 Tab by mouth daily. pravastatin (PRAVACHOL) 20 mg tablet 10/22/2020 at Unknown time  Yes Yes   Sig: Take 20 mg by mouth nightly. triamcinolone acetonide (KENALOG) 0.5 % ointment Not Taking at Unknown time  Yes No   Sig: Apply  to affected area daily as needed for Skin Irritation. use thin layer      Facility-Administered Medications: None           The review of systems is:  negative for shortness of breath or chest pain      The heart, lungs, and mental status were satisfactory for the administration of anesthesia sedation and for the procedure. I discussed with the patient the objectives, risks, consequences and alternatives to the procedure. The patient was counseled at length about the risks of gonsalo Covid-19 during their perioperative period and any recovery window from their procedure.   The patient was made aware that gonsalo Covid-19  may worsen their prognosis for recovering from their procedure and lend to a higher morbidity and/or mortality risk. All material risks, benefits, and reasonable alternatives including postponing the procedure were discussed. The patient does  wish to proceed with the procedure at this time.         Justine Rivera MD  10/23/2020  8:46 AM

## 2021-04-29 ENCOUNTER — VIRTUAL VISIT (OUTPATIENT)
Dept: SLEEP MEDICINE | Age: 66
End: 2021-04-29
Payer: MEDICARE

## 2021-04-29 VITALS
DIASTOLIC BLOOD PRESSURE: 80 MMHG | HEIGHT: 69 IN | BODY MASS INDEX: 39.99 KG/M2 | TEMPERATURE: 97 F | SYSTOLIC BLOOD PRESSURE: 130 MMHG | WEIGHT: 270 LBS

## 2021-04-29 DIAGNOSIS — G47.33 OSA (OBSTRUCTIVE SLEEP APNEA): Primary | ICD-10-CM

## 2021-04-29 DIAGNOSIS — E07.9 THYROID DISORDER: ICD-10-CM

## 2021-04-29 DIAGNOSIS — I10 ESSENTIAL HYPERTENSION: ICD-10-CM

## 2021-04-29 PROCEDURE — G8427 DOCREV CUR MEDS BY ELIG CLIN: HCPCS | Performed by: INTERNAL MEDICINE

## 2021-04-29 PROCEDURE — G8536 NO DOC ELDER MAL SCRN: HCPCS | Performed by: INTERNAL MEDICINE

## 2021-04-29 PROCEDURE — G8752 SYS BP LESS 140: HCPCS | Performed by: INTERNAL MEDICINE

## 2021-04-29 PROCEDURE — G8754 DIAS BP LESS 90: HCPCS | Performed by: INTERNAL MEDICINE

## 2021-04-29 PROCEDURE — 3017F COLORECTAL CA SCREEN DOC REV: CPT | Performed by: INTERNAL MEDICINE

## 2021-04-29 PROCEDURE — 99203 OFFICE O/P NEW LOW 30 MIN: CPT | Performed by: INTERNAL MEDICINE

## 2021-04-29 PROCEDURE — G8417 CALC BMI ABV UP PARAM F/U: HCPCS | Performed by: INTERNAL MEDICINE

## 2021-04-29 PROCEDURE — 1101F PT FALLS ASSESS-DOCD LE1/YR: CPT | Performed by: INTERNAL MEDICINE

## 2021-04-29 PROCEDURE — G8432 DEP SCR NOT DOC, RNG: HCPCS | Performed by: INTERNAL MEDICINE

## 2021-04-29 RX ORDER — GLUCOSAMINE SULFATE 1500 MG
1000 POWDER IN PACKET (EA) ORAL DAILY
COMMUNITY

## 2021-04-29 NOTE — PATIENT INSTRUCTIONS
[address] [place]. 1668 [facility], [address] Tel.  [phone] Fax. 100 [facility] [address] [place], [address] [facility] Tel.  [phone] Fax. [phone] [address] [place] Kymberly Hughes  Tel.  [phone] Fax. [phone] Sleep Apnea: After Your Visit Your Care Instructions Sleep apnea occurs when you frequently stop breathing for 10 seconds or longer during sleep. It can be mild to severe, based on the number of times per hour that you stop breathing or have slowed breathing. Blocked or narrowed airways in your nose, mouth, or throat can cause sleep apnea. Your airway can become blocked when your throat muscles and tongue relax during sleep. Sleep apnea is common, occurring in 1 out of 20 individuals. Individuals having any of the following characteristics should be evaluated and treated right away due to high risk and detrimental consequences from untreated sleep apnea: 
1. Obesity 2. Congestive Heart failure 3. Atrial Fibrillation 4. Uncontrolled Hypertension 5. Type II Diabetes 6. Night-time Arrhythmias 7. Stroke 8. Pulmonary Hypertension 9. High-risk Driving Populations (pilots, truck drivers, etc.) 10. Patients Considering Weight-loss Surgery How do you know you have sleep apnea? You probably have sleep apnea if you answer 'yes' to 3 or more of the following questions: S - Have you been told that you Snore? T - Are you often Tired during the day? O - Has anyone Observed you stop breathing while sleeping? P- Do you have (or are being treated for) high blood Pressure? B - Are you obese (Body Mass Index > 35)? A - Is your Age 48years old or older? N - Is your Neck size greater than 16 inches? G - Are you male Gender? A sleep physician can prescribe a breathing device that prevents tissues in the throat from blocking your airway.  Or your doctor may recommend using a dental device (oral breathing device) to help keep your airway open. In some cases, surgery may be needed to remove enlarged tissues in the throat. Follow-up care is a key part of your treatment and safety. Be sure to make and go to all appointments, and call your doctor if you are having problems. It's also a good idea to know your test results and keep a list of the medicines you take. How can you care for yourself at home? · Lose weight, if needed. It may reduce the number of times you stop breathing or have slowed breathing. · Go to bed at the same time every night. · Sleep on your side. It may stop mild apnea. If you tend to roll onto your back, sew a pocket in the back of your pajama top. Put a tennis ball into the pocket, and stitch the pocket shut. This will help keep you from sleeping on your back. · Avoid alcohol and medicines such as sleeping pills and sedatives before bed. · Do not smoke. Smoking can make sleep apnea worse. If you need help quitting, talk to your doctor about stop-smoking programs and medicines. These can increase your chances of quitting for good. · Prop up the head of your bed 4 to 6 inches by putting bricks under the legs of the bed. · Treat breathing problems, such as a stuffy nose, caused by a cold or allergies. · Use a continuous positive airway pressure (CPAP) breathing machine if lifestyle changes do not help your apnea and your doctor recommends it. The machine keeps your airway from closing when you sleep. · If CPAP does not help you, ask your doctor whether you should try other breathing machines. A bilevel positive airway pressure machine has two types of air pressureâone for breathing in and one for breathing out. Another device raises or lowers air pressure as needed while you breathe. · If your nose feels dry or bleeds when using one of these machines, talk with your doctor about increasing moisture in the air. A humidifier may help.  
· If your nose is runny or stuffy from using a breathing machine, talk with your doctor about using decongestants or a corticosteroid nasal spray. When should you call for help? Watch closely for changes in your health, and be sure to contact your doctor if: 
· You still have sleep apnea even though you have made lifestyle changes. · You are thinking of trying a device such as CPAP. · You are having problems using a CPAP or similar machine. Where can you learn more? Go to [employer]. Enter O144 in the search box to learn more about \"Sleep Apnea: After Your Visit. \"  
© 5885-6071 Healthwise, Incorporated. Care instructions adapted under license by [address] [place] [address] [employer] (which disclaims liability or warranty for this information). This care instruction is for use with your licensed healthcare professional. If you have questions about a medical condition or this instruction, always ask your healthcare professional. Jose Carlos Ross any warranty or liability for your use of this information. PROPER SLEEP HYGIENE What to avoid · Do not have drinks with caffeine, such as coffee or black tea, for 8 hours before bed. · Do not smoke or use other types of tobacco near bedtime. Nicotine is a stimulant and can keep you awake. · Avoid drinking alcohol late in the evening, because it can cause you to wake in the middle of the night. · Do not eat a big meal close to bedtime. If you are hungry, eat a light snack. · Do not drink a lot of water close to bedtime, because the need to urinate may wake you up during the night. · Do not read or watch TV in bed. Use the bed only for sleeping and sexual activity. What to try · Go to bed at the same time every night, and wake up at the same time every morning. Do not take naps during the day. · Keep your bedroom quiet, dark, and cool. · Get regular exercise, but not within 3 to 4 hours of your bedtime. Soo Mariee · Sleep on a comfortable pillow and mattress.  
· If watching the clock makes you anxious, turn it facing away from you so you cannot see the time. · If you worry when you lie down, start a worry book. Well before bedtime, write down your worries, and then set the book and your concerns aside. · Try meditation or other relaxation techniques before you go to bed. · If you cannot fall asleep, get up and go to another room until you feel sleepy. Do something relaxing. Repeat your bedtime routine before you go to bed again. · Make your house quiet and calm about an hour before bedtime. Turn down the lights, turn off the TV, log off the computer, and turn down the volume on music. This can help you relax after a busy day. Drowsy Driving The [place] [address] cites drowsiness as a causing factor in more than 001,122 police reported crashes annually, resulting in 76,000 injuries and 1,500 deaths. Other surveys suggest 55% of people polled have driven while drowsy in the past year, 23% had fallen asleep but not crashed, 3% crashed, and 2% had and accident due to drowsy driving. Who is at risk? Young Drivers: One study of drowsy driving accidents states that 55% of the drivers were under 25 years. Of those, 75% were male. Shift Workers and Travelers: People who work overnight or travel across time zones frequently are at higher risk of experiencing Circadian Rhythm Disorders. They are trying to work and function when their body is programed to sleep. Sleep Deprived: Lack of sleep has a serious impact on your ability to pay attention or focus on a task. Consistently getting less than the average of 8 hours your body needs creates partial or cumulative sleep deprivation. Untreated Sleep Disorders: Sleep Apnea, Narcolepsy, R.L.S., and other sleep disorders (untreated) prevent a person from getting enough restful sleep. This leads to excessive daytime sleepiness and increases the risk for drowsy driving accidents by up to 7 times.  
Medications / Alcohol: Even over the counter medications can cause drowsiness. Medications that impair a drivers attention should have a warning label. Alcohol naturally makes you sleepy and on its own can cause accidents. Combined with excessive drowsiness its effects are amplified. Signs of Drowsy Driving: * You don't remember driving the last few miles * You may drift out of your nicola * You are unable to focus and your thoughts wander * You may yawn more often than normal 
 * You have difficulty keeping your eyes open / nodding off * Missing traffic signs, speeding, or tailgating Prevention-  
Good sleep hygiene, lifestyle and behavioral choices have the most impact on drowsy driving. There is no substitute for sleep and the average person requires 8 hours nightly. If you find yourself driving drowsy, stop and sleep. Consider the sleep hygiene tips provided during your visit as well. Medication Refill Policy: Refills for all medications require 1 week advance notice. Please have your pharmacy fax a refill request. We are unable to fax, or call in \"controled substance\" medications and you will need to pick these prescriptions up from our office. Sonico Activation Thank you for requesting access to Sonico. Please follow the instructions below to securely access and download your online medical record. Sonico allows you to send messages to your doctor, view your test results, renew your prescriptions, schedule appointments, and more. How Do I Sign Up? 1. In your internet browser, go to [email or web address]. 2. Click on the First Time User? Click Here link in the Sign In box. You will see the New Member Sign Up page. 3. Enter your Sonico Access Code exactly as it appears below. You will not need to use this code after youve completed the sign-up process. If you do not sign up before the expiration date, you must request a new code. Sonico Access Code: [email or web address] Expires: 5/21/2021  2:51 PM (This is the date your makeena access code will ) 4. Enter the last four digits of your Social Security Number (xxxx) and Date of Birth (mm/dd/yyyy) as indicated and click Submit. You will be taken to the next sign-up page. 5. Create a Droidhent ID. This will be your makeena login ID and cannot be changed, so think of one that is secure and easy to remember. 6. Create a makeena password. You can change your password at any time. 7. Enter your Password Reset Question and Answer. This can be used at a later time if you forget your password. 8. Enter your e-mail address. You will receive e-mail notification when new information is available in [address] 9. Click Sign Up. You can now view and download portions of your medical record. 10. Click the Download Summary menu link to download a portable copy of your medical information. Additional Information If you have questions, please call [phone]. Remember, makeena is NOT to be used for urgent needs. For medical emergencies, dial 911.

## 2021-04-29 NOTE — PROGRESS NOTES
[address] [place]. [place], [address]  Tel.  [phone]  Fax. 100 [facility] 60  [place], [address] [facility]  Tel.  [phone]  Fax. [phone] 9250 [facility] Kymberly Garcia [address]  Tel.  [phone]  Fax. [phone]       Anneliese Farrell is a 76y.o. year old male seen for evaluation of a sleep disorder. ASSESSMENT/PLAN:      ICD-10-CM ICD-9-CM    1. ALLEN (obstructive sleep apnea)  G47.33 327.23 POLYSOMNOGRAPHY 1 NIGHT   2. Essential hypertension  I10 401.9    3. Thyroid disorder  E07.9 246.9    4. BMI 39.0-39.9,adult  Z68.39 V85.39        Patient has a history and examination consistent with the diagnosis of sleep apnea. Follow-up and Dispositions    · Return for telephone follow-up after testing is completed. * The patient currently has a High Risk for having sleep apnea. STOP-BANG score 6.    * Sleep testing was ordered for initial evaluation. Orders Placed This Encounter    POLYSOMNOGRAPHY 1 NIGHT     Standing Status:   Future     Standing Expiration Date:   7/29/2021     Order Specific Question:   Reason for Exam     Answer:   ALLEN       * He was provided information on sleep apnea including corresponding risk factors and the importance of proper treatment. * Treatment options were reviewed in detail. he would like to proceed with PAP therapy. Patient will be seen in follow-up in 6-8 weeks after PAP setup to gauge treatment response and adherence to therapy. * The patient was counseled regarding proper sleep hygiene, with emphasis on ensuring sufficient total sleep time; safe driving and the benefits of exercise and weight loss. * All of his questions were addressed. 2. Recommended a dedicated weight loss program through appropriate diet and exercise regimen as significant weight reduction has been shown to reduce severity of obstructive sleep apnea.      SUBJECTIVE/OBJECTIVE:    Anneliese Farrell is an 76 y.o. male referred for evaluation for a sleep disorder. He complains of snoring associated with daytime tiredness. Symptoms began several years ago, gradually worsening since that time. He usually can fall asleep in 15-30 minutes. Family or house members note snoring. He denies of symptoms indicative of cataplexy, sleep paralysis or sleep related hallucinations. He denies of a history of unusual movements occurring during sleep. Marjorie Brooks does wake up frequently at night. He is bothered by waking up too early and left unable to get back to sleep. He actually sleeps about 7 hours at night and wakes up about 3 times during the night. He does not work shifts: Emy COOPER indicates he does not get too little sleep at night. His bedtime is 2330. He awakens at 0730. He does take naps. He takes 1 naps a week lasting 15 to 30. He has the following observed behaviors: Loud snoring, Light snoring, Grinding teeth, Kicking with legs; Nightmares. Other remarks: vivid dreams    The patient has not undergone diagnostic testing for the current problems. He has tried an HSAT in November 2020 but was not successful. Review of Systems:  Constitutional:  No significant weight loss or weight gain  Eyes:  No blurred vision  CVS:  No significant chest pain  Pulm:  No significant shortness of breath  GI:  No significant nausea or vomiting  :  No significant nocturia  Musculoskeletal:  No significant joint pain at night  Skin:  No significant rashes  Neuro:  No significant dizziness   Psych:  No active mood issues    Sleep Review of Systems: notable for Negative difficulty falling asleep; Positive awakenings at night; Positive perceived regular dreaming; Positive nightmares; Negative  early morning headaches; Negative  memory problems; Negative  concentration issues; Negative caffeine;  Negative alcohol;   Negative history of any automobile or occupational accidents due to daytime drowsiness.       [place] Sleepiness Score: 7 and Modified F.O.S.Q. Score Total / 2: 17    Patient-Reported Vitals 4/29/2021   Patient-Reported Weight 270lb   Patient-Reported Temperature 97.0   Patient-Reported Systolic  962   Patient-Reported Diastolic 80       Physical Exam completed by visual and auditory observation of patient with verbal input from patient. General:   Alert, oriented, not in acute distress   Eyes:  Anicteric Sclerae; no obvious strabismus   Nose:  No obvious nasal septum deviation    Neck:   Midline trachea, no visible mass   Chest/Lungs:  Respiratory effort normal, no visualized signs of difficulty breathing or respiratory distress   CVS:  No JVD   Extremities:  No obvious rashes noted on face, neck, or hands   Neuro:  No facial asymmetry, no focal deficits; no obvious tremor    Psych:  Normal affect,  normal countenance     Boo Horvath is being evaluated by a Virtual Visit (video visit) encounter to address concerns as mentioned above. A caregiver was present when appropriate. Due to this being a TeleHealth encounter (During [place] [address] public [facility] emergency), evaluation of the following organ systems was limited: Vitals/Constitutional/EENT/Resp/CV/GI//MS/Neuro/Skin/Heme-Lymph-Imm. Pursuant to the emergency declaration under the [address] [place] [address] and the [employer] and [employer] Act, this Virtual Visit was conducted with patient's (and/or legal guardian's) consent, to reduce the patient's risk of exposure to COVID-19 and provide necessary medical care. Patient identification was verified at the start of the visit: YES using name and date of birth. Patient's phone number [phone] (cell)  was confirmed for accuracy. He gives permission for messages regarding results and appointments to be left at that number. Services were provided through a video synchronous discussion virtually to substitute for in-person clinic visit.   I was in the office while conducting this encounter, patient located at their home or alternate location of their choice. An electronic signature was used to authenticate this note. Sable Lombard, MD, FAASM  Diplomate American Board of Sleep Medicine  Diplomate in Sleep Medicine - ABP    Electronically signed.  04/29/21

## 2021-05-04 ENCOUNTER — HOSPITAL ENCOUNTER (OUTPATIENT)
Dept: SLEEP MEDICINE | Age: 66
Discharge: HOME | End: 2021-05-04
Payer: MEDICARE

## 2021-05-04 VITALS
OXYGEN SATURATION: 95 % | TEMPERATURE: 97.8 F | BODY MASS INDEX: 39.99 KG/M2 | DIASTOLIC BLOOD PRESSURE: 78 MMHG | WEIGHT: 270 LBS | HEART RATE: 86 BPM | SYSTOLIC BLOOD PRESSURE: 146 MMHG | HEIGHT: 69 IN

## 2021-05-04 DIAGNOSIS — G47.33 OSA (OBSTRUCTIVE SLEEP APNEA): ICD-10-CM

## 2021-05-04 PROCEDURE — 95810 POLYSOM 6/> YRS 4/> PARAM: CPT | Performed by: INTERNAL MEDICINE

## 2021-05-05 ENCOUNTER — DOCUMENTATION ONLY (OUTPATIENT)
Dept: SLEEP MEDICINE | Age: 66
End: 2021-05-05

## 2021-05-05 NOTE — PROGRESS NOTES
[address] [place]. [place], [address]  Tel.  [phone]  Fax. 100 [facility] 60  [place], [address] [facility]  Tel.  [phone]  Fax. [phone] [address] [facility] Kymberly Garcia   Tel.  [phone]  Fax. [phone]     Sleep Study Technical Notes        PRE-Test:  Reyes Miner (: 1955) arrived in the lobby. Patient was greeted, temperature checked (97.8 °) and screening questions asked. The patient was taken to the Sleep Center and taken directly to his/her room. BP (146/78) and SaO2 (95) were taken. Weight per patient (270). Procedure explained to the patient and questions were answered. The patient expressed understanding of the procedure. Electrodes were applied without incident. The patient was placed in bed and the study was started. Acquisition Notes:   Lights off: 9:28 PM    Respiratory events: Hypopneas   ECG:  NSR   PAP titration: none   Other comments: n/a   Desensitization Mask(s) Used: n/a    POST Test:   Patient was awakened. Electrodes were removed. The patient was discharged after answering the Post Questionnaire. Patient stated that he/she was alert and ok to drive.  Equipment and room cleaned per infection control policy.

## 2021-05-19 ENCOUNTER — TELEPHONE (OUTPATIENT)
Dept: SLEEP MEDICINE | Age: 66
End: 2021-05-19

## 2021-05-19 DIAGNOSIS — G47.33 OSA (OBSTRUCTIVE SLEEP APNEA): Primary | ICD-10-CM

## 2021-05-19 DIAGNOSIS — Z11.52 ENCOUNTER FOR SCREENING FOR COVID-19: ICD-10-CM

## 2021-05-20 NOTE — TELEPHONE ENCOUNTER
Jenny Swanson is to be contacted by lead sleep technologist regarding results of Sleep Testing which was indicative of an average AHI of 25.5 per hour with an SpO2 dimitri of 81%, the duration of SpO2 <88% was 4.4 minutes. * A second polysomnogram has been ordered for mask fitting, PAP desensitizing protocol, and pressure titration. * Follow-up appointment will be scheduled 8-12 weeks following PAP initiation to gauge treatment response and compliance. Encounter Diagnoses   Name Primary?  ALLEN (obstructive sleep apnea) Yes    Encounter for screening for COVID-19        Orders Placed This Encounter    NOVEL CORONAVIRUS (COVID-19)     Standing Status:   Future     Number of Occurrences:   1     Standing Expiration Date:   8/19/2021     Scheduling Instructions:      1) Due to current limited availability of the COVID-19 PCR test, tests will be prioritized and may not be completed.              2) Order only if the test result will change clinical management or necessary for a return to mission-critical employment decision.              3) Print and instruct patient to adhere to [facility] home isolation program. (Link Above)              4) Set up or refer patient for a monitoring program.              5) Have patient sign up for and leverage [employer] (if not previously done).      Order Specific Question:   Status     Answer:   Asymptomatic/Surveillance(e.g. pre-op/pre-procedure/pre-delivery/transfer)     Order Specific Question:   Reason for Test     Answer:   Upcoming elective surgery/procedure/delivery, return to work, or discharge to another facility    SLEEP LAB (PAP TITRATION)     Standing Status:   Future     Standing Expiration Date:   8/19/2021     Order Specific Question:   Reason for Exam     Answer:   ALLEN

## 2021-05-24 ENCOUNTER — DOCUMENTATION ONLY (OUTPATIENT)
Dept: SLEEP MEDICINE | Age: 66
End: 2021-05-24

## 2021-06-25 ENCOUNTER — OFFICE VISIT (OUTPATIENT)
Dept: SLEEP MEDICINE | Age: 66
End: 2021-06-25

## 2021-06-25 DIAGNOSIS — G47.33 OSA (OBSTRUCTIVE SLEEP APNEA): Primary | ICD-10-CM

## 2021-06-26 LAB
SARS-COV-2, NAA 2 DAY TAT: NORMAL
SARS-COV-2, NAA: NOT DETECTED

## 2021-06-29 ENCOUNTER — HOSPITAL ENCOUNTER (OUTPATIENT)
Dept: SLEEP MEDICINE | Age: 66
Discharge: HOME | End: 2021-06-29
Attending: INTERNAL MEDICINE
Payer: MEDICARE

## 2021-06-29 VITALS
HEART RATE: 81 BPM | TEMPERATURE: 97.8 F | WEIGHT: 270 LBS | BODY MASS INDEX: 39.99 KG/M2 | OXYGEN SATURATION: 96 % | SYSTOLIC BLOOD PRESSURE: 128 MMHG | HEIGHT: 69 IN | DIASTOLIC BLOOD PRESSURE: 78 MMHG

## 2021-06-29 DIAGNOSIS — G47.33 OSA (OBSTRUCTIVE SLEEP APNEA): ICD-10-CM

## 2021-06-29 PROCEDURE — 95811 POLYSOM 6/>YRS CPAP 4/> PARM: CPT | Performed by: INTERNAL MEDICINE

## 2021-06-30 NOTE — PROGRESS NOTES
[address] [place]. [place], [address]  Tel.  [phone]  Fax. 100 [facility] 60  [place], [address] [facility]  Tel.  [phone]  Fax. [phone] 9250 [facility] Kymberly Garcia   Tel.  [phone]  Fax. [phone]     Sleep Study Technical Notes        PRE-Test:  Perla Bonilla (: 1955) arrived in the lobby. Patient was greeted, temperature checked (97.8) and screening questions asked. The patient was taken to the Sleep Center and taken directly to his/her room. BP (128/78) and SaO2 (96%) were taken. Weight per patient (270 lbs). Procedure explained to the patient and questions were answered. The patient expressed understanding of the procedure. Electrodes were applied without incident. The patient was placed in bed and the study was started.  PAP mask acclimation for 40 mins. Acquisition Notes:   Lights off: 9:42 pm    Respiratory events: Reras, hypopneas and central apnea X1.   ECG:  Normal sinus rhythm   PAP titration: CPAP 5.0CM TO 10. 0CM   Desensitization Mask(s) Used: RESMED AIR FIT N30I MEDIUM AND RESMED AIR FIT F30 MEDIUM   Other comments: PATIENT HAD A DIFFICULT TIME SLEEPING IN THE LAB  o Patient had caregiver in attendance:  NO  o Patient watched TV or on phone after lights out:  NO  o Patient slept with positional therapy:  NO  o Patient slept with head of bed elevated:  NO ELEVATED ON 3 PILLOWS   o Patient wore an oral appliance:  NO  o Patient to bathroom 1 time. o Pediatric Patient:  NA  - Parent accompanied patient:  NO  - Parent slept in bed with patient:  NO      POST Test:   Patient was awakened. Electrodes were removed. The patient was discharged after answering the Post Questionnaire. Patient stated that he/she was alert and ok to drive.  Equipment and room cleaned per infection control policy.

## 2021-07-18 ENCOUNTER — TELEPHONE (OUTPATIENT)
Dept: SLEEP MEDICINE | Age: 66
End: 2021-07-18

## 2021-07-18 DIAGNOSIS — G47.33 OSA (OBSTRUCTIVE SLEEP APNEA): Primary | ICD-10-CM

## 2021-07-18 NOTE — TELEPHONE ENCOUNTER
Aristides Ohara is to be contacted by lead sleep technologist regarding results of Sleep Testing which was indicative of CPAP Failure due to persistent apnea and fragmented sleep on CPAP Therapy. * Patient should be offered a repeat titration which has been ordered for mask fitting, Bi-Level PAP desensitizing protocol, and Bi-Level pressure titration. * Follow-up appointment will be scheduled 8-12 weeks following PAP initiation to gauge treatment response and compliance. Encounter Diagnosis   Name Primary?  ALLEN (obstructive sleep apnea) Yes       Orders Placed This Encounter    SLEEP LAB (PAP TITRATION)     Standing Status:   Future     Standing Expiration Date:   10/18/2021     Scheduling Instructions:      Perform Bi-level Titration.      Order Specific Question:   Reason for Exam     Answer:   ALLEN

## 2021-07-20 NOTE — TELEPHONE ENCOUNTER
ROSANNEM on 07/20/2021 at 9:00am for patient to call the office to schedule Covid test and Titration sleep study.

## 2021-08-11 ENCOUNTER — HOSPITAL ENCOUNTER (OUTPATIENT)
Dept: SLEEP MEDICINE | Age: 66
Discharge: HOME | End: 2021-08-11
Payer: MEDICARE

## 2021-08-11 VITALS
HEIGHT: 69 IN | SYSTOLIC BLOOD PRESSURE: 140 MMHG | HEART RATE: 90 BPM | OXYGEN SATURATION: 95 % | WEIGHT: 270 LBS | BODY MASS INDEX: 39.99 KG/M2 | TEMPERATURE: 99.3 F | DIASTOLIC BLOOD PRESSURE: 82 MMHG

## 2021-08-11 DIAGNOSIS — G47.33 OSA (OBSTRUCTIVE SLEEP APNEA): ICD-10-CM

## 2021-08-11 PROCEDURE — 95811 POLYSOM 6/>YRS CPAP 4/> PARM: CPT | Performed by: INTERNAL MEDICINE

## 2021-08-12 ENCOUNTER — DOCUMENTATION ONLY (OUTPATIENT)
Dept: SLEEP MEDICINE | Age: 66
End: 2021-08-12

## 2021-08-12 NOTE — PROGRESS NOTES
[address] [facility] [place]. [place], [address]  Tel.  [phone]  Fax. [address]  [address] [facility] [address], [address]  Tel.  [phone]  Fax. [phone] [address]   Tel.  [phone]  Fax. [phone]     Sleep Study Technical Notes        PRE-Test:  Aleshia Guerra (: 1955) arrived in the lobby. Patient was greeted, temperature checked (99.3) and screening questions asked. The patient was  taken directly to his room. BP (140/82) and SaO2 (95) were taken. Weight per patient (270). Procedure explained to the patient and questions were answered. The patient expressed understanding of the procedure. Electrodes were applied without incident. The patient was placed in bed and the study was started. Acquisition Notes:   Lights off: 10:25pm    Respiratory events: none   ECG:  NSR   PAP titration: BiPap 12/6   Desensitization Mask(s) Used: F&P Vitera FFM size large   Other comments: Patient did not sleep much after lights off  o Patient to bathroom 2 times    POST Test:   Patient was awakened. Electrodes were removed. The patient was discharged after answering the Post Questionnaire. Patient stated that he was alert and ok to drive.  Equipment and room cleaned per infection control policy.

## 2021-08-27 ENCOUNTER — TELEPHONE (OUTPATIENT)
Dept: SLEEP MEDICINE | Age: 66
End: 2021-08-27

## 2021-08-27 NOTE — TELEPHONE ENCOUNTER
Sleep test interpreted, audio visit to be scheduled to review results and discuss treatment options.

## 2021-09-02 ENCOUNTER — OFFICE VISIT (OUTPATIENT)
Dept: SLEEP MEDICINE | Age: 66
End: 2021-09-02
Payer: MEDICARE

## 2021-09-02 VITALS — HEIGHT: 69 IN | BODY MASS INDEX: 39.99 KG/M2 | WEIGHT: 270 LBS

## 2021-09-02 DIAGNOSIS — G47.33 OSA (OBSTRUCTIVE SLEEP APNEA): Primary | ICD-10-CM

## 2021-09-02 PROCEDURE — 99443 PR PHYS/QHP TELEPHONE EVALUATION 21-30 MIN: CPT | Performed by: INTERNAL MEDICINE

## 2021-09-02 NOTE — PROGRESS NOTES
217 [facility] [place]. [place], [address]  Tel.  [phone]  Fax. [address] [facility] [address]  [place], [address] [facility]  Tel.  [phone]  Fax. [phone] [address]  Tel.  [phone]  Fax. [address] [facility] [address] (: 1955) is a 76 y.o. male, established patient, seen for evaluation of sleep disorder and further management. Lashae Whitley was last seen by me on 21, prior notes reviewed in detail. Polysomnogram (PSG) performed on 21 showed an AHI of 25.5/hr with a lowest SpO2 of 81%, duration of SpO2 < 88% 4.4 minutes. Respiratory disturbance was predominantly positional in nature. He return for a CPAP and Bi-Level titration and was noted to have worsening sleep quality. He states that Bi-Level PAP therapy was more comfortable than CPAP. ASSESSMENT/PLAN:    ICD-10-CM ICD-9-CM    1. ALLEN (obstructive sleep apnea)  G47.33 327.23 SLEEP STUDY UNATTENDED, 4 CHANNEL   2. BMI 39.0-39.9,adult  Z68.39 V85.39          1. Sleep Apnea -   * Treatment options reviewed included:     - Positional Therapy using Slumber BUMP + Repeat HSAT      - OAT (reviewed) + Positional Therapy using Slumber BUMP     - PAP using a Bi-Level device + use of a mild hypnotic short term    * Patient would like to proceed with Positional Therapy. Orders Placed This Encounter    SLEEP STUDY UNATTENDED, 4 CHANNEL     Order Specific Question:   Reason for Exam     Answer:   Assess effect of Postional Therapy       2. Recommended a dedicated weight loss program through appropriate diet and exercise regimen as significant weight reduction has been shown to reduce severity of obstructive sleep apnea. * Counseling was provided regarding the importance of ensuring sufficient total sleep time, proper sleep hygiene, and safe driving. *  He was asked to contact our office for any problems regarding sleep symptoms.     Follow-up and Dispositions    · Return for with sleep physician in 3 months. SUBJECTIVE/OBJECTIVE:    He  is seen today for follow up on his sleep testing perform due to complaints of snoring and poor quality nocturnal sleep at new patient visit on 04-29-21. Polysomnogram (PSG) performed on 05-04-21 showed an AHI of 25.5/hr with a lowest SpO2 of 81%, duration of SpO2 < 88% 4.4 minutes. Respiratory disturbance was predominantly positional in nature. He return for a CPAP and Bi-Level titration and was noted to have worsening sleep quality. He states that Bi-Level PAP therapy was more comfortable than CPAP. Vitals reported by patient     Patient-Reported Vitals 4/29/2021   Patient-Reported Weight 270lb   Patient-Reported Temperature 97.0   Patient-Reported Systolic  426   Patient-Reported Diastolic 80      Physical Exam not completed due to audio only visit. Pursuant to the emergency declaration under the [address] [place] [address], [address] [place] [address] and the [employer] Act, this telephone encounter was conducted with patient's (and/or legal guardian's) consent, to reduce the risk of exposure to COVID-19 and provide necessary medical care. Services were provided through a telephone call to substitute for in-person encounter. I was in the office while conducting this encounter, patient located at their home or alternate location of their choice. Patient identification was verified at the start of the visit: YES using name and date of birth. Patient's phone number [phone] (home)  was confirmed for accuracy. He gives permission for messages regarding results and appointments to be left at that number.     On this date 09/02/21 I have spent 28 minutes reviewing previous notes, test results, and on an audio only visit with the patient discussing the diagnosis and importance of compliance with the treatment plan as well as documenting on the day of the visit. An electronic signature was used to authenticate this note. Emre Meade MD, FAASM  Diplomate American Board of Sleep Medicine  Diplomate in Sleep Medicine - ABP    Electronically signed.  09/02/21

## 2021-09-02 NOTE — PATIENT INSTRUCTIONS
217 [facility]., [place]. [place], [address]  Tel.  [phone]  Fax. 100 [facility] 60  [place], [address] [facility]  Tel.  [phone]  Fax. [phone] [address]  Tel.  [phone]  Fax. [phone]     Sleep Apnea: After Your Visit  Your Care Instructions  Sleep apnea occurs when you frequently stop breathing for 10 seconds or longer during sleep. It can be mild to severe, based on the number of times per hour that you stop breathing or have slowed breathing. Blocked or narrowed airways in your nose, mouth, or throat can cause sleep apnea. Your airway can become blocked when your throat muscles and tongue relax during sleep. Sleep apnea is common, occurring in 1 out of 20 individuals. Individuals having any of the following characteristics should be evaluated and treated right away due to high risk and detrimental consequences from untreated sleep apnea:  1. Obesity  2. Congestive Heart failure  3. Atrial Fibrillation  4. Uncontrolled Hypertension  5. Type II Diabetes  6. Night-time Arrhythmias  7. Stroke  8. Pulmonary Hypertension  9. High-risk Driving Populations (pilots, truck drivers, etc.)  10. Patients Considering Weight-loss Surgery    How do you know you have sleep apnea? You probably have sleep apnea if you answer 'yes' to 3 or more of the following questions:  S - Have you been told that you Snore? T - Are you often Tired during the day? O - Has anyone Observed you stop breathing while sleeping? P- Do you have (or are being treated for) high blood Pressure? B - Are you obese (Body Mass Index > 35)? A - Is your Age 48years old or older? N - Is your Neck size greater than 16 inches? G - Are you male Gender? A sleep physician can prescribe a breathing device that prevents tissues in the throat from blocking your airway.  Or your doctor may recommend using a dental device (oral breathing device) to help keep your airway open. In some cases, surgery may be needed to remove enlarged tissues in the throat. Follow-up care is a key part of your treatment and safety. Be sure to make and go to all appointments, and call your doctor if you are having problems. It's also a good idea to know your test results and keep a list of the medicines you take. How can you care for yourself at home? · Lose weight, if needed. It may reduce the number of times you stop breathing or have slowed breathing. · Go to bed at the same time every night. · Sleep on your side. It may stop mild apnea. If you tend to roll onto your back, sew a pocket in the back of your pajama top. Put a tennis ball into the pocket, and stitch the pocket shut. This will help keep you from sleeping on your back. · Avoid alcohol and medicines such as sleeping pills and sedatives before bed. · Do not smoke. Smoking can make sleep apnea worse. If you need help quitting, talk to your doctor about stop-smoking programs and medicines. These can increase your chances of quitting for good. · Prop up the head of your bed 4 to 6 inches by putting bricks under the legs of the bed. · Treat breathing problems, such as a stuffy nose, caused by a cold or allergies. · Use a continuous positive airway pressure (CPAP) breathing machine if lifestyle changes do not help your apnea and your doctor recommends it. The machine keeps your airway from closing when you sleep. · If CPAP does not help you, ask your doctor whether you should try other breathing machines. A bilevel positive airway pressure machine has two types of air pressureâone for breathing in and one for breathing out. Another device raises or lowers air pressure as needed while you breathe. · If your nose feels dry or bleeds when using one of these machines, talk with your doctor about increasing moisture in the air. A humidifier may help.   · If your nose is runny or stuffy from using a breathing machine, talk with your doctor about using decongestants or a corticosteroid nasal spray. When should you call for help? Watch closely for changes in your health, and be sure to contact your doctor if:  · You still have sleep apnea even though you have made lifestyle changes. · You are thinking of trying a device such as CPAP. · You are having problems using a CPAP or similar machine. Where can you learn more? Go to [employer]. Enter T241 in the search box to learn more about \"Sleep Apnea: After Your Visit. \"   © 6757-6593 Healthwise, Incorporated. Care instructions adapted under license by [facility] [employer] (which disclaims liability or warranty for this information). This care instruction is for use with your licensed healthcare professional. If you have questions about a medical condition or this instruction, always ask your healthcare professional. Clemetine Mayuri any warranty or liability for your use of this information. PROPER SLEEP HYGIENE    What to avoid  · Do not have drinks with caffeine, such as coffee or black tea, for 8 hours before bed. · Do not smoke or use other types of tobacco near bedtime. Nicotine is a stimulant and can keep you awake. · Avoid drinking alcohol late in the evening, because it can cause you to wake in the middle of the night. · Do not eat a big meal close to bedtime. If you are hungry, eat a light snack. · Do not drink a lot of water close to bedtime, because the need to urinate may wake you up during the night. · Do not read or watch TV in bed. Use the bed only for sleeping and sexual activity. What to try  · Go to bed at the same time every night, and wake up at the same time every morning. Do not take naps during the day. · Keep your bedroom quiet, dark, and cool. · Get regular exercise, but not within 3 to 4 hours of your bedtime. .  · Sleep on a comfortable pillow and mattress.   · If watching the clock makes you anxious, turn it facing away from you so you cannot see the time. · If you worry when you lie down, start a worry book. Well before bedtime, write down your worries, and then set the book and your concerns aside. · Try meditation or other relaxation techniques before you go to bed. · If you cannot fall asleep, get up and go to another room until you feel sleepy. Do something relaxing. Repeat your bedtime routine before you go to bed again. · Make your house quiet and calm about an hour before bedtime. Turn down the lights, turn off the TV, log off the computer, and turn down the volume on music. This can help you relax after a busy day. Drowsy Driving  The [address] [place] [address] Road Traffic Safety Administration cites drowsiness as a causing factor in more than 360,954 police reported crashes annually, resulting in 76,000 injuries and 1,500 deaths. Other surveys suggest 55% of people polled have driven while drowsy in the past year, 23% had fallen asleep but not crashed, 3% crashed, and 2% had and accident due to drowsy driving. Who is at risk? Young Drivers: One study of drowsy driving accidents states that 55% of the drivers were under 25 years. Of those, 75% were male. Shift Workers and Travelers: People who work overnight or travel across time zones frequently are at higher risk of experiencing Circadian Rhythm Disorders. They are trying to work and function when their body is programed to sleep. Sleep Deprived: Lack of sleep has a serious impact on your ability to pay attention or focus on a task. Consistently getting less than the average of 8 hours your body needs creates partial or cumulative sleep deprivation. Untreated Sleep Disorders: Sleep Apnea, Narcolepsy, R.L.S., and other sleep disorders (untreated) prevent a person from getting enough restful sleep. This leads to excessive daytime sleepiness and increases the risk for drowsy driving accidents by up to 7 times.   Medications / Alcohol: Even over the counter medications can cause drowsiness. Medications that impair a drivers attention should have a warning label. Alcohol naturally makes you sleepy and on its own can cause accidents. Combined with excessive drowsiness its effects are amplified. Signs of Drowsy Driving:   * You don't remember driving the last few miles   * You may drift out of your nicola   * You are unable to focus and your thoughts wander   * You may yawn more often than normal   * You have difficulty keeping your eyes open / nodding off   * Missing traffic signs, speeding, or tailgating  Prevention-   Good sleep hygiene, lifestyle and behavioral choices have the most impact on drowsy driving. There is no substitute for sleep and the average person requires 8 hours nightly. If you find yourself driving drowsy, stop and sleep. Consider the sleep hygiene tips provided during your visit as well. Medication Refill Policy: Refills for all medications require 1 week advance notice. Please have your pharmacy fax a refill request. We are unable to fax, or call in \"controled substance\" medications and you will need to pick these prescriptions up from our office. Popset Activation    Thank you for requesting access to Popset. Please follow the instructions below to securely access and download your online medical record. Popset allows you to send messages to your doctor, view your test results, renew your prescriptions, schedule appointments, and more. How Do I Sign Up? 1. In your internet browser, go to [email or web address]. 2. Click on the First Time User? Click Here link in the Sign In box. You will see the New Member Sign Up page. 3. Enter your Popset Access Code exactly as it appears below. You will not need to use this code after youve completed the sign-up process. If you do not sign up before the expiration date, you must request a new code.     Popset Access Code: [email or web address]  Expires: 9/3/2021 11:31 AM (This is the date your O-film access code will )    4. Enter the last four digits of your Social Security Number (xxxx) and Date of Birth (mm/dd/yyyy) as indicated and click Submit. You will be taken to the next sign-up page. 5. Create a Swiftpaget ID. This will be your O-film login ID and cannot be changed, so think of one that is secure and easy to remember. 6. Create a O-film password. You can change your password at any time. 7. Enter your Password Reset Question and Answer. This can be used at a later time if you forget your password. 8. Enter your e-mail address. You will receive e-mail notification when new information is available in [address] 9. Click Sign Up. You can now view and download portions of your medical record. 10. Click the Download Summary menu link to download a portable copy of your medical information. Additional Information    If you have questions, please call [phone]. Remember, O-film is NOT to be used for urgent needs. For medical emergencies, dial 911.

## 2021-09-21 ENCOUNTER — OFFICE VISIT (OUTPATIENT)
Dept: SLEEP MEDICINE | Age: 66
End: 2021-09-21

## 2021-09-21 DIAGNOSIS — G47.33 OSA (OBSTRUCTIVE SLEEP APNEA): Primary | ICD-10-CM

## 2021-09-21 NOTE — PROGRESS NOTES
S>Josh Xiao is a 76 y.o. male seen today to receive a home sleep testing unit (HST). · Patient was educated on proper hookup and operation of the HST via detailed instruction sheet (per COVID-19 precautions)  · Instruction forms with after hours contact and documentation were signed. O>    There were no vitals taken for this visit. A>  No diagnosis found. P>  · General information regarding operations and maintenance of the device was provided. · Follow-up appointment was made to return the HST. He will be contacted once the results have been reviewed. · He was asked to contact our office for any problems regarding his home sleep test study.

## 2021-09-22 ENCOUNTER — OFFICE VISIT (OUTPATIENT)
Dept: SLEEP MEDICINE | Age: 66
End: 2021-09-22

## 2021-09-22 ENCOUNTER — HOSPITAL ENCOUNTER (OUTPATIENT)
Dept: SLEEP MEDICINE | Age: 66
Discharge: HOME | End: 2021-09-22
Payer: MEDICARE

## 2021-09-22 DIAGNOSIS — G47.33 OSA (OBSTRUCTIVE SLEEP APNEA): Primary | ICD-10-CM

## 2021-09-22 PROCEDURE — 95806 SLEEP STUDY UNATT&RESP EFFT: CPT | Performed by: INTERNAL MEDICINE

## 2021-09-22 NOTE — PROGRESS NOTES
Patient returned HSAT device in box with device and belt intact. Questionnaire completed. Patient advised results would be available within 7-10 business days. Had pulse ox issues.

## 2021-09-22 NOTE — PROGRESS NOTES
S>Josh Cosby is a 76 y.o. male seen today to receive a home sleep testing unit (HST). · Patient was educated on proper hookup and operation of the HST via detailed instruction sheet (per COVID-19 precautions)  · Instruction forms with after hours contact and documentation were signed. O>    There were no vitals taken for this visit. A>  No diagnosis found. P>  · General information regarding operations and maintenance of the device was provided. · Follow-up appointment was made to return the HST. He will be contacted once the results have been reviewed. · He was asked to contact our office for any problems regarding his home sleep test study.

## 2021-09-23 ENCOUNTER — OFFICE VISIT (OUTPATIENT)
Dept: SLEEP MEDICINE | Age: 66
End: 2021-09-23

## 2021-09-23 DIAGNOSIS — G47.33 OSA (OBSTRUCTIVE SLEEP APNEA): Primary | ICD-10-CM

## 2021-09-27 ENCOUNTER — TELEPHONE (OUTPATIENT)
Dept: SLEEP MEDICINE | Age: 66
End: 2021-09-27

## 2021-09-27 DIAGNOSIS — G47.33 OSA (OBSTRUCTIVE SLEEP APNEA): Primary | ICD-10-CM

## 2021-09-28 NOTE — TELEPHONE ENCOUNTER
Harris Hebert is to be contacted by lead sleep technologist regarding results of Sleep Testing which was indicative of an average AHI of 11 per hour with an SpO2 dimitri of 76% and SpO2 of < 88% being 9 minutes. There has been a > 50% reduction in AHI with positional therapy with AHI decreasing form 25 to 11 per hour. Continue Positional Therapy using Slumber BUMP + Repeat HSAT in 3-6 months. Orders Placed This Encounter    SLEEP STUDY UNATTENDED, 4 CHANNEL     Order Specific Question:   Reason for Exam     Answer:   ALLEN         If patient has any further questions he should schedule a visit to discuss.

## 2021-10-07 NOTE — TELEPHONE ENCOUNTER
Reviewed sleep study results with patient. He expressed understanding and is willing to continue with  Positional Therapy using Aylin BUMP + Repeat HSAT in 3-6 months. Milagros Salazar

## 2022-01-31 DIAGNOSIS — M17.12 PRIMARY OSTEOARTHRITIS OF LEFT KNEE: Primary | ICD-10-CM

## 2022-01-31 RX ORDER — CLINDAMYCIN HYDROCHLORIDE 300 MG/1
300 CAPSULE ORAL 2 TIMES DAILY
Qty: 4 CAPSULE | Refills: 4 | Status: SHIPPED | OUTPATIENT
Start: 2022-01-31 | End: 2022-07-08

## 2022-03-18 PROBLEM — D37.4 NEOPLASM OF UNCERTAIN BEHAVIOR OF LARGE INTESTINE: Status: ACTIVE | Noted: 2020-10-23

## 2022-03-18 PROBLEM — M19.011 OSTEOARTHRITIS OF RIGHT SHOULDER: Status: ACTIVE | Noted: 2020-06-25

## 2022-03-19 PROBLEM — M17.12 OSTEOARTHRITIS OF LEFT KNEE: Status: ACTIVE | Noted: 2018-04-23

## 2022-04-13 ENCOUNTER — OFFICE VISIT (OUTPATIENT)
Dept: SLEEP MEDICINE | Age: 67
End: 2022-04-13
Payer: MEDICARE

## 2022-04-13 VITALS — WEIGHT: 270 LBS | BODY MASS INDEX: 39.87 KG/M2

## 2022-04-13 DIAGNOSIS — G47.33 OSA (OBSTRUCTIVE SLEEP APNEA): Primary | ICD-10-CM

## 2022-04-13 PROCEDURE — 99442 PR PHYS/QHP TELEPHONE EVALUATION 11-20 MIN: CPT | Performed by: INTERNAL MEDICINE

## 2022-04-13 NOTE — PATIENT INSTRUCTIONS
[address] [facility] [place]. [place], [address]  Tel.  [phone]  Fax. 100 [facility] 60  [place], [address] [facility]  Tel.  [phone]  Fax. [phone] [address]  Tel.  [phone]  Fax. [phone]     Sleep Apnea: After Your Visit  Your Care Instructions  Sleep apnea occurs when you frequently stop breathing for 10 seconds or longer during sleep. It can be mild to severe, based on the number of times per hour that you stop breathing or have slowed breathing. Blocked or narrowed airways in your nose, mouth, or throat can cause sleep apnea. Your airway can become blocked when your throat muscles and tongue relax during sleep. Sleep apnea is common, occurring in 1 out of 20 individuals. Individuals having any of the following characteristics should be evaluated and treated right away due to high risk and detrimental consequences from untreated sleep apnea:  1. Obesity  2. Congestive Heart failure  3. Atrial Fibrillation  4. Uncontrolled Hypertension  5. Type II Diabetes  6. Night-time Arrhythmias  7. Stroke  8. Pulmonary Hypertension  9. High-risk Driving Populations (pilots, truck drivers, etc.)  10. Patients Considering Weight-loss Surgery    How do you know you have sleep apnea? You probably have sleep apnea if you answer 'yes' to 3 or more of the following questions:  S - Have you been told that you Snore? T - Are you often Tired during the day? O - Has anyone Observed you stop breathing while sleeping? P- Do you have (or are being treated for) high blood Pressure? B - Are you obese (Body Mass Index > 35)? A - Is your Age 48years old or older? N - Is your Neck size greater than 16 inches? G - Are you male Gender? A sleep physician can prescribe a breathing device that prevents tissues in the throat from blocking your airway.  Or your doctor may recommend using a dental device (oral breathing device) to help keep your airway open. In some cases, surgery may be needed to remove enlarged tissues in the throat. Follow-up care is a key part of your treatment and safety. Be sure to make and go to all appointments, and call your doctor if you are having problems. It's also a good idea to know your test results and keep a list of the medicines you take. How can you care for yourself at home? · Lose weight, if needed. It may reduce the number of times you stop breathing or have slowed breathing. · Go to bed at the same time every night. · Sleep on your side. It may stop mild apnea. If you tend to roll onto your back, sew a pocket in the back of your pajama top. Put a tennis ball into the pocket, and stitch the pocket shut. This will help keep you from sleeping on your back. · Avoid alcohol and medicines such as sleeping pills and sedatives before bed. · Do not smoke. Smoking can make sleep apnea worse. If you need help quitting, talk to your doctor about stop-smoking programs and medicines. These can increase your chances of quitting for good. · Prop up the head of your bed 4 to 6 inches by putting bricks under the legs of the bed. · Treat breathing problems, such as a stuffy nose, caused by a cold or allergies. · Use a continuous positive airway pressure (CPAP) breathing machine if lifestyle changes do not help your apnea and your doctor recommends it. The machine keeps your airway from closing when you sleep. · If CPAP does not help you, ask your doctor whether you should try other breathing machines. A bilevel positive airway pressure machine has two types of air pressureâone for breathing in and one for breathing out. Another device raises or lowers air pressure as needed while you breathe. · If your nose feels dry or bleeds when using one of these machines, talk with your doctor about increasing moisture in the air. A humidifier may help.   · If your nose is runny or stuffy from using a breathing machine, talk with your doctor about using decongestants or a corticosteroid nasal spray. When should you call for help? Watch closely for changes in your health, and be sure to contact your doctor if:  · You still have sleep apnea even though you have made lifestyle changes. · You are thinking of trying a device such as CPAP. · You are having problems using a CPAP or similar machine. Where can you learn more? Go to [id number]. Enter Z712 in the search box to learn more about \"Sleep Apnea: After Your Visit. \"   © 6970-1623 Healthwise, [employer]orporated. Care instructions adapted under license by [employer] (which disclaims liability or warranty for this information). This care instruction is for use with your licensed healthcare professional. If you have questions about a medical condition or this instruction, always ask your healthcare professional. Mery Quintana any warranty or liability for your use of this information. PROPER SLEEP HYGIENE    What to avoid  · Do not have drinks with caffeine, such as coffee or black tea, for 8 hours before bed. · Do not smoke or use other types of tobacco near bedtime. Nicotine is a stimulant and can keep you awake. · Avoid drinking alcohol late in the evening, because it can cause you to wake in the middle of the night. · Do not eat a big meal close to bedtime. If you are hungry, eat a light snack. · Do not drink a lot of water close to bedtime, because the need to urinate may wake you up during the night. · Do not read or watch TV in bed. Use the bed only for sleeping and sexual activity. What to try  · Go to bed at the same time every night, and wake up at the same time every morning. Do not take naps during the day. · Keep your bedroom quiet, dark, and cool. · Get regular exercise, but not within 3 to 4 hours of your bedtime. .  · Sleep on a comfortable pillow and mattress.   · If watching the clock makes you anxious, turn it facing away from you so you cannot see the time. · If you worry when you lie down, start a worry book. Well before bedtime, write down your worries, and then set the book and your concerns aside. · Try meditation or other relaxation techniques before you go to bed. · If you cannot fall asleep, get up and go to another room until you feel sleepy. Do something relaxing. Repeat your bedtime routine before you go to bed again. · Make your house quiet and calm about an hour before bedtime. Turn down the lights, turn off the TV, log off the computer, and turn down the volume on music. This can help you relax after a busy day. Drowsy Driving  The [address] [place] [address] Road Traffic Safety Administration cites drowsiness as a causing factor in more than 689,360 police reported crashes annually, resulting in 76,000 injuries and 1,500 deaths. Other surveys suggest 55% of people polled have driven while drowsy in the past year, 23% had fallen asleep but not crashed, 3% crashed, and 2% had and accident due to drowsy driving. Who is at risk? Young Drivers: One study of drowsy driving accidents states that 55% of the drivers were under 25 years. Of those, 75% were male. Shift Workers and Travelers: People who work overnight or travel across time zones frequently are at higher risk of experiencing Circadian Rhythm Disorders. They are trying to work and function when their body is programed to sleep. Sleep Deprived: Lack of sleep has a serious impact on your ability to pay attention or focus on a task. Consistently getting less than the average of 8 hours your body needs creates partial or cumulative sleep deprivation. Untreated Sleep Disorders: Sleep Apnea, Narcolepsy, R.L.S., and other sleep disorders (untreated) prevent a person from getting enough restful sleep. This leads to excessive daytime sleepiness and increases the risk for drowsy driving accidents by up to 7 times.   Medications / Alcohol: Even over the counter medications can cause drowsiness. Medications that impair a drivers attention should have a warning label. Alcohol naturally makes you sleepy and on its own can cause accidents. Combined with excessive drowsiness its effects are amplified. Signs of Drowsy Driving:   * You don't remember driving the last few miles   * You may drift out of your nicola   * You are unable to focus and your thoughts wander   * You may yawn more often than normal   * You have difficulty keeping your eyes open / nodding off   * Missing traffic signs, speeding, or tailgating  Prevention-   Good sleep hygiene, lifestyle and behavioral choices have the most impact on drowsy driving. There is no substitute for sleep and the average person requires 8 hours nightly. If you find yourself driving drowsy, stop and sleep. Consider the sleep hygiene tips provided during your visit as well. Medication Refill Policy: Refills for all medications require 1 week advance notice. Please have your pharmacy fax a refill request. We are unable to fax, or call in \"controled substance\" medications and you will need to pick these prescriptions up from our office. [employer] Activation    Thank you for requesting access to [employer]. Please follow the instructions below to securely access and download your online medical record. [employer] allows you to send messages to your doctor, view your test results, renew your prescriptions, schedule appointments, and more. How Do I Sign Up? 1. In your internet browser, go to [email or web address]. 2. Click on the First Time User? Click Here link in the Sign In box. You will see the New Member Sign Up page. 3. Enter your [employer] Access Code exactly as it appears below. You will not need to use this code after youve completed the sign-up process. If you do not sign up before the expiration date, you must request a new code.     [employer] Access Code: [email or web address]  Expires: 5/15/2022 10:09 AM (This is the date your [employer] access code will )    4. Enter the last four digits of your Social Security Number (xxxx) and Date of Birth (mm/dd/yyyy) as indicated and click Submit. You will be taken to the next sign-up page. 5. Create a [employer]terPointt ID. This will be your [employer] login ID and cannot be changed, so think of one that is secure and easy to remember. 6. Create a [employer] password. You can change your password at any time. 7. Enter your Password Reset Question and Answer. This can be used at a later time if you forget your password. 8. Enter your e-mail address. You will receive e-mail notification when new information is available in [address] 9. Click Sign Up. You can now view and download portions of your medical record. 10. Click the Download Summary menu link to download a portable copy of your medical information. Additional Information    If you have questions, please call [phone]. Remember, [employer] is NOT to be used for urgent needs. For medical emergencies, dial 911.

## 2022-04-13 NOTE — PROGRESS NOTES
[address] [facility] [address] [place]. [place], [address]  Tel.  [phone]  Fax. 100 [facility] 60  [place], [address] [facility]  Tel.  [phone]  Fax. [phone] [address]  Tel.  [phone]  Fax. [phone]       Laila Gill is a 78y.o. year old male seen for evaluation of a sleep disorder. ASSESSMENT/PLAN:      ICD-10-CM ICD-9-CM    1. ALLEN (obstructive sleep apnea)  G47.33 327.23 SLEEP STUDY UNATTENDED, 4 CHANNEL       * Sleep testing was ordered for evaluation of positional therapy. Orders Placed This Encounter    SLEEP STUDY UNATTENDED, 4 CHANNEL     Order Specific Question:   Reason for Exam     Answer:   ALLEN       * He was provided information on sleep apnea including corresponding risk factors and the importance of proper treatment. * Treatment options were reviewed in detail. He would like to proceed with PAP therapy. Patient will be seen in follow-up in 6-8 weeks after PAP setup to gauge treatment response and adherence to therapy. * The patient was counseled regarding proper sleep hygiene, with emphasis on ensuring sufficient total sleep time; safe driving and the benefits of exercise and weight loss. * All of his questions were addressed. 2. Recommended a dedicated weight loss program through appropriate diet and exercise regimen as significant weight reduction has been shown to reduce severity of obstructive sleep apnea. SUBJECTIVE/OBJECTIVE:    Laila Gill is an 78 y.o. male referred for evaluation for a sleep disorder. He states his snoring has improved, he still wakes up for no specific reason or to urinate. He reports of feeling refreshed on waking about half the time. He usually can fall asleep in 5 - 60 minutes. Family or house members note improvement in snoring. He reports of having a bad left shoulder and if he sleeps too long on that shoulder it starts to hurt.     The patient has undergone diagnostic testing for the current problems. Polysomnogram (PSG) performed on 05-04-21 showed an AHI of 25.5/hr with a lowest SpO2 of 81%, duration of SpO2 < 88% 4.4 minutes. Respiratory disturbance was predominantly positional in nature. He return for a CPAP and Bi-Level titration and was noted to have worsening sleep quality. He opted for positional therapy and is sleeping on his side using a Slumber BUMP. HSAT performed on 09-22-21 indicated an average AHI of 11 per hour with an SpO2 dimitri of 76% and SpO2 of < 88% being 9 minutes. [place] Sleepiness Score: 12   and Modified F.O.S.Q. Score Total / 2: 16.5        Physical Exam not completed due to audio only visit. Pursuant to the emergency declaration under the [address] [place] [address], [address] [place] [address] and the [employer] Act, this telephone encounter was conducted with patient's (and/or legal guardian's) consent, to reduce the risk of exposure to COVID-19 and provide necessary medical care. Services were provided through a telephone call to substitute for in-person encounter. I was in the office while conducting this encounter, patient located at their home or alternate location of their choice. Patient identification was verified at the start of the visit: YES using name and date of birth. Patient's phone number [phone] (home)  was confirmed for accuracy. He gives permission for messages regarding results and appointments to be left at that number. On this date 04/13/22 I have spent 12 minutes reviewing previous notes, test results, and on an audio only visit with the patient discussing the diagnosis and importance of compliance with the treatment plan as well as documenting on the day of the visit. An electronic signature was used to authenticate this note.     Mike Rankin MD, FAASM  Diplomate American Board of Sleep Medicine  Diplomate in Sleep Medicine - ABP    Electronically signed.  04/13/22

## 2022-04-14 ENCOUNTER — DOCUMENTATION ONLY (OUTPATIENT)
Dept: SLEEP MEDICINE | Age: 67
End: 2022-04-14

## 2022-05-10 ENCOUNTER — OFFICE VISIT (OUTPATIENT)
Dept: ORTHOPEDIC SURGERY | Age: 67
End: 2022-05-10
Payer: MEDICARE

## 2022-05-10 DIAGNOSIS — M25.512 ACUTE PAIN OF LEFT SHOULDER: Primary | ICD-10-CM

## 2022-05-10 DIAGNOSIS — M19.012 PRIMARY OSTEOARTHRITIS OF LEFT SHOULDER: ICD-10-CM

## 2022-05-10 PROCEDURE — 1101F PT FALLS ASSESS-DOCD LE1/YR: CPT | Performed by: ORTHOPAEDIC SURGERY

## 2022-05-10 PROCEDURE — G8432 DEP SCR NOT DOC, RNG: HCPCS | Performed by: ORTHOPAEDIC SURGERY

## 2022-05-10 PROCEDURE — G8417 CALC BMI ABV UP PARAM F/U: HCPCS | Performed by: ORTHOPAEDIC SURGERY

## 2022-05-10 PROCEDURE — 3017F COLORECTAL CA SCREEN DOC REV: CPT | Performed by: ORTHOPAEDIC SURGERY

## 2022-05-10 PROCEDURE — 99214 OFFICE O/P EST MOD 30 MIN: CPT | Performed by: ORTHOPAEDIC SURGERY

## 2022-05-10 PROCEDURE — G8427 DOCREV CUR MEDS BY ELIG CLIN: HCPCS | Performed by: ORTHOPAEDIC SURGERY

## 2022-05-10 PROCEDURE — G8536 NO DOC ELDER MAL SCRN: HCPCS | Performed by: ORTHOPAEDIC SURGERY

## 2022-05-10 NOTE — LETTER
5/10/2022    Patient: Renetta Neil   YOB: 1955   Date of Visit: 5/10/2022     Chary Baeza MD  [address] [facility] [address]  Via Fax: [phone]    Dear Chary Baeza MD,      Thank you for referring Mr. Renetta Neil to [facility] for evaluation. My notes for this consultation are attached. If you have questions, please do not hesitate to call me. I look forward to following your patient along with you.       Sincerely,    Lindell Dandy, MD

## 2022-05-10 NOTE — PROGRESS NOTES
Boone Leonardo (: 1955) is a 78 y.o. male, patient, here for evaluation of the following chief complaint(s):  Shoulder Pain (left shoulder pain)       HPI:    Patient comes in today for evaluation of his left shoulder pain. He has known history of bilateral shoulder pain and arthritis. He underwent a right total shoulder arthroplasty in 2020. He states his right shoulder is doing well. Unfortunately his left shoulder continues to give him issues. He has noticed decreases in his range of motion and his pain has become more frequent. The pain does wake him from sleep. Does not have any numbness or tingling in the left upper extremity. He has had no new injuries. He was initially planning to have his left shoulder replaced last summer however due to [place] and some other medical issues he had to hold off. Since that time he has been managing his pain through activity modifications, taking ibuprofen and Tylenol. He has attempted injections and formal physical therapy in the past as well but with modest relief. He is interested in discussing further medical/surgical options for his left shoulder at this point. He is right-hand dominant. Allergies   Allergen Reactions    Pcn [Penicillins] Rash       Current Outpatient Medications   Medication Sig    clindamycin (CLEOCIN) 300 mg capsule Take 1 Capsule by mouth two (2) times a day. Patient is to take two po one hour before dental procedure  Patient is to take two po one hour after dental procedure    cholecalciferol (VITAMIN D3) 25 mcg (1,000 unit) cap Take 1,000 Units by mouth daily.  losartan-hydroCHLOROthiazide (HYZAAR) 100-25 mg per tablet Take 1 Tab by mouth daily.  amLODIPine (NORVASC) 10 mg tablet Take 10 mg by mouth daily.  triamcinolone acetonide (KENALOG) 0.5 % ointment Apply  to affected area daily as needed for Skin Irritation. use thin layer    aspirin delayed-release 81 mg tablet Take 81 mg by mouth daily.     levothyroxine (SYNTHROID) 100 mcg tablet Take 100 mcg by mouth Daily (before breakfast).  pravastatin (PRAVACHOL) 20 mg tablet Take 20 mg by mouth nightly.  MULTIVIT-MIN/FOLIC/VIT K/LYCOP (ONE-A-DAY MEN'S MULTIVITAMIN PO) Take 1 Tab by mouth daily.  clobetasol (CLOBEX) 0.05 % lotion Apply  to affected area as needed for Itching. No current facility-administered medications for this visit. 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  MYELOMA    Diabetes Maternal Grandmother     Cancer Paternal Grandfather         LUNG CA    Anesth Problems Neg Hx     Asthma Neg Hx     Heart Disease Neg Hx         Social History     Socioeconomic History    Marital status:      Spouse name: Not on file    Number of children: Not on file    Years of education: Not on file    Highest education level: Not on file   Occupational History    Not on file   Tobacco Use    Smoking status: Never Smoker    Smokeless tobacco: Never Used   Substance and Sexual Activity    Alcohol use: Not Currently    Drug use: No    Sexual activity: Not on file   Other Topics Concern     Service Not Asked    Blood Transfusions Not Asked    Caffeine Concern Not Asked    Occupational Exposure Not Asked    Hobby Hazards Not Asked    Sleep Concern Not Asked    Stress Concern Not Asked    Weight Concern Not Asked    Special Diet Not Asked    Back Care Not Asked    Exercise Not Asked    Bike Helmet Not Asked   [address] Not Asked    Self-Exams Not Asked   Social History Narrative    Not on file     Social Determinants of Health     Financial Resource Strain:     Difficulty of Paying Living Expenses: Not on file   Food Insecurity:     Worried About Running Out of Food in the Last Year: Not on file    [place] of Food in the Last Year: Not on file   Transportation Needs:     Lack of Transportation (Medical): Not on file    Lack of Transportation (Non-Medical):  Not on file   Physical Activity:     Days of Exercise per Week: Not on file    Minutes of Exercise per Session: Not on file   Stress:     Feeling of Stress : Not on file   Social Connections:     Frequency of Communication with Friends and Family: Not on file    Frequency of Social Gatherings with Friends and Family: Not on file    Attends Zoroastrian Services: Not on file    Active Member of Clubs or Organizations: Not on file    Attends Club or Organization Meetings: Not on file    Marital Status: Not on file   Intimate Partner Violence:     Fear of Current or Ex-Partner: Not on file    Emotionally Abused: Not on file    Physically Abused: Not on file    Sexually Abused: Not on file   Housing Stability:     Unable to Pay for Housing in the Last Year: Not on file    Number of [place] in the Last Year: Not on file    Unstable Housing in the Last Year: Not on file       Review of Systems   Musculoskeletal:        Left shoulder pain Vitals: There were no vitals taken for this visit. There is no height or weight on file to calculate BMI. Ortho Exam     General:  Well-nourished well-developed male. Alert and oriented. No acute distress. Pleasant on exam.  Normal affect and mood. Left upper extremity:  Skin is intact about the shoulder. No atrophy or deformity noted. Minimal tenderness over the [facility] joint. Nontender palpation of the clavicle, acromion, spine scapula, long head of the bicep, biceps muscle, or triceps muscle. He is also nontender distally over the elbow, wrist, forearm, hand or fingers. Active range of motion is limited in all planes-forward flexion 60 degrees, abduction 60 to 70 degrees, external rotation 30 degrees, and internal rotation to L5-S1. Passively able to forward flex to about 70 degrees. There is palpable crepitus with active passive range of motion of the shoulder. Noted to have good strength with resisted internal and external rotation of the shoulder with his arm at his side. Good strength and minimal discomfort with resisted forward flexion and abduction. Sensation is intact to light touch over the lateral shoulder. Motor and sensory intact distally in all distributions. Hand is warm well perfused. Right shoulder: Skin is healed. Patient has full range of motion of the right shoulder. Patient has minimal pain with manipulation of the shoulder. Rotator cuff strength is healed. Neurovascular examination is intact. XR Results (most recent):  Results from Appointment encounter on 05/10/22    XR SHOULDER LT AP/LAT MIN 2 V    Narrative  Three-view x-ray of the left shoulder reveals end-stage osteoarthritis of left shoulder with perimeter osteophytic changes. Bone-on-bone arthritis is noted       Radiology:  3 views of the left shoulder is negative for any fractures or dislocations.   Significant degenerative change of the glenohumeral joint with bone-on-bone arthritis, osteophytes off the inferior glenoid and inferior aspect of the humeral head. The humeral head is still well centered without obvious superior translation. ASSESSMENT/PLAN:    43-year-old male with left shoulder pain and osteoarthritis    Today we discussed his history, physical exam, radiographical findings. He has end-stage degenerative changes at the glenohumeral joint. He has been dealing with his left shoulder pain for many years and has attempted nonoperative treatment. He has already undergone a right total shoulder arthroplasty in June 2020 and is well aware of the risks, benefits, and rehab involved. His left shoulder pain is significant and daily. It is affecting his sleep. It is also making it difficult for him to enjoy his daily activities. He would like to schedule for a shoulder replacement. He understands he will need to get a CT scan for navigation. The risks and benefits as well as the importance of this test were discussed with him today and all questions were answered. He is also going to talk to his primary care physician and make sure that he has medical clearance. He will schedule at his convenience we will plan to see him on the day of surgery. In the interim he can continue taking his over-the-counter NSAIDs and Tylenol. He can use ice. Cautioned him against any heavy lifting but he can do whatever activities he can tolerate at this point. He is encouraged to call or come back sooner with any other questions or concerns I have refreshed his memory in regards to. Surgical risks and benefits. The risks and benefits were described to the patient. Patient understands there is a risk of infection, postoperative pain, numbness, tingling, stiffness MI, DVT, PE, and other unforeseen events. The patient also understands there is a long rehabilitative process that typically follows the surgical procedure. We talked about the possibility of not being able to alleviate all of the discomfort. Also, I explained  there is no guarantee all the function and strength will return. The patient also understands the risks of re-tear or failure to heal.  The patient understands implants may be utilized during this surgery. The patient also understands the generalized, associated risk of anesthetic and wishes to proceed in an elective fashion.       Kristen Brennan MD

## 2022-05-12 DIAGNOSIS — M25.512 ACUTE PAIN OF LEFT SHOULDER: Primary | ICD-10-CM

## 2022-05-18 DIAGNOSIS — M25.512 ACUTE PAIN OF LEFT SHOULDER: Primary | ICD-10-CM

## 2022-05-26 ENCOUNTER — HOSPITAL ENCOUNTER (OUTPATIENT)
Dept: CT IMAGING | Age: 67
Discharge: HOME | End: 2022-05-26
Attending: ORTHOPAEDIC SURGERY
Payer: MEDICARE

## 2022-05-26 DIAGNOSIS — M25.512 ACUTE PAIN OF LEFT SHOULDER: ICD-10-CM

## 2022-05-26 PROCEDURE — 73200 CT UPPER EXTREMITY W/O DYE: CPT

## 2022-06-06 ENCOUNTER — DOCUMENTATION ONLY (OUTPATIENT)
Dept: ORTHOPEDIC SURGERY | Age: 67
End: 2022-06-06

## 2022-06-30 ENCOUNTER — HOSPITAL ENCOUNTER (OUTPATIENT)
Dept: PREADMISSION TESTING | Age: 67
Discharge: HOME | End: 2022-06-30
Payer: MEDICARE

## 2022-06-30 VITALS
WEIGHT: 280.43 LBS | DIASTOLIC BLOOD PRESSURE: 83 MMHG | BODY MASS INDEX: 41.53 KG/M2 | HEIGHT: 69 IN | HEART RATE: 73 BPM | SYSTOLIC BLOOD PRESSURE: 144 MMHG | TEMPERATURE: 98.1 F | RESPIRATION RATE: 18 BRPM

## 2022-06-30 LAB
ABO + RH BLD: NORMAL
ANION GAP SERPL CALC-SCNC: 7 MMOL/L (ref 5–15)
BLOOD GROUP ANTIBODIES SERPL: NORMAL
BUN SERPL-MCNC: 17 MG/DL (ref 6–20)
BUN/CREAT SERPL: 17 (ref 12–20)
CALCIUM SERPL-MCNC: 9.3 MG/DL (ref 8.5–10.1)
CHLORIDE SERPL-SCNC: 102 MMOL/L (ref 97–108)
CO2 SERPL-SCNC: 29 MMOL/L (ref 21–32)
CREAT SERPL-MCNC: 1.01 MG/DL (ref 0.7–1.3)
ERYTHROCYTE [DISTWIDTH] IN BLOOD BY AUTOMATED COUNT: 12.8 % (ref 11.5–14.5)
EST. AVERAGE GLUCOSE BLD GHB EST-MCNC: 111 MG/DL
GLUCOSE SERPL-MCNC: 94 MG/DL (ref 65–100)
HBA1C MFR BLD: 5.5 % (ref 4–5.6)
HCT VFR BLD AUTO: 46.5 % (ref 36.6–50.3)
HGB BLD-MCNC: 15.4 G/DL (ref 12.1–17)
MCH RBC QN AUTO: 29.8 PG (ref 26–34)
MCHC RBC AUTO-ENTMCNC: 33.1 G/DL (ref 30–36.5)
MCV RBC AUTO: 90.1 FL (ref 80–99)
NRBC # BLD: 0 K/UL (ref 0–0.01)
NRBC BLD-RTO: 0 PER 100 WBC
PLATELET # BLD AUTO: 289 K/UL (ref 150–400)
PMV BLD AUTO: 9.2 FL (ref 8.9–12.9)
POTASSIUM SERPL-SCNC: 4.1 MMOL/L (ref 3.5–5.1)
RBC # BLD AUTO: 5.16 M/UL (ref 4.1–5.7)
SODIUM SERPL-SCNC: 138 MMOL/L (ref 136–145)
SPECIMEN EXP DATE BLD: NORMAL
WBC # BLD AUTO: 7.1 K/UL (ref 4.1–11.1)

## 2022-06-30 PROCEDURE — 85027 COMPLETE CBC AUTOMATED: CPT

## 2022-06-30 PROCEDURE — 80048 BASIC METABOLIC PNL TOTAL CA: CPT

## 2022-06-30 PROCEDURE — 83036 HEMOGLOBIN GLYCOSYLATED A1C: CPT

## 2022-06-30 PROCEDURE — 86900 BLOOD TYPING SEROLOGIC ABO: CPT

## 2022-06-30 RX ORDER — LOSARTAN POTASSIUM 100 MG/1
100 TABLET ORAL DAILY
COMMUNITY

## 2022-06-30 RX ORDER — HYDROCHLOROTHIAZIDE 25 MG/1
25 TABLET ORAL DAILY
COMMUNITY

## 2022-06-30 NOTE — PERIOP NOTES
INSTRUCTIONS GIVEN AND REVIEWED, 2 BOTTLES OF SOAP GIVEN, PT GIVEN TIME TO ASK QUESTIONS,     EKG RESULTS AND SOME LABS DONE AT PCP, PT HAS A UTI AND IS BEING TREATED AT THIS TIME BY PCP OFFICE     PT INSTRUCTED TO BRING COVID CARD THE MORNING OF SURGERY

## 2022-06-30 NOTE — PERIOP NOTES
[facility] INSTRUCTIONS  ORTHOPAEDIC    Surgery Date:   07/07/2022    Your surgeon's office or [facility] staff will call you between 4 PM- 8 PM the day before surgery with your arrival time. If your surgery is on a Monday, you will receive a call the preceding Friday. 1. Please report to [facility] Patient Access/Admitting on the 1st floor. Bring your insurance card, photo identification, and any copayment (if applicable). 2. If you are going home the same day of your surgery, you must have a responsible adult to drive you home. You need to have a responsible adult to stay with you the first 24 hours after surgery and you should not drive a car for 24 hours following your surgery. 3. Do NOT eat any solid foods after midnight the night before surgery including candy, mints or gum. You may drink clear liquids from midnight until 1 hour prior to arrival time. You may drink up to 12 ounces at one time every 4 hours. 4. Do NOT drink alcohol or smoke 24 hours before surgery. STOP smoking for 14 days prior as it helps with breathing and healing after surgery. 5. If your arrival time is 3pm or later, you may eat a light breakfast before 8am (toast, bagel-no butter, black coffee, plain tea, fruit juice-no pulp) Please note special instructions, if applicable, below for medications. 6. If you are being admitted to the hospital,please leave personal belongings/luggage in your car until you have an assigned hospital room number. 7. Please wear comfortable clothes. Wear your glasses instead of contacts. We ask that all money, jewelry and valuables be left at home. Wear no make up, particularly mascara, the day of surgery. 8.  All body piercings, rings, and jewelry need to be removed and left at home. Please remove any nail polish or artificial nails from your fingernails. Please wear your hair loose or down. Please no pony-tails, buns, or any metal hair accessories.  If you shower the morning of surgery, please do not apply any lotions or powders afterwards. You may wear deodorant. Do not shave any body area within 24 hours of your surgery. 9. Please follow all instructions to avoid any potential surgical cancellation. 10. Should your physical condition change, (i.e. fever, cold, flu, etc.) please notify your surgeon as soon as possible. 11. It is important to be on time. If a situation occurs where you may be delayed, please call:  [phone] / [phone] on the day of surgery. 12. The Preadmission Testing staff can be reached at [phone]. 13. Special instructions: NONE    Current Outpatient Medications   Medication Sig    losartan (COZAAR) 100 mg tablet Take 100 mg by mouth daily.  hydroCHLOROthiazide (HYDRODIURIL) 25 mg tablet Take 25 mg by mouth daily.  clindamycin (CLEOCIN) 300 mg capsule Take 1 Capsule by mouth two (2) times a day. Patient is to take two po one hour before dental procedure  Patient is to take two po one hour after dental procedure    cholecalciferol (VITAMIN D3) 25 mcg (1,000 unit) cap Take 1,000 Units by mouth daily.  amLODIPine (NORVASC) 10 mg tablet Take 5 mg by mouth daily.  triamcinolone acetonide (KENALOG) 0.5 % ointment Apply  to affected area daily as needed for Skin Irritation. use thin layer    aspirin delayed-release 81 mg tablet Take 81 mg by mouth daily.  levothyroxine (SYNTHROID) 100 mcg tablet Take 100 mcg by mouth Daily (before breakfast).  pravastatin (PRAVACHOL) 20 mg tablet Take 20 mg by mouth daily.  MULTIVIT-MIN/FOLIC/VIT K/LYCOP (ONE-A-DAY MEN'S MULTIVITAMIN PO) Take 1 Tab by mouth daily. No current facility-administered medications for this encounter. 1. YOU MUST ONLY TAKE THESE MEDICATIONS THE MORNING OF SURGERY WITH A SIP OF WATER: PRAVASTATIN, LEVOTHYROXINE, AMLODIPINE  2. MEDICATIONS TO TAKE THE MORNING OF SURGERY ONLY IF NEEDED: TYLENOL  3.  HOLD these prescription medications BEFORE Surgery: LOSARTAN, HYDROCHLOROTHIAZIDE, ADÁN ASPIRIN, TRIAMCINOLONE,  STOP AS OF TODAY 06/30/2022: ONE A DAY, VITAMIN D,   4. Ask your surgeon/prescribing physician about when/if to STOP taking these medications: NONE  5. Stop any non-steroidal anti-inflammatory drugs (i.e. Ibuprofen, Naproxen, Advil, Aleve) 3 days before surgery. You may take Tylenol. STOP all vitamins and herbal supplements 1 week prior to  surgery. 6. If you are currently taking Plavix, Coumadin, or any other blood-thinning/anticoagulant medication contact your prescribing physician for instructions. Preventing Infections Before and After - Your Surgery    IMPORTANT INSTRUCTIONS    You play an important role in your health and preparation for surgery. To reduce the germs on your skin you will need to shower with CHG soap (Chorhexidine gluconate 4%) two times before surgery. CHG soap (Hibiclens, Hex-A-Clens or store brand)   CHG soap will be provided at your Preadmission Testing (PAT) appointment.  If you do not have a PAT appointment before surgery, you may arrange to  CHG soap from our office or purchase CHG soap at a pharmacy, grocery or department store.  You need to purchase TWO 4 ounce bottles to use for your 2 showers. Steps to follow:  1. Wash your hair with your normal shampoo and your body with regular soap and rinse well to remove shampoo and soap from your skin. 2. Wet a clean washcloth and turn off the shower. 3. Put CHG soap on washcloth and apply to your entire body from the neck down. Do not use on your head, face or private parts(genitals). Do not use CHG soap on open sores, wounds or areas of skin irritation. 4. Wash you body gently for 5 minutes. Do not wash your skin too hard. This soap does not create lather. Pay special attention to your underarms and from your belly button to your feet. 5. Turn the shower back on and rinse well to get CHG soap off your body. 6. Pat your skin dry with a clean, dry towel.  Do not apply lotions or moisturizer. 7. Put on clean clothes and sleep on fresh bed sheets and do not allow pets to sleep with you. Shower with CHG soap 2 times before your surgery   The evening before your surgery   The morning of your surgery      Tips to help prevent infections after your surgery:  1. Protect your surgical wound from germs:  ? Hand washing is the most important thing you and your caregivers can do to prevent infections. ? Keep your bandage clean and dry! ? Do not touch your surgical wound. 2. Use clean, freshly washed towels and washcloths every time you shower; do not share bath linens with others. 3. Until your surgical wound is healed, wear clothing and sleep on bed linens each day that are clean and freshly washed. 4. Do not allow pets to sleep in your bed with you or touch your surgical wound. 5. Do not smoke - smoking delays wound healing. This may be a good time to stop smoking. 6. If you have diabetes, it is important for you to manage your blood sugar levels properly before your surgery as well as after your surgery. Poorly managed blood sugar levels slow down wound healing and prevent you from healing completely. Prevention of Infection  Testing for Staphylococcus aureus on your skin before surgery    Staphylococcus aureus (staph) is a common bacteria that is found on the body. It normally does not cause infection on healthy skin. Before surgery, you will be tested to see if you have staph by swabbing the inside of your nose. When you have an incision with surgery, the goal is to protect that incision from infection. Removal of the staph bacteria before surgery can decrease the risk of a surgical site infection. If your nose swab is positive for staph you will be called. Your treatment will include 2 steps:   Prescription for Mupirocin ointment to be used in each nostril twice a day for 5 days.    Showering with Chlorhexidine (CHG) liquid soap for 5 days prior to surgery. How to use Mupirocin ointment in your nose  1.  the prescription from your pharmacy. You will receive a large tube of ointment which will be big enough for all of your treatments. You will apply this ointment to each nostril 2 times a day for 5 days. 2. Wash your hands with  gel or soap and water for 20 seconds before using ointment. 3. Place a pea-sized amount of ointment on a cotton Q-tip. 4. Apply ointment just inside of each nostril with the Q-tip. Do not push Q-tip or ointment deep inside you nose. 5. Press your nostrils together and massage for a few seconds. 6. Wash your hands with  gel or soap and water after you are finished. 7. Do not get ointment near your eyes. If it gets into your eyes, rinse them with cool water. 8. If you need to use nasal spray, clean the tip of the bottle with alcohol before use and do not use both at the same time. 9. If you are scheduled for COVID testing during the 5 days, do NOT apply morning dose until after the COVID test has been performed. How to use Chlorhexidine (CHG) 4% liquid soap  1. Purchase an 8 ounce bottle of CHG liquid soap (Chlorhexidine 4%, Hibiclens, Hex-A-Clens or store brand) at a pharmacy or grocery store. 2. Wash your hair with your normal shampoo and your body with regular soap and rinse well to remove shampoo and soap from your skin. 3. Wet a clean washcloth and turn off the shower. 4. Put CHG soap on washcloth and apply to your entire body from the neck down. Do not use on your head, face or private parts(genitals). Do not use CHG soap on open sores, wounds or areas of skin irritation. 5. Wash your body gently for 5 minutes. Do not wash your skin too hard. This soap does not create lather. Pay special attention to your underarms and from your belly button to your feet. 6. Turn the shower back on and rinse well to get CHG soap off your body. 7. Pat your skin dry with a clean, dry towel.  Do not apply lotions or moisturizer. 8. Put on clean clothes and sleep on fresh bed sheets the night before surgery. Do not allow pets to sleep with you. Eating and Drinking Before Surgery     You may eat a regular dinner at the usual time on the day before your surgery.  Do NOT eat any solid foods after midnight unless your arrival time at the hospital is 3pm or later.  You may drink clear liquids only from 12 midnight until 1 hours prior to your arrival time at the hospital on the day of your surgery. Do NOT drink alcohol.  Clear liquids include:  o Water  o Fruit juices without pulp( i.e. apple juice)  o Carbonated beverages  o Black coffee (no cream/milk)  o Tea (no cream/milk)  o Gatorade   You may drink up to 12-16 ounces at one time every 4 hours between the hours of midnight and 1 hour before your arrival time at the hospital. Example- if your arrival time at the hospital is 6am, you may drink 12-16 ounces of clear liquids no later than 5am.   If your arrival time at the hospital is 3pm or later, you may eat a light breakfast before 8am.   A light breakfast includes:  o Toast or bagel (no butter)  o Black coffee (no cream/milk)  o Tea (no cream/milk)  o Fruit juices without pulp ( i.e. apple juice)  o Do NOT eat meat, eggs, vegetables or fruit   If you have any questions, please contact your surgeon's office. Patient Information Regarding COVID Restrictions    Day of Procedure     Please park in the parking deck or any designated visitor parking lot.  Enter the facility through the [facility].   On the day of surgery, please provide the cell phone number of the person who will be waiting for you to the Patient Access representative at the time of registration.  Please wear a mask on the day of your procedure.  We are now allowing two designated visitors per stay. Pediatric patients may have 2 designated visitors.  These two people may come in with you on the day of your procedure.  The designated visitor must also wear a mask.  Once your procedure and the immediate recovery period is completed, a nurse in the recovery area will contact your designated visitor to inform them of your room number or to otherwise review other pertinent information regarding your care.  Social distancing practices are to be adhered to in waiting areas and the cafeteria. The patient was contacted in person. He verbalized understanding of all instructions does not  need reinforcement.

## 2022-07-01 LAB
BACTERIA SPEC CULT: NORMAL
BACTERIA SPEC CULT: NORMAL
SERVICE CMNT-IMP: NORMAL

## 2022-07-01 NOTE — PERIOP NOTES
PAT Nurse Practitioner   Pre-Operative Chart Review/Assessment:-ORTHOPEDIC                Patient Name:  Fabiana Alexander                                                           Age:   78 y.o.    :  1955     Today's Date:  2022     Date of PAT:   2022      Date of Surgery:    2022      Procedure(s):  Left Total Shoulder Arthroplasty     Surgeon:   Dr. Dewayne Bueno                       PLAN:      1)  Medical Clearance:  Dr. Alexandrea Mae      2)  Cardiac Clearance:  EKG and METs reviewed. No further cardiac evaluation requested. PCP EKG 22: normal EKG, normal sinus rhythm. 3)  Diabetic Treatment Consult:  Not indicated. A1c-5.5      4)  Sleep Apnea evaluation:   +ALLEN dx.  Mild per pt, sleeps w/ head elevated       5) Treatment for MRSA/Staph Aureus:  Neg      6) Additional Concerns:  HTN, GERD, obesity, Native L side, pt currently being treated for UTI by PCP(U/A not repeated)                Vital Signs:         Vitals:    22 0955   BP: (!) 144/83   Pulse: 73   Resp: 18   Temp: 98.1 °F (36.7 °C)   Weight: 127.2 kg (280 lb 6.8 oz)   Height: 5' 9\" (1.753 m)            ____________________________________________  PAST MEDICAL HISTORY  Past Medical History:   Diagnosis Date    Arthritis     At risk for sleep apnea 10/19/2020    during phone assessment with PAT, ALLEN score 5    Cancer (HCC)     MELANOMA RIGHT EAR    Chronic pain     KNEES, SHOULDERS    GERD (gastroesophageal reflux disease)     High cholesterol     Hypertension     Hypothyroid     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      ____________________________________________  PAST SURGICAL HISTORY  Past Surgical History:   Procedure Laterality Date    COLONOSCOPY N/A 10/23/2020    COLONOSCOPY performed by Salima Gonzales MD at [facility] AM[facility] OR    COLONOSCOPY,REMHAWA RUSSELL,SNARE  10/23/2020         HX ADENOIDECTOMY  CHILD    HX COLONOSCOPY  2014    HX KNEE REPLACEMENT Left     HX MALIGNANT SKIN LESION EXCISION Right 05/2020    ear, melanoma    HX SHOULDER REPLACEMENT Right 06/2020    HX THYROIDECTOMY  12/2006    pt unsure if parathyroid or thyroid    HX TONSILLECTOMY  CHILD    HX WISDOM TEETH EXTRACTION        ____________________________________________  HOME MEDICATIONS  Current Outpatient Medications   Medication Sig    losartan (COZAAR) 100 mg tablet Take 100 mg by mouth daily.  hydroCHLOROthiazide (HYDRODIURIL) 25 mg tablet Take 25 mg by mouth daily.  clindamycin (CLEOCIN) 300 mg capsule Take 1 Capsule by mouth two (2) times a day. Patient is to take two po one hour before dental procedure  Patient is to take two po one hour after dental procedure    cholecalciferol (VITAMIN D3) 25 mcg (1,000 unit) cap Take 1,000 Units by mouth daily.  amLODIPine (NORVASC) 10 mg tablet Take 5 mg by mouth daily.  triamcinolone acetonide (KENALOG) 0.5 % ointment Apply  to affected area daily as needed for Skin Irritation. use thin layer    aspirin delayed-release 81 mg tablet Take 81 mg by mouth daily.  levothyroxine (SYNTHROID) 100 mcg tablet Take 100 mcg by mouth Daily (before breakfast).  pravastatin (PRAVACHOL) 20 mg tablet Take 20 mg by mouth daily.  MULTIVIT-MIN/FOLIC/VIT K/LYCOP (ONE-A-DAY MEN'S MULTIVITAMIN PO) Take 1 Tab by mouth daily. No current facility-administered medications for this encounter.      ____________________________________________  ALLERGIES  Allergies   Allergen Reactions    Pcn [Penicillins] Rash     Patient screened for any delayed non-IgE-mediated reaction to PCN.         Patient notes the following:    No delayed non-IgE-mediated reaction to PCN    Patient screened for any delayed non-IgE-mediated reaction to PCN.         Patient notes the following:    No delayed non-IgE-mediated reaction to PCN          ____________________________________________  SOCIAL HISTORY  Social History Tobacco Use    Smoking status: Never Smoker    Smokeless tobacco: Never Used   Substance Use Topics    Alcohol use: Not Currently      ____________________________________________   Internal Administration   First Dose      Second Dose         Last COVID Lab SARS-CoV-2, MAYKEL (no units)   Date Value   06/25/2021 Not Detected     SARS-CoV-2 ( )   Date Value   10/19/2020 Not Detected     SARS-CoV-2, MAYKEL 2 DAY TAT (no units)   Date Value   06/25/2021 Performed                    Labs:     Hospital Outpatient Visit on 06/30/2022   Component Date Value Ref Range Status    Sodium 06/30/2022 138  136 - 145 mmol/L Final    Potassium 06/30/2022 4.1  3.5 - 5.1 mmol/L Final    Chloride 06/30/2022 102  97 - 108 mmol/L Final    CO2 06/30/2022 29  21 - 32 mmol/L Final    Anion gap 06/30/2022 7  5 - 15 mmol/L Final    Glucose 06/30/2022 94  65 - 100 mg/dL Final    BUN 06/30/2022 17  6 - 20 MG/DL Final    Creatinine 06/30/2022 1.01  0.70 - 1.30 MG/DL Final    BUN/Creatinine ratio 06/30/2022 17  12 - 20   Final    GFR est AA 06/30/2022 >60  >60 ml/min/1.73m2 Final    GFR est non-AA 06/30/2022 >60  >60 ml/min/1.73m2 Final    Estimated GFR is calculated using the IDMS-traceable Modification of Diet in Renal Disease (MDRD) Study equation, reported for both  Americans (GFRAA) and non- Americans (GFRNA), and normalized to 1.73m2 body surface area. The physician must decide which value applies to the patient.     Calcium 06/30/2022 9.3  8.5 - 10.1 MG/DL Final    WBC 06/30/2022 7.1  4.1 - 11.1 K/uL Final    RBC 06/30/2022 5.16  4.10 - 5.70 M/uL Final    HGB 06/30/2022 15.4  12.1 - 17.0 g/dL Final    HCT 06/30/2022 46.5  36.6 - 50.3 % Final    MCV 06/30/2022 90.1  80.0 - 99.0 FL Final    MCH 06/30/2022 29.8  26.0 - 34.0 PG Final    MCHC 06/30/2022 33.1  30.0 - 36.5 g/dL Final    RDW 06/30/2022 12.8  11.5 - 14.5 % Final    PLATELET 74/98/5951 443  150 - 400 K/uL Final    MPV 06/30/2022 9.2  8.9 - 12.9 FL Final    NRBC 06/30/2022 0.0  0  WBC Final    ABSOLUTE NRBC 06/30/2022 0.00  0.00 - 0.01 K/uL Final    Crossmatch Expiration 06/30/2022 07/10/2022,2359   Final    ABO/Rh(D) 06/30/2022 O POSITIVE   Final    Antibody screen 06/30/2022 NEG   Final    Hemoglobin A1c 06/30/2022 5.5  4.0 - 5.6 % Final    Comment: NEW METHOD  PLEASE NOTE NEW REFERENCE RANGE  (NOTE)  HbA1C Interpretive Ranges  <5.7              Normal  5.7 - 6.4         Consider Prediabetes  >6.5              Consider Diabetes      Est. average glucose 06/30/2022 111  mg/dL Final    Special Requests: 06/30/2022 NO SPECIAL REQUESTS    Final    Culture result: 06/30/2022 MRSA NOT PRESENT    Final       Skin:     Denies open wounds, cuts, sores, rashes or other areas of concern in PAT assessment.           Viviana Feliz NP

## 2022-07-06 ENCOUNTER — ANESTHESIA EVENT (OUTPATIENT)
Dept: SURGERY | Age: 67
DRG: 483 | End: 2022-07-06
Payer: MEDICARE

## 2022-07-07 ENCOUNTER — ANESTHESIA (OUTPATIENT)
Dept: SURGERY | Age: 67
DRG: 483 | End: 2022-07-07
Payer: MEDICARE

## 2022-07-07 ENCOUNTER — HOSPITAL ENCOUNTER (INPATIENT)
Age: 67
LOS: 1 days | Discharge: HOME | DRG: 483 | End: 2022-07-08
Attending: ORTHOPAEDIC SURGERY | Admitting: ORTHOPAEDIC SURGERY
Payer: MEDICARE

## 2022-07-07 DIAGNOSIS — M19.012 PRIMARY OSTEOARTHRITIS OF LEFT SHOULDER: Primary | ICD-10-CM

## 2022-07-07 PROBLEM — M19.90 OSTEOARTHRITIS: Status: ACTIVE | Noted: 2022-07-07

## 2022-07-07 LAB
GLUCOSE BLD STRIP.AUTO-MCNC: 108 MG/DL (ref 65–117)
SERVICE CMNT-IMP: NORMAL

## 2022-07-07 PROCEDURE — 2709999900 HC NON-CHARGEABLE SUPPLY: Performed by: ORTHOPAEDIC SURGERY

## 2022-07-07 PROCEDURE — 74011250636 HC RX REV CODE- 250/636: Performed by: NURSE ANESTHETIST, CERTIFIED REGISTERED

## 2022-07-07 PROCEDURE — 77030026438 HC STYL ET INTUB CARD -A: Performed by: ANESTHESIOLOGY

## 2022-07-07 PROCEDURE — 76060000037 HC ANESTHESIA 3 TO 3.5 HR: Performed by: ORTHOPAEDIC SURGERY

## 2022-07-07 PROCEDURE — 74011000250 HC RX REV CODE- 250: Performed by: ORTHOPAEDIC SURGERY

## 2022-07-07 PROCEDURE — 77030036638 HC ACC KT GPS KNE V2 EXAC -D: Performed by: ORTHOPAEDIC SURGERY

## 2022-07-07 PROCEDURE — 0RRK0JZ REPLACEMENT OF LEFT SHOULDER JOINT WITH SYNTHETIC SUBSTITUTE, OPEN APPROACH: ICD-10-PCS | Performed by: ORTHOPAEDIC SURGERY

## 2022-07-07 PROCEDURE — 77030008684 HC TU ET CUF COVD -B: Performed by: ANESTHESIOLOGY

## 2022-07-07 PROCEDURE — 74011250637 HC RX REV CODE- 250/637: Performed by: STUDENT IN AN ORGANIZED HEALTH CARE EDUCATION/TRAINING PROGRAM

## 2022-07-07 PROCEDURE — 23472 RECONSTRUCT SHOULDER JOINT: CPT | Performed by: ORTHOPAEDIC SURGERY

## 2022-07-07 PROCEDURE — G0378 HOSPITAL OBSERVATION PER HR: HCPCS

## 2022-07-07 PROCEDURE — 77030002922 HC SUT FBRWRE ARTH -B: Performed by: ORTHOPAEDIC SURGERY

## 2022-07-07 PROCEDURE — 23430 REPAIR BICEPS TENDON: CPT | Performed by: PHYSICIAN ASSISTANT

## 2022-07-07 PROCEDURE — 77030041680 HC PNCL ELECSURG SMK EVAC CNMD -B: Performed by: ORTHOPAEDIC SURGERY

## 2022-07-07 PROCEDURE — 77030003601 HC NDL NRV BLK BBMI -A

## 2022-07-07 PROCEDURE — 74011250636 HC RX REV CODE- 250/636: Performed by: STUDENT IN AN ORGANIZED HEALTH CARE EDUCATION/TRAINING PROGRAM

## 2022-07-07 PROCEDURE — 74011000250 HC RX REV CODE- 250: Performed by: NURSE ANESTHETIST, CERTIFIED REGISTERED

## 2022-07-07 PROCEDURE — 76010000173 HC OR TIME 3 TO 3.5 HR INTENSV-TIER 1: Performed by: ORTHOPAEDIC SURGERY

## 2022-07-07 PROCEDURE — 77030002912 HC SUT ETHBND J&J -A: Performed by: ORTHOPAEDIC SURGERY

## 2022-07-07 PROCEDURE — 76210000016 HC OR PH I REC 1 TO 1.5 HR: Performed by: ORTHOPAEDIC SURGERY

## 2022-07-07 PROCEDURE — 77030002933 HC SUT MCRYL J&J -A: Performed by: ORTHOPAEDIC SURGERY

## 2022-07-07 PROCEDURE — 77030040361 HC SLV COMPR DVT MDII -B: Performed by: ORTHOPAEDIC SURGERY

## 2022-07-07 PROCEDURE — 74011250637 HC RX REV CODE- 250/637: Performed by: ORTHOPAEDIC SURGERY

## 2022-07-07 PROCEDURE — 77030040922 HC BLNKT HYPOTHRM STRY -A

## 2022-07-07 PROCEDURE — 74011000258 HC RX REV CODE- 258: Performed by: STUDENT IN AN ORGANIZED HEALTH CARE EDUCATION/TRAINING PROGRAM

## 2022-07-07 PROCEDURE — 74011000258 HC RX REV CODE- 258: Performed by: NURSE ANESTHETIST, CERTIFIED REGISTERED

## 2022-07-07 PROCEDURE — 74011250636 HC RX REV CODE- 250/636: Performed by: ORTHOPAEDIC SURGERY

## 2022-07-07 PROCEDURE — 77030031139 HC SUT VCRL2 J&J -A: Performed by: ORTHOPAEDIC SURGERY

## 2022-07-07 PROCEDURE — 77030036560 HC SHLDR ARM PAD ABDUCTN S2SG -B: Performed by: ORTHOPAEDIC SURGERY

## 2022-07-07 PROCEDURE — C9290 INJ, BUPIVACAINE LIPOSOME: HCPCS | Performed by: STUDENT IN AN ORGANIZED HEALTH CARE EDUCATION/TRAINING PROGRAM

## 2022-07-07 PROCEDURE — C1776 JOINT DEVICE (IMPLANTABLE): HCPCS | Performed by: ORTHOPAEDIC SURGERY

## 2022-07-07 PROCEDURE — 74011000250 HC RX REV CODE- 250: Performed by: STUDENT IN AN ORGANIZED HEALTH CARE EDUCATION/TRAINING PROGRAM

## 2022-07-07 PROCEDURE — 82962 GLUCOSE BLOOD TEST: CPT

## 2022-07-07 PROCEDURE — 77030006835 HC BLD SAW SAG STRY -B: Performed by: ORTHOPAEDIC SURGERY

## 2022-07-07 PROCEDURE — C1713 ANCHOR/SCREW BN/BN,TIS/BN: HCPCS | Performed by: ORTHOPAEDIC SURGERY

## 2022-07-07 PROCEDURE — 65270000029 HC RM PRIVATE

## 2022-07-07 PROCEDURE — 23472 RECONSTRUCT SHOULDER JOINT: CPT | Performed by: PHYSICIAN ASSISTANT

## 2022-07-07 PROCEDURE — 74011250637 HC RX REV CODE- 250/637

## 2022-07-07 PROCEDURE — 23430 REPAIR BICEPS TENDON: CPT | Performed by: ORTHOPAEDIC SURGERY

## 2022-07-07 DEVICE — SHOULDER S1 TOT STD ANAT -- IMPL CAPPED S1: Type: IMPLANTABLE DEVICE | Site: SHOULDER | Status: FUNCTIONAL

## 2022-07-07 DEVICE — CEMENT BONE 70GM FULL DOSE CA PHOS W/ GENTMYCN HI VISC N: Type: IMPLANTABLE DEVICE | Site: SHOULDER | Status: FUNCTIONAL

## 2022-07-07 DEVICE — IMPLANTABLE DEVICE
Type: IMPLANTABLE DEVICE | Site: SHOULDER | Status: FUNCTIONAL
Brand: EQUINOXE

## 2022-07-07 RX ORDER — ACETAMINOPHEN 500 MG
1000 TABLET ORAL ONCE
Status: COMPLETED | OUTPATIENT
Start: 2022-07-07 | End: 2022-07-07

## 2022-07-07 RX ORDER — NALOXONE HYDROCHLORIDE 0.4 MG/ML
0.4 INJECTION, SOLUTION INTRAMUSCULAR; INTRAVENOUS; SUBCUTANEOUS AS NEEDED
Status: DISCONTINUED | OUTPATIENT
Start: 2022-07-07 | End: 2022-07-08 | Stop reason: HOSPADM

## 2022-07-07 RX ORDER — MORPHINE SULFATE 2 MG/ML
2 INJECTION, SOLUTION INTRAMUSCULAR; INTRAVENOUS
Status: DISCONTINUED | OUTPATIENT
Start: 2022-07-07 | End: 2022-07-07 | Stop reason: HOSPADM

## 2022-07-07 RX ORDER — SODIUM CHLORIDE 0.9 % (FLUSH) 0.9 %
5-40 SYRINGE (ML) INJECTION EVERY 8 HOURS
Status: DISCONTINUED | OUTPATIENT
Start: 2022-07-07 | End: 2022-07-07 | Stop reason: HOSPADM

## 2022-07-07 RX ORDER — ONDANSETRON 2 MG/ML
INJECTION INTRAMUSCULAR; INTRAVENOUS AS NEEDED
Status: DISCONTINUED | OUTPATIENT
Start: 2022-07-07 | End: 2022-07-07 | Stop reason: HOSPADM

## 2022-07-07 RX ORDER — HYDROMORPHONE HYDROCHLORIDE 1 MG/ML
0.5 INJECTION, SOLUTION INTRAMUSCULAR; INTRAVENOUS; SUBCUTANEOUS
Status: DISCONTINUED | OUTPATIENT
Start: 2022-07-07 | End: 2022-07-08 | Stop reason: HOSPADM

## 2022-07-07 RX ORDER — ACETAMINOPHEN 325 MG/1
650 TABLET ORAL EVERY 6 HOURS
Status: DISCONTINUED | OUTPATIENT
Start: 2022-07-07 | End: 2022-07-08 | Stop reason: HOSPADM

## 2022-07-07 RX ORDER — BUPIVACAINE HYDROCHLORIDE 5 MG/ML
INJECTION, SOLUTION EPIDURAL; INTRACAUDAL
Status: COMPLETED | OUTPATIENT
Start: 2022-07-07 | End: 2022-07-07

## 2022-07-07 RX ORDER — HYDROMORPHONE HYDROCHLORIDE 1 MG/ML
0.2 INJECTION, SOLUTION INTRAMUSCULAR; INTRAVENOUS; SUBCUTANEOUS
Status: DISCONTINUED | OUTPATIENT
Start: 2022-07-07 | End: 2022-07-07 | Stop reason: HOSPADM

## 2022-07-07 RX ORDER — KETOROLAC TROMETHAMINE 30 MG/ML
15 INJECTION, SOLUTION INTRAMUSCULAR; INTRAVENOUS EVERY 6 HOURS
Status: COMPLETED | OUTPATIENT
Start: 2022-07-07 | End: 2022-07-08

## 2022-07-07 RX ORDER — SODIUM CHLORIDE 0.9 % (FLUSH) 0.9 %
5-40 SYRINGE (ML) INJECTION EVERY 8 HOURS
Status: DISCONTINUED | OUTPATIENT
Start: 2022-07-07 | End: 2022-07-08 | Stop reason: HOSPADM

## 2022-07-07 RX ORDER — FACIAL-BODY WIPES
10 EACH TOPICAL DAILY PRN
Status: DISCONTINUED | OUTPATIENT
Start: 2022-07-09 | End: 2022-07-08 | Stop reason: HOSPADM

## 2022-07-07 RX ORDER — GENTAMICIN SULFATE 40 MG/ML
INJECTION, SOLUTION INTRAMUSCULAR; INTRAVENOUS AS NEEDED
Status: DISCONTINUED | OUTPATIENT
Start: 2022-07-07 | End: 2022-07-07 | Stop reason: HOSPADM

## 2022-07-07 RX ORDER — MIDAZOLAM HYDROCHLORIDE 1 MG/ML
1 INJECTION, SOLUTION INTRAMUSCULAR; INTRAVENOUS AS NEEDED
Status: DISCONTINUED | OUTPATIENT
Start: 2022-07-07 | End: 2022-07-07 | Stop reason: HOSPADM

## 2022-07-07 RX ORDER — SODIUM CHLORIDE, SODIUM LACTATE, POTASSIUM CHLORIDE, CALCIUM CHLORIDE 600; 310; 30; 20 MG/100ML; MG/100ML; MG/100ML; MG/100ML
1000 INJECTION, SOLUTION INTRAVENOUS CONTINUOUS
Status: DISCONTINUED | OUTPATIENT
Start: 2022-07-07 | End: 2022-07-07 | Stop reason: HOSPADM

## 2022-07-07 RX ORDER — LOSARTAN POTASSIUM 50 MG/1
100 TABLET ORAL DAILY
Status: DISCONTINUED | OUTPATIENT
Start: 2022-07-08 | End: 2022-07-08 | Stop reason: HOSPADM

## 2022-07-07 RX ORDER — FENTANYL CITRATE 50 UG/ML
INJECTION, SOLUTION INTRAMUSCULAR; INTRAVENOUS AS NEEDED
Status: DISCONTINUED | OUTPATIENT
Start: 2022-07-07 | End: 2022-07-07 | Stop reason: HOSPADM

## 2022-07-07 RX ORDER — OXYCODONE HYDROCHLORIDE 5 MG/1
5 TABLET ORAL
Status: DISCONTINUED | OUTPATIENT
Start: 2022-07-07 | End: 2022-07-08 | Stop reason: HOSPADM

## 2022-07-07 RX ORDER — MIDAZOLAM HYDROCHLORIDE 1 MG/ML
0.5 INJECTION, SOLUTION INTRAMUSCULAR; INTRAVENOUS
Status: DISCONTINUED | OUTPATIENT
Start: 2022-07-07 | End: 2022-07-07 | Stop reason: HOSPADM

## 2022-07-07 RX ORDER — BACITRACIN ZINC 500 UNIT/G
OINTMENT (GRAM) TOPICAL AS NEEDED
Status: DISCONTINUED | OUTPATIENT
Start: 2022-07-07 | End: 2022-07-07 | Stop reason: HOSPADM

## 2022-07-07 RX ORDER — SUCCINYLCHOLINE CHLORIDE 20 MG/ML
INJECTION INTRAMUSCULAR; INTRAVENOUS AS NEEDED
Status: DISCONTINUED | OUTPATIENT
Start: 2022-07-07 | End: 2022-07-07 | Stop reason: HOSPADM

## 2022-07-07 RX ORDER — ACETAMINOPHEN 325 MG/1
650 TABLET ORAL
Status: DISCONTINUED | OUTPATIENT
Start: 2022-07-07 | End: 2022-07-07

## 2022-07-07 RX ORDER — FENTANYL CITRATE 50 UG/ML
25 INJECTION, SOLUTION INTRAMUSCULAR; INTRAVENOUS
Status: DISCONTINUED | OUTPATIENT
Start: 2022-07-07 | End: 2022-07-07 | Stop reason: HOSPADM

## 2022-07-07 RX ORDER — FAMOTIDINE 20 MG/1
20 TABLET, FILM COATED ORAL 2 TIMES DAILY
Status: DISCONTINUED | OUTPATIENT
Start: 2022-07-07 | End: 2022-07-08 | Stop reason: HOSPADM

## 2022-07-07 RX ORDER — LEVOTHYROXINE SODIUM 50 UG/1
100 TABLET ORAL
Status: DISCONTINUED | OUTPATIENT
Start: 2022-07-08 | End: 2022-07-08 | Stop reason: HOSPADM

## 2022-07-07 RX ORDER — PROPOFOL 10 MG/ML
INJECTION, EMULSION INTRAVENOUS AS NEEDED
Status: DISCONTINUED | OUTPATIENT
Start: 2022-07-07 | End: 2022-07-07 | Stop reason: HOSPADM

## 2022-07-07 RX ORDER — SODIUM CHLORIDE 0.9 % (FLUSH) 0.9 %
5-40 SYRINGE (ML) INJECTION AS NEEDED
Status: DISCONTINUED | OUTPATIENT
Start: 2022-07-07 | End: 2022-07-07 | Stop reason: HOSPADM

## 2022-07-07 RX ORDER — ONDANSETRON 2 MG/ML
4 INJECTION INTRAMUSCULAR; INTRAVENOUS
Status: DISCONTINUED | OUTPATIENT
Start: 2022-07-07 | End: 2022-07-08 | Stop reason: HOSPADM

## 2022-07-07 RX ORDER — MELATONIN
1000 DAILY
Status: DISCONTINUED | OUTPATIENT
Start: 2022-07-08 | End: 2022-07-08 | Stop reason: HOSPADM

## 2022-07-07 RX ORDER — OXYCODONE HYDROCHLORIDE 5 MG/1
10 TABLET ORAL
Status: DISCONTINUED | OUTPATIENT
Start: 2022-07-07 | End: 2022-07-08 | Stop reason: HOSPADM

## 2022-07-07 RX ORDER — ROPIVACAINE HYDROCHLORIDE 5 MG/ML
30 INJECTION, SOLUTION EPIDURAL; INFILTRATION; PERINEURAL AS NEEDED
Status: DISCONTINUED | OUTPATIENT
Start: 2022-07-07 | End: 2022-07-07 | Stop reason: HOSPADM

## 2022-07-07 RX ORDER — LIDOCAINE HYDROCHLORIDE 20 MG/ML
INJECTION, SOLUTION EPIDURAL; INFILTRATION; INTRACAUDAL; PERINEURAL AS NEEDED
Status: DISCONTINUED | OUTPATIENT
Start: 2022-07-07 | End: 2022-07-07 | Stop reason: HOSPADM

## 2022-07-07 RX ORDER — FENTANYL CITRATE 50 UG/ML
50 INJECTION, SOLUTION INTRAMUSCULAR; INTRAVENOUS AS NEEDED
Status: DISCONTINUED | OUTPATIENT
Start: 2022-07-07 | End: 2022-07-07 | Stop reason: HOSPADM

## 2022-07-07 RX ORDER — HYDROCHLOROTHIAZIDE 25 MG/1
25 TABLET ORAL DAILY
Status: DISCONTINUED | OUTPATIENT
Start: 2022-07-08 | End: 2022-07-08 | Stop reason: HOSPADM

## 2022-07-07 RX ORDER — AMLODIPINE BESYLATE 5 MG/1
5 TABLET ORAL DAILY
Status: DISCONTINUED | OUTPATIENT
Start: 2022-07-08 | End: 2022-07-08 | Stop reason: HOSPADM

## 2022-07-07 RX ORDER — OXYCODONE HYDROCHLORIDE 5 MG/1
10 TABLET ORAL
Qty: 40 TABLET | Refills: 0 | Status: SHIPPED | OUTPATIENT
Start: 2022-07-07 | End: 2022-07-10

## 2022-07-07 RX ORDER — ASPIRIN 81 MG/1
81 TABLET ORAL DAILY
Status: DISCONTINUED | OUTPATIENT
Start: 2022-07-08 | End: 2022-07-08 | Stop reason: HOSPADM

## 2022-07-07 RX ORDER — OXYCODONE AND ACETAMINOPHEN 5; 325 MG/1; MG/1
1 TABLET ORAL AS NEEDED
Status: DISCONTINUED | OUTPATIENT
Start: 2022-07-07 | End: 2022-07-07 | Stop reason: HOSPADM

## 2022-07-07 RX ORDER — ROCURONIUM BROMIDE 10 MG/ML
INJECTION, SOLUTION INTRAVENOUS AS NEEDED
Status: DISCONTINUED | OUTPATIENT
Start: 2022-07-07 | End: 2022-07-07 | Stop reason: HOSPADM

## 2022-07-07 RX ORDER — HYDROXYZINE HYDROCHLORIDE 10 MG/1
10 TABLET, FILM COATED ORAL
Status: DISCONTINUED | OUTPATIENT
Start: 2022-07-07 | End: 2022-07-08 | Stop reason: HOSPADM

## 2022-07-07 RX ORDER — LIDOCAINE HYDROCHLORIDE 10 MG/ML
0.1 INJECTION, SOLUTION EPIDURAL; INFILTRATION; INTRACAUDAL; PERINEURAL AS NEEDED
Status: DISCONTINUED | OUTPATIENT
Start: 2022-07-07 | End: 2022-07-07 | Stop reason: HOSPADM

## 2022-07-07 RX ORDER — HYDROMORPHONE HYDROCHLORIDE 2 MG/ML
INJECTION, SOLUTION INTRAMUSCULAR; INTRAVENOUS; SUBCUTANEOUS AS NEEDED
Status: DISCONTINUED | OUTPATIENT
Start: 2022-07-07 | End: 2022-07-07

## 2022-07-07 RX ORDER — SODIUM CHLORIDE 9 MG/ML
125 INJECTION, SOLUTION INTRAVENOUS CONTINUOUS
Status: DISCONTINUED | OUTPATIENT
Start: 2022-07-07 | End: 2022-07-07 | Stop reason: HOSPADM

## 2022-07-07 RX ORDER — POLYETHYLENE GLYCOL 3350 17 G/17G
17 POWDER, FOR SOLUTION ORAL DAILY
Status: DISCONTINUED | OUTPATIENT
Start: 2022-07-08 | End: 2022-07-08 | Stop reason: HOSPADM

## 2022-07-07 RX ORDER — DIPHENHYDRAMINE HYDROCHLORIDE 50 MG/ML
12.5 INJECTION, SOLUTION INTRAMUSCULAR; INTRAVENOUS AS NEEDED
Status: DISCONTINUED | OUTPATIENT
Start: 2022-07-07 | End: 2022-07-07 | Stop reason: HOSPADM

## 2022-07-07 RX ORDER — SODIUM CHLORIDE, SODIUM LACTATE, POTASSIUM CHLORIDE, CALCIUM CHLORIDE 600; 310; 30; 20 MG/100ML; MG/100ML; MG/100ML; MG/100ML
INJECTION, SOLUTION INTRAVENOUS
Status: DISCONTINUED | OUTPATIENT
Start: 2022-07-07 | End: 2022-07-07 | Stop reason: HOSPADM

## 2022-07-07 RX ORDER — AMOXICILLIN 250 MG
1 CAPSULE ORAL 2 TIMES DAILY
Status: DISCONTINUED | OUTPATIENT
Start: 2022-07-07 | End: 2022-07-08 | Stop reason: HOSPADM

## 2022-07-07 RX ORDER — ONDANSETRON 2 MG/ML
4 INJECTION INTRAMUSCULAR; INTRAVENOUS AS NEEDED
Status: DISCONTINUED | OUTPATIENT
Start: 2022-07-07 | End: 2022-07-07 | Stop reason: HOSPADM

## 2022-07-07 RX ORDER — SODIUM CHLORIDE 9 MG/ML
125 INJECTION, SOLUTION INTRAVENOUS CONTINUOUS
Status: DISCONTINUED | OUTPATIENT
Start: 2022-07-07 | End: 2022-07-08 | Stop reason: HOSPADM

## 2022-07-07 RX ORDER — SODIUM CHLORIDE, SODIUM LACTATE, POTASSIUM CHLORIDE, CALCIUM CHLORIDE 600; 310; 30; 20 MG/100ML; MG/100ML; MG/100ML; MG/100ML
125 INJECTION, SOLUTION INTRAVENOUS CONTINUOUS
Status: DISCONTINUED | OUTPATIENT
Start: 2022-07-07 | End: 2022-07-07 | Stop reason: HOSPADM

## 2022-07-07 RX ORDER — SODIUM CHLORIDE 0.9 % (FLUSH) 0.9 %
5-40 SYRINGE (ML) INJECTION AS NEEDED
Status: DISCONTINUED | OUTPATIENT
Start: 2022-07-07 | End: 2022-07-08 | Stop reason: HOSPADM

## 2022-07-07 RX ORDER — PRAVASTATIN SODIUM 20 MG/1
20 TABLET ORAL DAILY
Status: DISCONTINUED | OUTPATIENT
Start: 2022-07-08 | End: 2022-07-08 | Stop reason: HOSPADM

## 2022-07-07 RX ORDER — SODIUM CHLORIDE 9 MG/ML
1000 INJECTION, SOLUTION INTRAVENOUS CONTINUOUS
Status: DISCONTINUED | OUTPATIENT
Start: 2022-07-07 | End: 2022-07-07 | Stop reason: HOSPADM

## 2022-07-07 RX ORDER — SODIUM CHLORIDE 9 MG/ML
INJECTION, SOLUTION INTRAVENOUS
Status: DISCONTINUED | OUTPATIENT
Start: 2022-07-07 | End: 2022-07-07 | Stop reason: HOSPADM

## 2022-07-07 RX ADMIN — CEFAZOLIN 3 G: 10 INJECTION, POWDER, FOR SOLUTION INTRAVENOUS at 17:02

## 2022-07-07 RX ADMIN — ACETAMINOPHEN 1000 MG: 500 TABLET ORAL at 06:52

## 2022-07-07 RX ADMIN — SUGAMMADEX 200 MG: 100 INJECTION, SOLUTION INTRAVENOUS at 10:46

## 2022-07-07 RX ADMIN — PHENYLEPHRINE HYDROCHLORIDE 40 MCG/MIN: 10 INJECTION INTRAVENOUS at 08:12

## 2022-07-07 RX ADMIN — OXYCODONE 5 MG: 5 TABLET ORAL at 14:19

## 2022-07-07 RX ADMIN — FAMOTIDINE 20 MG: 20 TABLET ORAL at 17:01

## 2022-07-07 RX ADMIN — KETOROLAC TROMETHAMINE 15 MG: 30 INJECTION, SOLUTION INTRAMUSCULAR; INTRAVENOUS at 14:19

## 2022-07-07 RX ADMIN — SODIUM CHLORIDE: 900 INJECTION, SOLUTION INTRAVENOUS at 11:00

## 2022-07-07 RX ADMIN — KETOROLAC TROMETHAMINE 15 MG: 30 INJECTION, SOLUTION INTRAMUSCULAR; INTRAVENOUS at 19:03

## 2022-07-07 RX ADMIN — SODIUM CHLORIDE 125 ML/HR: 9 INJECTION, SOLUTION INTRAVENOUS at 11:34

## 2022-07-07 RX ADMIN — LIDOCAINE HYDROCHLORIDE 100 MG: 20 INJECTION, SOLUTION EPIDURAL; INFILTRATION; INTRACAUDAL; PERINEURAL at 08:03

## 2022-07-07 RX ADMIN — ROCURONIUM BROMIDE 20 MG: 10 SOLUTION INTRAVENOUS at 09:11

## 2022-07-07 RX ADMIN — OXYCODONE 5 MG: 5 TABLET ORAL at 20:51

## 2022-07-07 RX ADMIN — SODIUM CHLORIDE, POTASSIUM CHLORIDE, SODIUM LACTATE AND CALCIUM CHLORIDE 125 ML/HR: 600; 310; 30; 20 INJECTION, SOLUTION INTRAVENOUS at 06:47

## 2022-07-07 RX ADMIN — MIDAZOLAM 2 MG: 1 INJECTION INTRAMUSCULAR; INTRAVENOUS at 07:48

## 2022-07-07 RX ADMIN — BUPIVACAINE 10 ML: 13.3 INJECTION, SUSPENSION, LIPOSOMAL INFILTRATION at 07:55

## 2022-07-07 RX ADMIN — ROCURONIUM BROMIDE 45 MG: 10 SOLUTION INTRAVENOUS at 08:13

## 2022-07-07 RX ADMIN — SODIUM CHLORIDE, PRESERVATIVE FREE 10 ML: 5 INJECTION INTRAVENOUS at 14:00

## 2022-07-07 RX ADMIN — FENTANYL CITRATE 50 MCG: 50 INJECTION, SOLUTION INTRAMUSCULAR; INTRAVENOUS at 07:48

## 2022-07-07 RX ADMIN — FENTANYL CITRATE 50 MCG: 50 INJECTION, SOLUTION INTRAMUSCULAR; INTRAVENOUS at 09:11

## 2022-07-07 RX ADMIN — BUPIVACAINE HYDROCHLORIDE 15 ML: 5 INJECTION, SOLUTION EPIDURAL; INTRACAUDAL; PERINEURAL at 07:55

## 2022-07-07 RX ADMIN — ACETAMINOPHEN 650 MG: 325 TABLET ORAL at 17:01

## 2022-07-07 RX ADMIN — SENNOSIDES AND DOCUSATE SODIUM 1 TABLET: 50; 8.6 TABLET ORAL at 17:01

## 2022-07-07 RX ADMIN — FENTANYL CITRATE 25 MCG: 50 INJECTION, SOLUTION INTRAMUSCULAR; INTRAVENOUS at 11:40

## 2022-07-07 RX ADMIN — ONDANSETRON HYDROCHLORIDE 4 MG: 2 INJECTION, SOLUTION INTRAMUSCULAR; INTRAVENOUS at 10:23

## 2022-07-07 RX ADMIN — PROPOFOL 250 MG: 10 INJECTION, EMULSION INTRAVENOUS at 08:03

## 2022-07-07 RX ADMIN — FENTANYL CITRATE 25 MCG: 50 INJECTION, SOLUTION INTRAMUSCULAR; INTRAVENOUS at 11:35

## 2022-07-07 RX ADMIN — FENTANYL CITRATE 50 MCG: 50 INJECTION, SOLUTION INTRAMUSCULAR; INTRAVENOUS at 08:03

## 2022-07-07 RX ADMIN — ROCURONIUM BROMIDE 5 MG: 10 SOLUTION INTRAVENOUS at 08:03

## 2022-07-07 RX ADMIN — SUCCINYLCHOLINE CHLORIDE 200 MG: 20 INJECTION, SOLUTION INTRAMUSCULAR; INTRAVENOUS at 08:03

## 2022-07-07 RX ADMIN — SODIUM CHLORIDE, POTASSIUM CHLORIDE, SODIUM LACTATE AND CALCIUM CHLORIDE: 600; 310; 30; 20 INJECTION, SOLUTION INTRAVENOUS at 07:59

## 2022-07-07 RX ADMIN — CEFAZOLIN SODIUM 3 G: 10 INJECTION, POWDER, FOR SOLUTION INTRAVENOUS at 08:26

## 2022-07-07 NOTE — ANESTHESIA POSTPROCEDURE EVALUATION
Procedure(s):  LEFT TOTAL SHOULDER ARTHROPLASTY. regional, general    Anesthesia Post Evaluation        Patient location during evaluation: PACU  Patient participation: complete - patient participated  Level of consciousness: awake and alert  Pain management: adequate  Airway patency: patent  Anesthetic complications: no  Cardiovascular status: acceptable  Respiratory status: acceptable  Hydration status: acceptable  Comments: I have seen and evaluated the patient and is ready for discharge. Vanesa Sahu MD    Post anesthesia nausea and vomiting:  none      INITIAL Post-op Vital signs:   Vitals Value Taken Time   /73 07/07/22 1245   Temp 36.7 °C (98 °F) 07/07/22 1245   Pulse 83 07/07/22 1256   Resp 31 07/07/22 1256   SpO2 96 % 07/07/22 1256   Vitals shown include unvalidated device data.

## 2022-07-07 NOTE — PROGRESS NOTES
Problem: Falls - Risk of  Goal: *Absence of Falls  Description: Document Kelley Quinones Fall Risk and appropriate interventions in the flowsheet.   Outcome: Progressing Towards Goal  Note: Fall Risk Interventions:  Mobility Interventions: OT consult for ADLs         Medication Interventions: Patient to call before getting OOB                   Problem: Patient Education: Go to Patient Education Activity  Goal: Patient/Family Education  Outcome: Progressing Towards Goal     Problem: Patient Education: Go to Patient Education Activity  Goal: Patient/Family Education  Outcome: Progressing Towards Goal     Problem: Upper Extremity Surgical Pathway: Day of Surgery  Goal: Off Pathway (Use only if patient is Off Pathway)  Outcome: Progressing Towards Goal  Goal: Activity/Safety  Outcome: Progressing Towards Goal  Goal: Consults, if ordered  Outcome: Progressing Towards Goal  Goal: Diagnostic Test/Procedures  Outcome: Progressing Towards Goal  Goal: Nutrition/Diet  Outcome: Progressing Towards Goal  Goal: Medications  Outcome: Progressing Towards Goal  Goal: Respiratory  Outcome: Progressing Towards Goal  Goal: Treatments/Interventions/Procedures  Outcome: Progressing Towards Goal  Goal: Psychosocial  Outcome: Progressing Towards Goal  Goal: *Demonstrates progressive activity  Outcome: Progressing Towards Goal  Goal: *Optimal pain control at patient's stated goal  Outcome: Progressing Towards Goal  Goal: *Hemodynamically stable  Outcome: Progressing Towards Goal  Goal: *Labs within defined limits  Outcome: Progressing Towards Goal     Problem: Upper Extremity Surgical Pathway: POD 1  Goal: Off Pathway (Use only if patient is Off Pathway)  Outcome: Progressing Towards Goal  Goal: Activity/Safety  Outcome: Progressing Towards Goal  Goal: Diagnostic Test/Procedures  Outcome: Progressing Towards Goal  Goal: Nutrition/Diet  Outcome: Progressing Towards Goal  Goal: Discharge Planning  Outcome: Progressing Towards Goal  Goal: Medications  Outcome: Progressing Towards Goal  Goal: Respiratory  Outcome: Progressing Towards Goal  Goal: Treatments/Interventions/Procedures  Outcome: Progressing Towards Goal  Goal: Psychosocial  Outcome: Progressing Towards Goal     Problem: Upper Extremity Surgical Pathway: Discharge Outcomes  Goal: *Verbalizes name, dosage, time, side effects, and number of days to continue medications  Outcome: Progressing Towards Goal  Goal: *Describes available resources and support systems  Outcome: Progressing Towards Goal  Goal: *Describes follow-up/return visits to physicians  Outcome: Progressing Towards Goal  Goal: *Tolerating diet  Outcome: Progressing Towards Goal  Goal: *Optimal pain control at patient's stated goal  Outcome: Progressing Towards Goal  Goal: *Lungs clear or at baseline  Outcome: Progressing Towards Goal  Goal: *Afebrile  Outcome: Progressing Towards Goal  Goal: *Incision intact without signs of infection, redness, warmth  Outcome: Progressing Towards Goal  Goal: *Absence of deep venous thrombosis signs and symptoms(Stroke Metric)  Outcome: Progressing Towards Goal  Goal: *Active bowel function  Outcome: Progressing Towards Goal  Goal: *Adequate urinary output  Outcome: Progressing Towards Goal  Goal: *Discharge anxiety minimal  Outcome: Progressing Towards Goal  Goal: *Describes available resources and support systems  Outcome: Progressing Towards Goal  Goal: *Labs within defined limits  Outcome: Progressing Towards Goal  Goal: *Hemodynamically stable  Outcome: Progressing Towards Goal  Goal: *Demonstrates ability to don and doff sling/swath/positioning aid  Outcome: Progressing Towards Goal  Goal: *Modified independence with transfers, ambulation on levels with assistance devices, stair climbing, ADL's  Outcome: Progressing Towards Goal  Goal: *Demonstrates independence with home exercise program  Outcome: Progressing Towards Goal

## 2022-07-07 NOTE — BRIEF OP NOTE
Brief Postoperative Note    Patient: Rk Freeman  YOB: 1955  MRN: [id number]    Date of Procedure: 7/7/2022     Pre-Op Diagnosis: OSTEOARTHRITIS LEFT SHOULDER    Post-Op Diagnosis: Same as preoperative diagnosis. Left long head bicep tedinopathy    Procedure(s):  LEFT TOTAL SHOULDER ARTHROPLASTY    Open bicep tendodesis    Surgeon(s): Renata Hughes MD    Surgical Assistant: Physician Assistant: Yenni Dey PA-C  Surg Asst-1: Yanick BADILLO    Anesthesia: General with interscalene block    Estimated Blood Loss (mL): less than 195     Complications: None    Specimens: bone discarded    Implants:   Implant Name Type Inv.  Item Serial No.  Lot No. LRB No. Used Action   PLATE BNE 1.9CT MARJAN REPLICATOR O/S EQUINOXE - [id number]  PLATE BNE 9.6YZ MARJAN REPLICATOR O/S EQUINOXE [id number] [employer]WD NA Left 1 Implanted   SCREW BNE AD PED L47MM [id number] CANC SHLDR NONLOCKING - [id number]  SCREW BNE AD PED L47MM [id number] CANC SHLDR NONLOCKING [id number] [employer] NA Left 1 Implanted   Glenoid   [id number] [employer] NA Left 1 Implanted   CEMENT BONE 70GM FULL DOSE CA PHOS W/ GENTMYCN HI VISC N - SNA  CEMENT BONE 70GM FULL DOSE CA PHOS W/ GENTMYCN HI VISC N NA [employer]WD [id number] Left 1 Implanted   STEM HUM [id number] SHLDR JALEN PRESSFIT EQUINOXE - [id number]  STEM HUM [id number] SHLDR JALEN PRESSFIT EQUINOXE [id number] [employer]WD NA Left 1 Implanted   HEAD HUM [id number] SH SHLDR FOR HEMIARTHROPLASTY EQUINOXE - [id number]  HEAD HUM [id number] SH SHLDR FOR HEMIARTHROPLASTY EQUINOXE [id number] [employer] NA Left 1 Implanted       Drains: none    Findings: oa    Electronically Signed by Natali Chun MD on 7/7/2022 at 10:48 AM

## 2022-07-07 NOTE — ANESTHESIA PROCEDURE NOTES
Peripheral Block    Start time: 7/7/2022 7:50 AM  End time: 7/7/2022 7:55 AM  Performed by: Enzo Bence, DO  Authorized by: Enzo Bence, DO       Pre-procedure: Indications: at surgeon's request and post-op pain management    Preanesthetic Checklist: patient identified, risks and benefits discussed, site marked, timeout performed, anesthesia consent given, patient being monitored and fire risk safety assessment completed and verbalized    Timeout Time: 07:50 EDT          Block Type:   Block Type:   Interscalene  Laterality:  Left  Monitoring:  Standard ASA monitoring, continuous pulse ox, frequent vital sign checks, heart rate, responsive to questions and oxygen  Injection Technique:  Single shot  Procedures: ultrasound guided    Patient Position: supine  Prep: chlorhexidine    Location:  Interscalene  Needle Type:  Stimuplex  Needle Gauge:  21 G  Needle Localization:  Ultrasound guidance and anatomical landmarks  Medication Injected:  Bupivacaine (PF) (MARCAINE) 0.5% injection, 15 mL  bupivacaine liposome (PF) susp (EXPAREL) infiltration, 10 mL  Med Admin Time: 7/7/2022 7:55 AM    Assessment:  Number of attempts:  1  Injection Assessment:  Incremental injection every 5 mL, local visualized surrounding nerve on ultrasound, negative aspiration for blood, no paresthesia, no intravascular symptoms and ultrasound image on chart  Patient tolerance:  Patient tolerated the procedure well with no immediate complications

## 2022-07-07 NOTE — PROGRESS NOTES
Primary Nurse Marty Arellano, KELSIE and NAJMA CISNEROS, [facility] performed a dual skin assessment on this patient No impairment noted  Rob score is 19

## 2022-07-07 NOTE — H&P
TE PRE-OP HISTORY AND PHYSICAL    Subjective:     Patient is a 78 y.o. male presented with a history of left shoulder pain. Onset of symptoms was gradual with gradually worsening course since that time. He is being admitted for surgical management of this condition. 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    Osteoarthritis of right shoulder 06/25/2020    Osteoarthritis of left knee 04/23/2018     Past Medical History:   Diagnosis Date    Arthritis     At risk for sleep apnea 10/19/2020    during phone assessment with ALBA, ALLEN score 5    Cancer ([facility] [address]     MELANOMA RIGHT EAR    Chronic pain     KNEES, SHOULDERS    GERD (gastroesophageal reflux disease)     High cholesterol     Hypertension     Hypothyroid     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 Authorizing Provider   losartan (COZAAR) 100 mg tablet Take 100 mg by mouth daily. Yes Provider, Historical   hydroCHLOROthiazide (HYDRODIURIL) 25 mg tablet Take 25 mg by mouth daily.    Yes Provider, Historical   cholecalciferol (VITAMIN D3) 25 mcg (1,000 unit) cap Take 1,000 Units by mouth daily. Yes Provider, Historical   amLODIPine (NORVASC) 10 mg tablet Take 5 mg by mouth daily. Yes Provider, Historical   triamcinolone acetonide (KENALOG) 0.5 % ointment Apply  to affected area daily as needed for Skin Irritation. use thin layer   Yes Provider, Historical   aspirin delayed-release 81 mg tablet Take 81 mg by mouth daily. Yes Provider, Historical   levothyroxine (SYNTHROID) 100 mcg tablet Take 100 mcg by mouth Daily (before breakfast). Yes Provider, Historical   pravastatin (PRAVACHOL) 20 mg tablet Take 20 mg by mouth daily. Yes Provider, Historical   MULTIVIT-MIN/FOLIC/VIT K/LYCOP (ONE-A-DAY MEN'S MULTIVITAMIN PO) Take 1 Tab by mouth daily. Yes Provider, Historical   clindamycin (CLEOCIN) 300 mg capsule Take 1 Capsule by mouth two (2) times a day. Patient is to take two po one hour before dental procedure  Patient is to take two po one hour after dental procedure 1/31/22   Sarah Aguilar MD     Allergies   Allergen Reactions    Pcn [Penicillins] Rash     Patient screened for any delayed non-IgE-mediated reaction to PCN.         Patient notes the following:    No delayed non-IgE-mediated reaction to PCN    Patient screened for any delayed non-IgE-mediated reaction to PCN.         Patient notes the following:    No delayed non-IgE-mediated reaction to PCN          Social History     Tobacco Use    Smoking status: Never Smoker    Smokeless tobacco: Never Used   Substance Use Topics    Alcohol use: Not Currently      Family History   Problem Relation Age of Onset    Hypertension Mother     Cancer Father         MULT. MYELOMA    Diabetes Maternal Grandmother     Cancer Paternal Grandfather         LUNG CA    Anesth Problems Neg Hx     Asthma Neg Hx     Heart Disease Neg Hx       Review of Systems  A comprehensive review of systems was negative except for that written in the HPI.     Objective:     Patient Vitals for the past 8 hrs:   BP Temp Pulse Resp SpO2 Height Weight 07/07/22 0619 (!) 146/80 98.5 °F (36.9 °C) 78 18 95 % -- --   07/07/22 0611 -- -- -- -- -- 5' 9\" (1.753 m) 280 lb 6.8 oz (127.2 kg)     Visit Vitals  BP (!) 146/80 (BP 1 Location: Right upper arm, BP Patient Position: At rest;Semi fowlers)   Pulse 78   Temp 98.5 °F (36.9 °C)   Resp 18   Ht 5' 9\" (1.753 m)   Wt 280 lb 6.8 oz (127.2 kg)   SpO2 95%   BMI 41.41 kg/m²     General:  Alert, cooperative, no distress, appears stated age. Head:  Normocephalic, without obvious abnormality, atraumatic. Eyes:  Conjunctivae/corneas clear. PERRL, EOMs intact. Fundi benign   Ears:  Normal TMs and external ear canals both ears. Nose: Nares normal. Septum midline. Mucosa normal. No drainage or sinus tenderness. Throat: Lips, mucosa, and tongue normal. Teeth and gums normal.   Neck: Supple, symmetrical, trachea midline, no adenopathy, thyroid: no enlargement/tenderness/nodules, no carotid bruit and no JVD. Back:   Symmetric, no curvature. ROM normal. No CVA tenderness. Lungs:   Clear to auscultation bilaterally. Chest wall:  No tenderness or deformity. Heart:  Regular rate and rhythm, S1, S2 normal, no murmur, click, rub or gallop. Abdomen:   Soft, non-tender. Bowel sounds normal. No masses,  No organomegaly. Extremities: Left shoulder: pain with limited ROM. NVI   Pulses: 2+ and symmetric all extremities. Skin: Skin color, texture, turgor normal. No rashes or lesions   Lymph nodes: Cervical, supraclavicular, and axillary nodes normal.   Neurologic: CNII-XII intact. Normal strength, sensation and reflexes throughout. Assessment:     Active Problems:    * No active hospital problems. *      Plan:   The various methods of treatment have been discussed with the patient and family. After consideration of risks, benefits and other options for treatment, the patient has consented to surgical intervention. Risks of infection, DVT, PE, MI, CVA and unforeseen events described to the patient.   Additionally discussed the possibility of not being able to resolve all preop pain. Patient understands metal and plastic will be implanted in the body and understands the potential for revision surgery. Patient wishes to proceed with elective surgery.

## 2022-07-07 NOTE — ANESTHESIA PREPROCEDURE EVALUATION
Relevant Problems   No relevant active problems       Anesthetic History   No history of anesthetic complications            Review of Systems / Medical History  Patient summary reviewed, nursing notes reviewed and pertinent labs reviewed    Pulmonary  Within defined limits                 Neuro/Psych             Comments: Chronic pain Cardiovascular    Hypertension: well controlled          Hyperlipidemia    Exercise tolerance: >4 METS     GI/Hepatic/Renal     GERD           Endo/Other      Hypothyroidism  Morbid obesity and arthritis     Other Findings              Physical Exam    Airway  Mallampati: II  TM Distance: 4 - 6 cm  Neck ROM: normal range of motion   Mouth opening: Normal     Cardiovascular    Rhythm: regular  Rate: normal         Dental  No notable dental hx       Pulmonary  Breath sounds clear to auscultation               Abdominal  GI exam deferred       Other Findings            Anesthetic Plan    ASA: 3  Anesthesia type: regional and general - interscalene block          Induction: Intravenous  Anesthetic plan and risks discussed with: Patient

## 2022-07-07 NOTE — PERIOP NOTES
TRANSFER - OUT REPORT:    Verbal report given to KELSIE Silva  on Kinga Daniel  being transferred to Room 571 for routine progression of care       Report consisted of patients Situation, Background, Assessment and   Recommendations(SBAR). Time Pre op antibiotic given:0826  Anesthesia Stop time: 8753  Pop Present on Transfer to floor:no  Order for Pop on Chart:no  Discharge Prescriptions with Chart:no    Information from the following report(s) Procedure Summary, Intake/Output and MAR was reviewed with the receiving nurse. Opportunity for questions and clarification was provided. Is the patient on 02? NO       L/Min        Other     Is the patient on a monitor? NO    Is the nurse transporting with the patient? NO    Surgical Waiting Area notified of patient's transfer from PACU? YES      The following personal items collected during your admission accompanied patient upon transfer:   Dental Appliance: Dental Appliances: (S) Partials,Uppers (left at  home)  Vision: Visual Aid: Glasses  Hearing Aid:    Jewelry:    Clothing: Clothing: At bedside (clothing and eye glasses returned in PACU)  Other Valuables:  Other Valuables: Eyeglasses  Valuables sent to safe:

## 2022-07-07 NOTE — PROGRESS NOTES
Occupational Therapy  07/07/22    Orders received, chart review completed. Note patient POD #0 /p L TSA, not indicted as fast track. OT will follow up tomorrow for evaluation per ortho NP. Recommend OOB to chair three times a day for meals, self-completion of ADLs as able and medically stable.      Thank you,   Mallorie Bello, OTD, OTR/L

## 2022-07-07 NOTE — DISCHARGE INSTRUCTIONS
Postoperative Instructions    Medications/Diet    1. Eat only light, non-greasy foods today  2. Take pain medicine with food  3. While taking pain medicines, you may not operate a vehicle, heavy machinery, or appliances  4. While taking pain medicines, you may not drink alcoholic beverages  5. While taking pain medicines, you may not make critical decisions or sign legal papers  6. If you have any reactions to your medicines, stop taking them and call my office immediately  7. If you are not allergic, take one 325mg aspirin twice a day to help prevent blood clots  8. Please keep in mind that constipation is a very common side effect of taking narcotic pain medication. We recommend that patients take precautions to prevent constipation:   Drink plenty of water (6-8 glasses of 8 oz. a day)   Avoid alcohol, caffeine, and dairy products   Eat plenty of fiber (fruits, vegetables and whole grains)   Take an over the counter stool softener (colace or dulcolax)          Activity/Exercise    1. You may move the arm as directed by physical therapist  2. You may take arm out of sling and move elbow as necessary. Must wear sling while out of the house and while sleeping  3. You may change dressing daily. If no drainage, you may leave dressing off.  4. You may shower on post operative day #7  5. Please keep ice applied to the shoulder for the first 72 hours or as long as pain or swelling persist. Do not apply ice directly to skin, or allow water to leak on your dressing    Emergency/Follow-Up    1. Please notify my office at [phone] if you develop any fever (101° or above), unexpected warmth, redness or swelling in your shoulder  2. Please call if your fingers/toes become cold, purple, numb, or there is excessive bleeding  3. Please call the office within 24 hours at [phone]) to schedule a follow up appointment next week  4.  Please call the office before 3pm on Friday if you do not have enough pain medicine for the weekend.  Narcotic pain medication cannot be called into your pharmacy and the prescription must be picked up at our office

## 2022-07-07 NOTE — PROGRESS NOTES
Ortho NP Note    POD# 0  s/p LEFT TOTAL SHOULDER ARTHROPLASTY     Pt resting in bed. Reports ongoing numbness to left hand but otherwise without complaint. States he had right shoulder replacement approximately 2 years ago--no complications, tolerated oxycodone well at that time. Patient has not had something to eat/drink, pending meal tray delivery. No nausea. No SOB, no CP. Has not yet been out of bed but is aware of plan for OT evaluation on POD 1 prior to discharge. VSS Afebrile. Visit Vitals  /70 (BP 1 Location: Right upper arm, BP Patient Position: Semi fowlers)   Pulse 83   Temp 97.8 °F (36.6 °C)   Resp 16   Ht 5' 9\" (1.753 m)   Wt 127.2 kg (280 lb 6.8 oz)   SpO2 91%   BMI 41.41 kg/m²       Most Recent Labs:   Lab Results   Component Value Date/Time    HGB 15.4 06/30/2022 09:57 AM    Hemoglobin A1c 5.5 06/30/2022 09:57 AM       Body mass index is 41.41 kg/m². Reference: BMI greater than 30 is classified as obesity and greater than 40 is classified as morbid obesity. STOP BANG Score: + Dx of ALLEN    Voiding status: remains due to void     Gauze/tape dressing to left shoulder c.d.i with sling in place. Cryotherapy in place over incision. Bilateral UEs warm, dry. 1+ radial pulses  Sensation to light touch diminished in left upper arm    Plan:  1) Therapy: starting tomorrow, OT evaluation. Then will receive OP therapy after follow up visit. 2) Bertha-op Antibiotics Ancef  3) Pain:  Received tylenol in preop. Perioperative anesthesia: General with interscalene block. Post operative analgesia includes scheduled tylenol, toradol and PRN oxycodone or IV dilaudid depending on severity of pain   4) DVT Prophylaxis:   Encouraged early mobilization, bed exercises, and SCD use. 5) GI Prophylaxis: Pepcid BID  6) Hx of HTN: Current BP: 119/70. Resume home losartan, HCTZ, amlodipine if SBP > 125 as per protocol   7) Discharge plans: to home with family support.   Pharmacy: [facility] [place]/[address].        Michael Nguyen NP

## 2022-07-08 VITALS
RESPIRATION RATE: 16 BRPM | TEMPERATURE: 98 F | WEIGHT: 280.43 LBS | DIASTOLIC BLOOD PRESSURE: 76 MMHG | BODY MASS INDEX: 41.53 KG/M2 | OXYGEN SATURATION: 95 % | SYSTOLIC BLOOD PRESSURE: 130 MMHG | HEART RATE: 71 BPM | HEIGHT: 69 IN

## 2022-07-08 PROCEDURE — 97535 SELF CARE MNGMENT TRAINING: CPT

## 2022-07-08 PROCEDURE — 97165 OT EVAL LOW COMPLEX 30 MIN: CPT

## 2022-07-08 PROCEDURE — 74011000250 HC RX REV CODE- 250: Performed by: ORTHOPAEDIC SURGERY

## 2022-07-08 PROCEDURE — 97110 THERAPEUTIC EXERCISES: CPT

## 2022-07-08 PROCEDURE — 74011250636 HC RX REV CODE- 250/636: Performed by: ORTHOPAEDIC SURGERY

## 2022-07-08 PROCEDURE — 74011000258 HC RX REV CODE- 258: Performed by: STUDENT IN AN ORGANIZED HEALTH CARE EDUCATION/TRAINING PROGRAM

## 2022-07-08 PROCEDURE — 74011250637 HC RX REV CODE- 250/637

## 2022-07-08 PROCEDURE — 74011250636 HC RX REV CODE- 250/636: Performed by: STUDENT IN AN ORGANIZED HEALTH CARE EDUCATION/TRAINING PROGRAM

## 2022-07-08 PROCEDURE — 74011250637 HC RX REV CODE- 250/637: Performed by: ORTHOPAEDIC SURGERY

## 2022-07-08 RX ADMIN — SODIUM CHLORIDE, PRESERVATIVE FREE 10 ML: 5 INJECTION INTRAVENOUS at 07:00

## 2022-07-08 RX ADMIN — HYDROCHLOROTHIAZIDE 25 MG: 25 TABLET ORAL at 09:04

## 2022-07-08 RX ADMIN — CEFAZOLIN 3 G: 10 INJECTION, POWDER, FOR SOLUTION INTRAVENOUS at 03:12

## 2022-07-08 RX ADMIN — LEVOTHYROXINE SODIUM 100 MCG: 0.05 TABLET ORAL at 06:59

## 2022-07-08 RX ADMIN — POLYETHYLENE GLYCOL 3350 17 G: 17 POWDER, FOR SOLUTION ORAL at 09:04

## 2022-07-08 RX ADMIN — KETOROLAC TROMETHAMINE 15 MG: 30 INJECTION, SOLUTION INTRAMUSCULAR; INTRAVENOUS at 06:59

## 2022-07-08 RX ADMIN — PRAVASTATIN SODIUM 20 MG: 40 TABLET ORAL at 09:03

## 2022-07-08 RX ADMIN — SENNOSIDES AND DOCUSATE SODIUM 1 TABLET: 50; 8.6 TABLET ORAL at 09:03

## 2022-07-08 RX ADMIN — FAMOTIDINE 20 MG: 20 TABLET ORAL at 09:03

## 2022-07-08 RX ADMIN — LOSARTAN POTASSIUM 100 MG: 50 TABLET, FILM COATED ORAL at 09:02

## 2022-07-08 RX ADMIN — SODIUM CHLORIDE 125 ML/HR: 9 INJECTION, SOLUTION INTRAVENOUS at 01:15

## 2022-07-08 RX ADMIN — ACETAMINOPHEN 650 MG: 325 TABLET ORAL at 06:59

## 2022-07-08 RX ADMIN — ASPIRIN 81 MG: 81 TABLET, COATED ORAL at 09:02

## 2022-07-08 RX ADMIN — ACETAMINOPHEN 650 MG: 325 TABLET ORAL at 01:11

## 2022-07-08 RX ADMIN — Medication 1000 UNITS: at 09:02

## 2022-07-08 RX ADMIN — OXYCODONE 5 MG: 5 TABLET ORAL at 09:20

## 2022-07-08 RX ADMIN — KETOROLAC TROMETHAMINE 15 MG: 30 INJECTION, SOLUTION INTRAMUSCULAR; INTRAVENOUS at 01:11

## 2022-07-08 RX ADMIN — MULTIPLE VITAMINS W/ MINERALS TAB 1 TABLET: TAB at 09:26

## 2022-07-08 RX ADMIN — AMLODIPINE BESYLATE 5 MG: 5 TABLET ORAL at 09:04

## 2022-07-08 NOTE — OP NOTES
[facility]  OPERATIVE REPORT    Name:  Simone Smith  MR#:  [id number]  :  1955  ACCOUNT #:  [de-identified]  DATE OF SERVICE:  2022    PREOPERATIVE DIAGNOSIS:  End-stage osteoarthritis of left shoulder. POSTOPERATIVE DIAGNOSES:  1.  End-stage osteoarthritis of left shoulder. 2.  Tendinopathy of long head of the biceps. PROCEDURES PERFORMED:  1. Left shoulder total shoulder arthroplasty. 2.  Open biceps tenodesis, left shoulder. SURGEON:  Justyn Thomas MD    ASSISTANT:  SARAY Ly    ANESTHESIA:  General with an interscalene block. COMPLICATIONS:  Negative. SPECIMENS REMOVED:  Bone, discarded. IMPLANTS:  Exactech Equinoxe shoulder replacement, size 13 press-fit stem with a 4.5 replicator plate and a 53 short humeral head. The glenoid was a 8-degree posterior augment caged peg, size large glenoid. ESTIMATED BLOOD LOSS:  Less than 100 mL. IV FLUIDS:  Crystalloids given. PERIOPERATIVE MEDICINE:  2 g of Ancef. HISTORY:  The patient is a healthy 79-year-old gentleman who presented to my office with bilateral shoulder osteoarthritis. He has had a successful total shoulder replacement of the right. He presented back to the office in followup with increasing discomfort of the left. He had bone-on-bone arthritis of the left with osteophytic changes and extreme loss of range of motion. He had every indication to consider total shoulder replacement. He is obviously familiar with the technique. I refreshed his memory, talked to him once again about the risks such as but not limited to postoperative pain, numbness and tingling, swelling, bruising, loss of range of motion, revision surgery, DVT, PE, MI, CVA, and other unforeseen events that could occur in a perioperative fashion. The patient understood metal and plastic will be implanted to the body. The patient also understands physical therapy will be required post surgery.   Understanding all the risks and benefits as mentioned above, the patient wished to proceed, signed the consent, was taken to surgery. PROCEDURE:  The patient was taken back to surgery, placed supine on the operative table, administered general anesthetic with endotracheal intubation. He was placed into beach chair position. The left upper extremity was sterilely prepped and draped in the orthopedic fashion. 2 g of Ancef was administered intravenously. Following the sterile prep and drape, a time-out was performed and all parties agreed the left shoulder was the correct shoulder. Following the sterile prep, a 10-blade was used to create an anterior incision. The 10 blade was used to incise to the dermis. The knife and handle were passed off the table. Blunt dissection was taken down to the cephalic vein. The cephalic vein was then retracted laterally with the deltoid as the deltopectoral interval was exploited. Subdeltoid adhesions were released. A Blackman retractor was deployed. A small portion of the pectoralis was taken down. The rotator cuff revealed no evidence of rotator cuff tear. The circumflex vasculature was tied off with 2-0 Vicryl. Rotator cuff interval was opened with a pair of Metzenbaum scissors and two stay stitches of Ethibond were placed in the subscapularis. The long head of the biceps was evaluated. There was evidence of tendinopathy. A biceps tenodesis was performed using #2 FiberWire x2 in an anatomic position. Electrocautery was used to incise the subscapularis. Inferior dissection was performed along the inferior humeral head. Large osteophyte as anticipated was identified. The shoulder was then able to externally rotate further. Osteophytes were removed with rongeur and a quarter-inch curved osteotome. The entire inferior capsule was released and osteophytes were removed until the shoulder was able to be dislocated up into the wound.   The neck angle was created by the neck guide and the osteotomy was performed with a bone saw. Again all perimeter osteophytes were removed. The IM canal of the humerus opened with a reamer and dilated up to a size 13 and broached to a size 13. The broach was left in place with and a manhole cover was inserted. Shoulder was posteriorly dislocated. The subscapularis was released from its capsular attachments. The anterior glenoid retractor was deployed and the posterior glenoid retractor was deployed. The inferior capsule and labrum was released under direct vision. We then mounted the outrigger onto the coracoid. We chose an 8-degree posterior augment preoperatively based on the patient's CT scan. We referenced all bony landmarks and the computer went through its requisition. The opening drill hole was created on the glenoid. We then reamed appropriately. Satisfied with the ream, we then created the central ream.  The 8-degree peripheral drill sleeve was then placed and then peripheral drill holes were drilled. Bone stock was pulsatile irrigated. We trialed with size large and this was the correct size. Irrisept was used to rinse out the joint followed by pulse irrigation. 80 mg of gentamicin was injected into the inferior capsule. The long head of the biceps was released. Antibiotic-impregnated cement was mixed on the back table. The peripheral pegs were cemented and the center peg was bone grafted. The implant was inserted with 360 degrees of compression. Any excess cement was removed. After curing of the glenoid, the shoulder was brought back up into the wound. The broach was removed. We then press-fit the size 13 and used the graft from the humeral head as bone grafting. After the stem was in, we referenced the 4.5 replicator plate and a 53 short was the appropriate size. This was trialed and again it did match his anatomy. The shoulder was dislocated one more time. The final size 53 short was inserted. The shoulder was reduced. The wound was irrigated with Irrisept and pulse irrigation. All parties changed outer gloves. The subscapularis was repaired upon itself using #2 FiberWire. The pectoralis was also repaired using #2 FiberWire. #1 Vicryl was used to close the deltopectoral interval.  2-0 Vicryl was placed in the subcutaneous tissue. Running 4-0 Monocryl placed in the epidermis. Steri-Strips were applied over the incision. A sterile dry dressing was placed over the shoulder. The patient was placed in immobilizer. The patient was awakened from general anesthetic in stable condition. SARAY Costa, was available throughout the entirety of the case. She was integral in positioning the patient as well as holding retractor and closing the wound. She also was utilized to help transport the patient and apply the sling postoperatively.       Pedrito Restrepo MD      [id number]  D:  07/07/2022 10:56  T:  07/07/2022 20:20  JOB #:  [id number]

## 2022-07-08 NOTE — PROGRESS NOTES
Problem: Falls - Risk of  Goal: *Absence of Falls  Description: Document Julia Santa Fall Risk and appropriate interventions in the flowsheet.   Outcome: Progressing Towards Goal  Note: Fall Risk Interventions:  Mobility Interventions: Communicate number of staff needed for ambulation/transfer,PT Consult for mobility concerns,PT Consult for assist device competence,Patient to call before getting OOB         Medication Interventions: Evaluate medications/consider consulting pharmacy,Patient to call before getting OOB,Teach patient to arise slowly,Assess postural VS orthostatic hypotension    Elimination Interventions: Call light in reach,Elevated toilet seat,Stay With Me (per policy),Toilet paper/wipes in reach,Toileting schedule/hourly rounds,Urinal in reach              Problem: Upper Extremity Surgical Pathway: Day of Surgery  Goal: Activity/Safety  Outcome: Progressing Towards Goal  Goal: Diagnostic Test/Procedures  Outcome: Progressing Towards Goal  Goal: Nutrition/Diet  Outcome: Progressing Towards Goal  Goal: Medications  Outcome: Progressing Towards Goal  Goal: Respiratory  Outcome: Progressing Towards Goal  Goal: Treatments/Interventions/Procedures  Outcome: Progressing Towards Goal  Goal: Psychosocial  Outcome: Progressing Towards Goal  Goal: *Demonstrates progressive activity  Outcome: Progressing Towards Goal  Goal: *Optimal pain control at patient's stated goal  Outcome: Progressing Towards Goal

## 2022-07-08 NOTE — PROGRESS NOTES
Bedside and Verbal shift change report given to Tish Woods RN (oncoming nurse) by Good Abdalla RN (offgoing nurse). Report included the following information SBAR, Kardex, Procedure Summary, Intake/Output, MAR and Accordion.

## 2022-07-08 NOTE — PROGRESS NOTES
Occupational Therapy    Orders received, chart reviewed and patient evaluated by occupational therapy. Pending progression with skilled acute occupational therapy, recommend: To be determined: follow up with MD per protocol     Recommend with nursing ADLs with supervision/setup, OOB to chair 3x/day and toileting via functional mobility to and from bathroom standby assist and no DME. Thank you for completing as able in order to maintain patient strength, endurance and independence. Full evaluation to follow.      Thank you,  Ac Jones, OT

## 2022-07-08 NOTE — DISCHARGE SUMMARY
Ortho Discharge Summary    Patient ID:  Fabiana Alexander  [id number]  male  78 y.o.  1955    Admit date: 7/7/2022    Discharge date: 7/8/2022    Admitting Physician: Alvaro Deluca MD     Consulting Physician(s):   Treatment Team: Attending Provider: Sarah Aguilar MD; Utilization Review: Hickey Breath; Primary Nurse: Colin Kumar LPN; Occupational Therapist: Winsome Robin OT    Date of Surgery:   7/7/2022     Preoperative Diagnosis:  OSTEOARTHRITIS LEFT SHOULDER    Postoperative Diagnosis:   OSTEOARTHRITIS LEFT SHOULDER    Procedure(s):   LEFT TOTAL SHOULDER ARTHROPLASTY     Anesthesia Type:   General     Surgeon: Sarah Aguilar MD                            HPI:  Pt is a 78 y.o. male who has a history of OSTEOARTHRITIS LEFT SHOULDER  with pain and limitations of activities of daily living who presents at this time for a left LEFT TOTAL SHOULDER ARTHROPLASTY following the failure of conservative management. 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 : A BMI > 30 is classified as obesity and > 40 is classified as morbid obesity. Medications upon admission :   Prior to Admission Medications   Prescriptions Last Dose Informant Patient Reported? Taking? MULTIVIT-MIN/FOLIC/VIT K/LYCOP (ONE-A-DAY MEN'S MULTIVITAMIN PO) 6/30/2022 at Unknown time  Yes Yes   Sig: Take 1 Tab by mouth daily. amLODIPine (NORVASC) 10 mg tablet 7/7/2022 at Unknown time  Yes Yes   Sig: Take 5 mg by mouth daily.    aspirin delayed-release 81 mg tablet 7/6/2022 at Unknown time  Yes Yes   Sig: Take 81 mg by mouth daily. cholecalciferol (VITAMIN D3) 25 mcg (1,000 unit) cap 6/30/2022 at Unknown time  Yes Yes   Sig: Take 1,000 Units by mouth daily. clindamycin (CLEOCIN) 300 mg capsule Unknown at Unknown time  No No   Sig: Take 1 Capsule by mouth two (2) times a day. Patient is to take two po one hour before dental procedure  Patient is to take two po one hour after dental procedure   hydroCHLOROthiazide (HYDRODIURIL) 25 mg tablet 7/6/2022 at Unknown time  Yes Yes   Sig: Take 25 mg by mouth daily. levothyroxine (SYNTHROID) 100 mcg tablet 7/7/2022 at Unknown time  Yes Yes   Sig: Take 100 mcg by mouth Daily (before breakfast). losartan (COZAAR) 100 mg tablet 7/6/2022 at Unknown time  Yes Yes   Sig: Take 100 mg by mouth daily. pravastatin (PRAVACHOL) 20 mg tablet 7/7/2022 at Unknown time  Yes Yes   Sig: Take 20 mg by mouth daily. triamcinolone acetonide (KENALOG) 0.5 % ointment 7/6/2022 at Unknown time  Yes Yes   Sig: Apply  to affected area daily as needed for Skin Irritation. use thin layer      Facility-Administered Medications: None        Allergies: Allergies   Allergen Reactions    Pcn [Penicillins] Rash     Patient screened for any delayed non-IgE-mediated reaction to PCN.         Patient notes the following:    No delayed non-IgE-mediated reaction to PCN    Patient screened for any delayed non-IgE-mediated reaction to PCN.         Patient notes the following:    [facility]            Hospital Course: The patient underwent surgery. Complications:  None; patient tolerated the procedure well. Was taken to the PACU in stable condition and then transferred to the ortho floor. Post operative pain control achieved with scheduled tylenol, toradol and as needed oxycodone. Occupational therapy evaluation on POD 1 with clearance for discharge to home.        Perioperative Antibiotics:  Ancef     Postoperative Pain Management: Oxycodone & Tylenol    Postoperative transfusions:    Number of units banked PRBCs =   none     Post Op complications: none    Hemoglobin at discharge:    Lab Results   Component Value Date/Time    HGB 15.4 06/30/2022 09:57 AM    INR 1.0 06/11/2020 12:39 PM       Dressing was changed on POD # 1. Incision - clean, dry and intact. No significant erythema or swelling. Wound appears to be healing without any evidence of infection. Neurovascular exam found to be within normal limits. Discharged to: Home. Condition on Discharge:   stable    Discharge instructions:  - Take pain medications as prescribed  - Resume pre hospital diet      - Discharge activity: activity as tolerated  - Ambulate with assistive device as needed. - Weight bearing status: sling in place except for exercises and changing  - Wound Care Keep wound clean and dry. See discharge instruction sheet. -DISCHARGE MEDICATION LIST     Current Discharge Medication List      START taking these medications    Details   oxyCODONE IR (Roxicodone) 5 mg immediate release tablet Take 2 Tablets by mouth every six (6) hours as needed for Pain for up to 3 days. Max Daily Amount: 40 mg.  Qty: 40 Tablet, Refills: 0  Start date: 7/7/2022, End date: 7/10/2022    Associated Diagnoses: Primary osteoarthritis of left shoulder         CONTINUE these medications which have NOT CHANGED    Details   losartan (COZAAR) 100 mg tablet Take 100 mg by mouth daily. hydroCHLOROthiazide (HYDRODIURIL) 25 mg tablet Take 25 mg by mouth daily. cholecalciferol (VITAMIN D3) 25 mcg (1,000 unit) cap Take 1,000 Units by mouth daily. amLODIPine (NORVASC) 10 mg tablet Take 5 mg by mouth daily. triamcinolone acetonide (KENALOG) 0.5 % ointment Apply  to affected area daily as needed for Skin Irritation. use thin layer      aspirin delayed-release 81 mg tablet Take 81 mg by mouth daily.       levothyroxine (SYNTHROID) 100 mcg tablet Take 100 mcg by mouth Daily (before breakfast). pravastatin (PRAVACHOL) 20 mg tablet Take 20 mg by mouth daily. MULTIVIT-MIN/FOLIC/VIT K/LYCOP (ONE-A-DAY MEN'S MULTIVITAMIN PO) Take 1 Tab by mouth daily.          STOP taking these medications       clindamycin (CLEOCIN) 300 mg capsule Comments:   Reason for Stopping:            per medical continuation form      -Follow up in office in 1-2 weeks with Dr. Michael Del Cid      Signed:  Colbert Lundborg, NP  Orthopaedic Nurse Practitioner    7/8/2022  9:42 AM

## 2022-07-08 NOTE — PROGRESS NOTES
Ortho NP Note    POD# 1  s/p LEFT TOTAL SHOULDER ARTHROPLASTY   Pt seen in room with wife    Pt seated in chair eating breakfast.  Continues to deny pain at present; used oxycodone last evening   No complaints. VSS Afebrile. Visit Vitals  /76 (BP 1 Location: Right upper arm, BP Patient Position: Semi fowlers)   Pulse 71   Temp 98 °F (36.7 °C)   Resp 16   Ht 5' 9\" (1.753 m)   Wt 127.2 kg (280 lb 6.8 oz)   SpO2 95%   BMI 41.41 kg/m²       Voiding status: spontaneous void   Output (mL)  Urine Voided: 300 ml (07/07/22 1944)      Labs    Lab Results   Component Value Date/Time    HGB 15.4 06/30/2022 09:57 AM      Lab Results   Component Value Date/Time    INR 1.0 06/11/2020 12:39 PM      Lab Results   Component Value Date/Time    Sodium 138 06/30/2022 09:57 AM    Potassium 4.1 06/30/2022 09:57 AM    Chloride 102 06/30/2022 09:57 AM    CO2 29 06/30/2022 09:57 AM    Glucose 94 06/30/2022 09:57 AM    BUN 17 06/30/2022 09:57 AM    Creatinine 1.01 06/30/2022 09:57 AM    Calcium 9.3 06/30/2022 09:57 AM     Recent Glucose Results: No results found for: GLU, GLUPOC, GLUCPOC       PLAN:  I have reviewed progress note and am in agreement with assessment and plan as documented by Dr Divya Landaverde. In preparation for discharged, reviewed the following in room with patient. 1) PT: OT evaluation pending; continue sling use at home with therapy starting after follow up visit. 2) Pain - Multimodal approach including cryotherapy, scheduled tylenol, toradol with PRN oxycodone while in hospital.  To be discharged with oxycodone rx--all medications at [facility] per patient request.  Discussed precautions for narcotic use: avoid driving, ETOH, other sedating medications. 3) Wound care: Dressing teaching provided to patient/wife, supplies given  4) Post Op Care: Encouraged IS use. Follow up in 1-2 weeks with Dr. Divya Landaverde.      5) Readniess for discharge:      [x] Vital Signs stable    [x] + Voiding    [x] Wound intact, drainage minimal    [x] Tolerating PO intake     [] Cleared by PT (OT if applicable)     [] Stair training completed (if applicable)    [] Independent / Contact Guard Assist (household distance)     [] Bed mobility     [] Car transfers     [] ADLs    [x] Adequate pain control on oral medication alone     Discharge to home pending OT clearance, pain control.      Malik Noyola NP  Available via [employer]

## 2022-07-08 NOTE — PROGRESS NOTES
OCCUPATIONAL THERAPY EVALUATION/DISCHARGE  Patient: Jamari Thomason (1 y.o. male)  Date: 7/8/2022  Primary Diagnosis: Osteoarthritis [M19.90]  Osteoarthritis of left shoulder, unspecified osteoarthritis type [M19.012]  Procedure(s) (LRB):  LEFT TOTAL SHOULDER ARTHROPLASTY (Left) 1 Day Post-Op   Precautions: Total Shoulder       ASSESSMENT  Based on the objective data described below, the patient presents with decreased full body bathing/dressing, with no proximal AROM to LUE; however, he is demonstrating a high level of safe functional independence given he is post-op day 1 for L total shoulder arthroplasty. Pt's wife present, with both parties demonstrating good understanding of modified dressing techniques, sling management, pendulum exercises, as well as, AROM at elbow, wrist and digits. Pt noted with rash at L armpit, with NP notified/following and pt educated on importance of keeping armpit dry prior to following up with dermatologist.  Pt and wife verbalize readiness for discharge, with all questions/concerns answered and pt thanking therapist for his efforts. Current Level of Function (ADLs/self-care): Up to Max A    Functional Outcome Measure: The patient scored 70/100 on the Barthel Index outcome measure. Other factors to consider for discharge: none     PLAN :  Recommendation for discharge: (in order for the patient to meet his/her long term goals)  To be determined: Per MD protocol    This discharge recommendation:  Has been made in collaboration with the attending provider and/or case management    IF patient discharges home will need the following DME: none       SUBJECTIVE:   Patient stated Thanks for your help, I got way more information this time than when I had my other shoulder done, this was great. 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     Past Surgical History:   Procedure Laterality Date    COLONOSCOPY N/A 10/23/2020    COLONOSCOPY performed by Lorenzo Castaneda MD at [facility]  10/23/2020         HX ADENOIDECTOMY  CHILD    HX COLONOSCOPY  2014    HX KNEE REPLACEMENT Left     HX MALIGNANT SKIN LESION EXCISION Right 05/2020    ear, melanoma    HX SHOULDER REPLACEMENT Right 06/2020    HX THYROIDECTOMY  12/2006    pt unsure if parathyroid or thyroid    HX TONSILLECTOMY  CHILD    HX WISDOM TEETH EXTRACTION         Prior Level of Function/Environment/Context: Independent  Expanded or extensive additional review of patient history:     Home Situation  # Steps to Enter: 2  Rails to Enter: Yes  Hand Rails : Right  Patient Expects to be Discharged to[de-identified] Home with home health  Tub or Shower Type: Shower (with fixed bench)    Hand dominance: Right    EXAMINATION OF PERFORMANCE DEFICITS:  Cognitive/Behavioral Status:  Neurologic State: Alert  Orientation Level: Oriented X4  Cognition: Appropriate decision making; Appropriate for age attention/concentration; Appropriate safety awareness  Perception: Appears intact  Perseveration: No perseveration noted  Safety/Judgement: Awareness of environment;Home safety; Insight into deficits    Hearing: Auditory  Auditory Impairment: Hard of hearing, left side    Range of Motion:  AROM: Within functional limits (aside from LUE- No proximal AROM)  PROM: Within functional limits (aside from LUE)                      Strength:  Strength:  Within functional limits (aside from LUE)                Coordination:  Coordination: Within functional limits (aside from LUE)  Fine Motor Skills-Upper: Left Intact; Right Intact    Gross Motor Skills-Upper: Left Impaired;Right Intact (LUE No proximal AROM)    Tone & Sensation:  Tone: Normal  Sensation: Intact                      Balance:  Sitting: Intact; Without support  Standing: Intact; Without support    Functional Mobility and Transfers for ADLs:  Bed Mobility:  Supine to Sit: Modified independent  Scooting: Modified independent    Transfers:  Sit to Stand: Modified independent  Stand to Sit: Modified independent  Bed to Chair: Modified independent  Bathroom Mobility: Modified independent  Toilet Transfer : Modified independent    ADL Assessment:  Feeding: Setup    Oral Facial Hygiene/Grooming: Minimum assistance    Bathing: Maximum assistance    Upper Body Dressing: Maximum assistance    Lower Body Dressing: Moderate assistance    Toileting: Minimum assistance    ADL Intervention and task modifications:  Patient instructed and demonstrated shoulder precautions during ADLs with Min verbal cues. Patient instructed and indicated Excellent understanding propping surgical arm on surface, can back away from arm in order to access axilla to wash, dry, and deodorize. Patient instructed and demonstrated dress LUE first/undress last with Maximum assistance. Patient instructed and demonstrated compensatory strategies to don sling with Maximum assistance. Cognitive Retraining  Safety/Judgement: Awareness of environment;Home safety; Insight into deficits    Therapeutic Exercise:    EXERCISE   Sets   Reps   Active Active Assist   Passive   Comments      []                          []                          []                                []                          []                          []                             Elbow flexion/extension 1 5 [x]                          []                          []                             Wrist flexion/extension 1 5 [x]                          []                          [] Finger flexion/extension 1 5 [x]                          []                          []                             Pendulum    1 5 [x]     []     []     Min cues for technique     Functional Measure:    Barthel Index:  Bathin  Bladder: 10  Bowels: 10  Groomin  Dressin  Feedin  Mobility: 15  Stairs: 10  Toilet Use: 5  Transfer (Bed to Chair and Back): 15  Total: 70/100      The Barthel ADL Index: Guidelines  1. The index should be used as a record of what a patient does, not as a record of what a patient could do. 2. The main aim is to establish degree of independence from any help, physical or verbal, however minor and for whatever reason. 3. The need for supervision renders the patient not independent. 4. A patient's performance should be established using the best available evidence. Asking the patient, friends/relatives and nurses are the usual sources, but direct observation and common sense are also important. However direct testing is not needed. 5. Usually the patient's performance over the preceding 24-48 hours is important, but occasionally longer periods will be relevant. 6. Middle categories imply that the patient supplies over 50 per cent of the effort. 7. Use of aids to be independent is allowed. Score Interpretation (from [address] [address])    Independent   60-79 Minimally independent   40-59 Partially dependent   20-39 Very dependent   <20 Totally dependent     -Roberto Singer., Barthel, D.W. (1965). Functional evaluation: the Barthel Index. [address] [facility] ([address] (1997). The Barthel activities of daily living index: self-reporting versus actual performance in the old (> or = 75 years). Journal of [address] [place] [address] 45(7), [facility], BOYDPandaF, Bradley Stahl., [facility]. ().  Measuring the change in disability after inpatient rehabilitation; comparison of the responsiveness of the Barthel Index and Functional Chandler Measure. Journal of Neurology, Neurosurgery, and Psychiatry, 66(4), 316-273. MANSOOR Lind.ABY, SANTIAGO Quarles, & Alfred Gonzalez M.A. (2004) Assessment of post-stroke quality of life in cost-effectiveness studies: The usefulness of the Barthel Index and the EuroQoL-5D. Quality of Life Research, 15, 232-     Occupational Therapy Evaluation Charge Determination   History Examination Decision-Making   LOW Complexity : Brief history review  LOW Complexity : 1-3 performance deficits relating to physical, cognitive , or psychosocial skils that result in activity limitations and / or participation restrictions  LOW Complexity : No comorbidities that affect functional and no verbal or physical assistance needed to complete eval tasks       Based on the above components, the patient evaluation is determined to be of the following complexity level: LOW   Pain Ratin/10 LUE; RN/NP aware and following    Activity Tolerance:   Good    After treatment patient left in no apparent distress:    Sitting in chair, Call bell within reach and Caregiver / family present    COMMUNICATION/EDUCATION:   The patients plan of care was discussed with: Registered nurse, Case management and NP.      Thank you for this referral.  Brando Cross, DIONISIO  Time Calculation: 53 mins

## 2022-07-08 NOTE — PROGRESS NOTES
POD 1 Day Post-Op    Procedure:  Procedure(s):  LEFT TOTAL SHOULDER ARTHROPLASTY    Subjective:     Patient doing well. Objective:     Blood pressure (!) 151/66, pulse 80, temperature 97.6 °F (36.4 °C), resp. rate 16, height 5' 9\" (1.753 m), weight 280 lb 6.8 oz (127.2 kg), SpO2 95 %. Temp (24hrs), Av °F (36.7 °C), Min:97.5 °F (36.4 °C), Max:98.5 °F (36.9 °C)      Physical Exam:  Examination of the left shoulder reveals that the incision is clean, dry and intact. Sensation is intact to light touch.  mild swelling and ecchymosis. no calf pain. NVI.  Moving all digits    Labs:   Lab Results   Component Value Date/Time    HGB 15.4 2022 09:57 AM         Assessment:     Active Problems:    Osteoarthritis (2022)      Osteoarthritis of left shoulder (2022)        Plan/Recommendations/Medical Decision Making:   OT this am   Home today

## 2022-07-14 ENCOUNTER — OFFICE VISIT (OUTPATIENT)
Dept: ORTHOPEDIC SURGERY | Age: 67
End: 2022-07-14
Payer: MEDICARE

## 2022-07-14 DIAGNOSIS — Z96.612 STATUS POST TOTAL SHOULDER REPLACEMENT, LEFT: ICD-10-CM

## 2022-07-14 DIAGNOSIS — Z96.612 HISTORY OF TOTAL SHOULDER REPLACEMENT, LEFT: Primary | ICD-10-CM

## 2022-07-14 PROCEDURE — 99024 POSTOP FOLLOW-UP VISIT: CPT | Performed by: ORTHOPAEDIC SURGERY

## 2022-07-14 NOTE — LETTER
7/14/2022    Patient: Ariel Burciaga   YOB: 1955   Date of Visit: 7/14/2022     Kelly Quinonez MD  [place] [address]  Via Fax: [phone]    Dear Kelly Quinonez MD,      Thank you for referring Mr. Ariel Burciaga to [facility] for evaluation. My notes for this consultation are attached. If you have questions, please do not hesitate to call me. I look forward to following your patient along with you.       Sincerely,    Hoda Palmer MD

## 2022-07-14 NOTE — PROGRESS NOTES
Bennett Grubbs (: 1955) is a 78 y.o. male, patient, here for evaluation of the following chief complaint(s):  Shoulder Pain (S/P left total shoulder arthroplasty)       HPI:    Patient presents the office now status post total shoulder replacement on the left. He is doing very well. His pain is controlled. He denies numbness or tingling. He is here today with his wife. Allergies   Allergen Reactions    Pcn [Penicillins] Rash     Patient screened for any delayed non-IgE-mediated reaction to PCN.         Patient notes the following:    No delayed non-IgE-mediated reaction to PCN    Patient screened for any delayed non-IgE-mediated reaction to PCN.         Patient notes the following:    No delayed non-IgE-mediated reaction to PCN           Current Outpatient Medications   Medication Sig    losartan (COZAAR) 100 mg tablet Take 100 mg by mouth daily.  hydroCHLOROthiazide (HYDRODIURIL) 25 mg tablet Take 25 mg by mouth daily.  cholecalciferol (VITAMIN D3) 25 mcg (1,000 unit) cap Take 1,000 Units by mouth daily.  amLODIPine (NORVASC) 10 mg tablet Take 5 mg by mouth daily.  triamcinolone acetonide (KENALOG) 0.5 % ointment Apply  to affected area daily as needed for Skin Irritation. use thin layer    aspirin delayed-release 81 mg tablet Take 81 mg by mouth daily.  levothyroxine (SYNTHROID) 100 mcg tablet Take 100 mcg by mouth Daily (before breakfast).  pravastatin (PRAVACHOL) 20 mg tablet Take 20 mg by mouth daily.  MULTIVIT-MIN/FOLIC/VIT K/LYCOP (ONE-A-DAY MEN'S MULTIVITAMIN PO) Take 1 Tab by mouth daily. No current facility-administered medications for this visit.        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        Past Surgical History:   Procedure Laterality Date    COLONOSCOPY N/A 10/23/2020    COLONOSCOPY performed by Alla Sharp MD at [address]  10/23/2020         HX ADENOIDECTOMY  CHILD    HX COLONOSCOPY  2014    HX KNEE REPLACEMENT Left     HX MALIGNANT SKIN LESION EXCISION Right 05/2020    ear, melanoma    HX SHOULDER REPLACEMENT Right 06/2020    HX THYROIDECTOMY  12/2006    pt unsure if parathyroid or thyroid    HX TONSILLECTOMY  CHILD    HX WISDOM TEETH EXTRACTION         Family History   Problem Relation Age of Onset    Hypertension Mother     Cancer Father         MULT.  MYELOMA    Diabetes Maternal Grandmother     Cancer Paternal Grandfather         LUNG CA    Anesth Problems Neg Hx     Asthma Neg Hx     Heart Disease Neg Hx         Social History     Socioeconomic History    Marital status:      Spouse name: Not on file    Number of children: Not on file    Years of education: Not on file    Highest education level: Not on file   Occupational History    Not on file   Tobacco Use    Smoking status: Never Smoker    Smokeless tobacco: Never Used   Vaping Use    Vaping Use: Never used   Substance and Sexual Activity    Alcohol use: Not Currently    Drug use: No    Sexual activity: Not on file   Other Topics Concern     Service Not Asked    Blood Transfusions Not Asked    Caffeine Concern Not Asked    Occupational Exposure Not Asked    Hobby Hazards Not Asked    Sleep Concern Not Asked    Stress Concern Not Asked    Weight Concern Not Asked    Special Diet Not Asked    Back Care Not Asked    Exercise Not Asked    Bike Helmet Not Asked   [address] Not Asked    Self-Exams Not Asked   Social History Narrative    Not on file     Social Determinants of Health Financial Resource Strain:     Difficulty of Paying Living Expenses: Not on file   Food Insecurity:     Worried About Running Out of Food in the Last Year: Not on file    Earlene of Food in the Last Year: Not on file   Transportation Needs:     Lack of Transportation (Medical): Not on file    Lack of Transportation (Non-Medical): Not on file   Physical Activity:     Days of Exercise per Week: Not on file    Minutes of Exercise per Session: Not on file   Stress:     Feeling of Stress : Not on file   Social Connections:     Frequency of Communication with Friends and Family: Not on file    Frequency of Social Gatherings with Friends and Family: Not on file    Attends Druze Services: Not on file    Active Member of [address] [place] [address] or Organizations: Not on file    Attends Club or Organization Meetings: Not on file    Marital Status: Not on file   Intimate Partner Violence:     Fear of Current or Ex-Partner: Not on file    Emotionally Abused: Not on file    Physically Abused: Not on file    Sexually Abused: Not on file   Housing Stability:     Unable to Pay for Housing in the Last Year: Not on file    Number of [place] in the Last Year: Not on file    Unstable Housing in the Last Year: Not on file       Review of Systems   Musculoskeletal:        Post op left shoulder        Vitals: There were no vitals taken for this visit. There is no height or weight on file to calculate BMI. Ortho Exam     Shoulder: Incision showing good signs of healing mild ecchymosis noted along the bicep region. He has full range of motion of the elbow. Neurovascular examination is intact. XR Results (most recent):  Results from Appointment encounter on 07/14/22    XR SHOULDER LT AP/LAT MIN 2 V    Narrative  Three-view x-ray of the left shoulder reveals a well-seated prosthesis no lucencies identified       ASSESSMENT/PLAN:    Patient is doing very well. I have gone over management of his incision.   Have gone over the utilization of his sling. He was provided a prescription for physical therapy. Patient is to return to the office in 4 weeks.         Anatoliy Dean MD

## 2022-07-20 ENCOUNTER — OFFICE VISIT (OUTPATIENT)
Dept: ORTHOPEDIC SURGERY | Age: 67
End: 2022-07-20
Payer: MEDICARE

## 2022-07-20 DIAGNOSIS — M25.612 SHOULDER JOINT STIFFNESS, LEFT: ICD-10-CM

## 2022-07-20 DIAGNOSIS — Z96.612 HISTORY OF TOTAL SHOULDER REPLACEMENT, LEFT: ICD-10-CM

## 2022-07-20 DIAGNOSIS — M25.512 ACUTE PAIN OF LEFT SHOULDER: Primary | ICD-10-CM

## 2022-07-20 DIAGNOSIS — Z96.612 STATUS POST TOTAL SHOULDER REPLACEMENT, LEFT: ICD-10-CM

## 2022-07-20 DIAGNOSIS — M19.012 PRIMARY OSTEOARTHRITIS OF LEFT SHOULDER: ICD-10-CM

## 2022-07-20 PROCEDURE — 97110 THERAPEUTIC EXERCISES: CPT | Performed by: PHYSICAL THERAPIST

## 2022-07-20 PROCEDURE — 97161 PT EVAL LOW COMPLEX 20 MIN: CPT | Performed by: PHYSICAL THERAPIST

## 2022-07-20 NOTE — PROGRESS NOTES
Patient Name: Rk Freeman  Date:2022  : 1955  [x]  Patient  Verified  Payor: Reba Clarke / Plan: [place] MED[place]ARE PART [place] & B / Product Type: Medicare /      Total Treatment Time (min): 35 MIN   Total Timed Codes (min): 35    Visit #: 1 of 20    Shoulder evaluation   Referring Provider: Renata Hughes MD  Treatment Area: Left shoulder    Subjective: The patient is a 78 y.o. male referred to physical therapy by   Renata Hughes MD with a diagnosis of left shoulder pain and functional loss due to osteoarthritis the patient is now almost 2 weeks status post left total shoulder arthroplasty. The patient is well-known to our clinic he has had both knees replaced and other orthopedic related activities  Past Surgical History:   Procedure Laterality Date    COLONOSCOPY N/A 10/23/2020    COLONOSCOPY performed by Amor Zaidi MD at [address]  10/23/2020         HX ADENOIDECTOMY  CHILD    HX COLONOSCOPY      HX KNEE REPLACEMENT Left     HX MALIGNANT SKIN LESION EXCISION Right 2020    ear, melanoma    HX SHOULDER REPLACEMENT Right 2020    HX THYROIDECTOMY  2006    pt unsure if parathyroid or thyroid    HX TONSILLECTOMY  CHILD    HX WISDOM TEETH EXTRACTION       Past Medical History:   Diagnosis Date    Arthritis     At risk for sleep apnea 10/19/2020    during phone assessment with PAT, ALLEN score 5    Cancer ([facility] [address]     MELANOMA RIGHT EAR    Chronic pain     KNEES, SHOULDERS    GERD (gastroesophageal reflux disease)     High cholesterol     Hypertension     Hypothyroid     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 hydroCHLOROthiazide (HYDRODIURIL) 25 mg tablet Take 25 mg by mouth daily.       cholecalciferol (VITAMIN D3) 25 mcg (1,000 unit) cap Take 1,000 Units by mouth daily. amLODIPine (NORVASC) 10 mg tablet Take 5 mg by mouth daily. triamcinolone acetonide (KENALOG) 0.5 % ointment Apply  to affected area daily as needed for Skin Irritation. use thin layer      aspirin delayed-release 81 mg tablet Take 81 mg by mouth daily. levothyroxine (SYNTHROID) 100 mcg tablet Take 100 mcg by mouth Daily (before breakfast). pravastatin (PRAVACHOL) 20 mg tablet Take 20 mg by mouth daily. MULTIVIT-MIN/FOLIC/VIT K/LYCOP (ONE-A-DAY MEN'S MULTIVITAMIN PO) Take 1 Tab by mouth daily. No current facility-administered medications on file prior to visit. Objective:    General: Well-nourished well-developed. Alert and oriented. No acute distress. Normal affect and mood. Pleasant on exam.     Inspection:  Skin intact left shoulder. Increased swelling over the anterior and subdeltoid region. He has Steri-Strips in place incision is dry healing well there are no signs of infection  Active range of motion:   Active range of motion of the shoulder was deferred secondary to postoperative course  Internal rotation -   External rotation - °  Forward flexion - °  Lateral abduction -°  Wrist, hand and finger mobility is normal.    Strength:    Is able to initiate active contractile response at least 1/5 at all shoulder motions however formalized strength testing was not completed secondary to his postoperative course    Passive range of motion:  External rotation-maintain at 0 for the next 2 weeks secondary to postoperative course@ 30° /scapular plane-   Internal rotation -20 degrees@ 30° /scapular plane-  Forward flexion -90 degrees  Lateral abduction -80 degrees    Arthrokinematics/joint mobility  restriction in the glenohumeral joint, posterior and inferior primary. Graded 1- on Kaltenborn scale. Pain: VAS scale:    1-3/10 at rest.    4/10 with exercise and use.     Posture:  Patient has MODERATE scapular elevation, protraction of the scapulothoracic joint. Special tests:     AIN, PIN, ulnar motor grossly intact. Palpable radial pulse. Neurologic testing-he is neurovascularly intact in the upper extremity  Spurling sign is negative    Palpation:  TENDER TO PALPATION AROUND THE SOFT TISSUE OF UPPER TRAP LEVATOR AND DELTOID REGION. Shoulder Pain and Disability Index:  40%    Pain dysfunction pain  56%    Disability dysfunction. 50%    Total Spadi score      Treatment:  Evaluation of the involved shoulder-20 minutes. Therapeutic exercise 15 MIN. PT Exercise Log         Activity/Exercise  All exercises were supervised with verbal and tactile cues provided were necessary to perform the exercises with 100% accuracy 7/20/2022     PENDULUM x      TABLE SLIDES x     SCAP RETRACTION   x   PROM    x        Assessment:  The patient is a pleasant 54-year-old gentleman who is now approximately 2 weeks status post left total shoulder arthroplasty. He is recovering well continues to utilize the sling has functional deficits with range of motion and strength function and pain he is an excellent candidate to progress with a course of therapy over the next 3 months to return him to prior level of function. As prior level of function includes daily activities with reaching above shoulder height and caring for grandchildren. ICD-10-CM ICD-9-CM    1. Acute pain of left shoulder  M25.512 719.41       2. Primary osteoarthritis of left shoulder  M19.012 715.11       3. Shoulder joint stiffness, left  M25.612 719.51       4. Status post total shoulder replacement, left  Z96.612 V43.61               Physical therapy goals:      Long-term goals 12 weeks  1. The patient with active range of motion above shoulder height to allow for independence with all aspects of ADLs. 2.  The patient reports pain to be 0/10 with functional ADLs.   3.  The patient will demonstrate posture with good scapular retraction and depression without substitution during active range of motion. 4. 15% improvement in shoulder pain and disability index    Short-term goal 4 weeks. 1.  Improve passive range of motion by 30% from baseline values. 2.  Reduce pain by 50% from baseline reports. 3.  The patient will demonstrate independence with home exercise program to facilitate recovery. Physical therapy plan of care:      Treatment frequency 1-2X weekly. Duration 20 visits. Focus of therapy will be on progressive restoration of range of motion and strength, balance, and functional mobility. Therapeutic applications will include but are not limited to:  Home exercise program development and implementation with updating as needed. Intramuscular dry needling to the involved region. Manual therapy, joint mobilization, myofascial release, therapeutic exercises. Modalities including ultrasound and electric stimulation heat and ice. Kinesiotape and Elias taping for joint reeducation and approximation of tissue for neuromuscular reeducation. Stephy Madrigal, PT    7/20/2022      The referring physician has reviewed and approved this evaluation and plan of care as noted by the electronic signature attached to note.

## 2022-07-25 ENCOUNTER — OFFICE VISIT (OUTPATIENT)
Dept: ORTHOPEDIC SURGERY | Age: 67
End: 2022-07-25
Payer: MEDICARE

## 2022-07-25 DIAGNOSIS — M25.512 ACUTE PAIN OF LEFT SHOULDER: Primary | ICD-10-CM

## 2022-07-25 DIAGNOSIS — Z96.612 STATUS POST TOTAL SHOULDER REPLACEMENT, LEFT: ICD-10-CM

## 2022-07-25 DIAGNOSIS — M19.012 PRIMARY OSTEOARTHRITIS OF LEFT SHOULDER: ICD-10-CM

## 2022-07-25 DIAGNOSIS — M25.612 SHOULDER JOINT STIFFNESS, LEFT: ICD-10-CM

## 2022-07-25 PROCEDURE — 97110 THERAPEUTIC EXERCISES: CPT

## 2022-07-25 PROCEDURE — 97140 MANUAL THERAPY 1/> REGIONS: CPT

## 2022-07-25 NOTE — PROGRESS NOTES
PT DAILY TREATMENT NOTE    Patient Name: Nannette Whitfield  Date:2022  : 1955  [x]  Patient  Verified  Payor: Jeanie Collazo / Plan: [place] MED[place] / Product Type: Medicare /    Total Treatment Time (min): 30  1:1 Treatment Time ( [address] only): 40   Referring Provider: Eliazar Clark MD    1. Acute pain of left shoulder  2. Primary osteoarthritis of left shoulder  3. Shoulder joint stiffness, left  4. Status post total shoulder replacement, left      SUBJECTIVE  Subjective functional status/changes:   [] No changes reported    Patient reports his shoulder feels pretty good with HEP. OBJECTIVE/TREATMENT    Manual Therapy x 20 mins:   Manual posterior and inferior glenohumeral mobilizations and PROM for glenohumeral mobility and flexibility all planes to decrease joint restrictions and increase range of motion. LLPS for internal/external rotation with humeral head stabilization. Neuromuscular Re-education (NMR) x   minutes:  []  Kinesiotaping for   []  Neuromuscular reeducation to the VMO with use of [place] electrical stimulation in conjunction with active contraction and exercises. []  balance/proprioceptive exercises and activities in clinic as listed below as NMR. Therapeutic Exercise x 10 mins:   Strengthening/Endurance/ADL function/Neuromuscular reeducation activities/exercises supervised and completed as listed below.       EXERCISE X= completed on  2022   pulleys stand   Ball roll at table x   Scap rows x                                                                       Added/Changed Exercises:  []  Advanced to address: [] functional strength/ROM deficits [] balance/proprioceptive tasks  []  Modified: [] per subjective reports [] for patient time constraints [] for clinic time constraints    Modality:  []  E-Stim: type _ x _ min     []att   []unatt   []w/ice   []w/heat  []  Ultrasound: []cont   []pulse    _ W/cm2 x _  min   []1MHz   []3MHz  [x]  Ice pack: post      [x]  Hot pack: pre    []  Other:     Patient Education: [] Review HEP    [] Progressed/Changed HEP to include:  [] positioning   [] body mechanics   [] transfers   [] heat/ice application      ASSESSMENT    Good mobility for this point in rehab timeline. Pain levels are well controlled.     Progress towards goals / Updated goals:    PLAN  [x]  Upgrade activities as tolerated      [x]  Continue plan of care  []  Discharge due to:  [] Other:      Co-treatment by Art Heal, PTA 7/25/2022

## 2022-07-27 ENCOUNTER — OFFICE VISIT (OUTPATIENT)
Dept: ORTHOPEDIC SURGERY | Age: 67
End: 2022-07-27
Payer: MEDICARE

## 2022-07-27 DIAGNOSIS — M25.612 SHOULDER JOINT STIFFNESS, LEFT: ICD-10-CM

## 2022-07-27 DIAGNOSIS — M19.012 PRIMARY OSTEOARTHRITIS OF LEFT SHOULDER: Primary | ICD-10-CM

## 2022-07-27 DIAGNOSIS — Z96.612 STATUS POST TOTAL SHOULDER REPLACEMENT, LEFT: ICD-10-CM

## 2022-07-27 DIAGNOSIS — M25.512 ACUTE PAIN OF LEFT SHOULDER: ICD-10-CM

## 2022-07-27 PROCEDURE — 97110 THERAPEUTIC EXERCISES: CPT | Performed by: PHYSICAL THERAPIST

## 2022-07-27 PROCEDURE — 97140 MANUAL THERAPY 1/> REGIONS: CPT | Performed by: PHYSICAL THERAPIST

## 2022-07-27 NOTE — PROGRESS NOTES
PT DAILY TREATMENT NOTE    Patient Name: Nilsa Essex  Date:2022  : 1955  [x]  Patient  Verified  Payor: Maryam Medici / Plan: [place] MEDICARE PART A & B / Product Type: Medicare /    Total Treatment Time (min): 60  1:1 Treatment Time ( [address] only):  40  Referring Provider: Diana Moore MD    1. Primary osteoarthritis of left shoulder  2. Acute pain of left shoulder  3. Status post total shoulder replacement, left  4. Shoulder joint stiffness, left      SUBJECTIVE  Subjective functional status/changes:   [] No changes reported  Patient reports he still has expected soreness but finds himself doing light ADLs around the house with arm guarded out of the sling. OBJECTIVE/TREATMENT    Manual Therapy x 25 mins:   Manual posterior and inferior glenohumeral mobilizations and PROM for glenohumeral mobility and flexibility all planes to decrease joint restrictions and increase range of motion while patient supine and sidelying. LLPS for internal/external rotation with humeral head stabilization within TSR protocol. Myofascial release techniques to subscap/infraspinatus/posterior capsule with patient in sidelying. Neuromuscular Re-education (NMR) x   minutes:  []  Kinesiotaping for   []  Neuromuscular reeducation to the VMO with use of [place] electrical stimulation in conjunction with active contraction and exercises. []  balance/proprioceptive exercises and activities in clinic as listed below as NMR. Therapeutic Exercise x 15 mins:   Strengthening/Endurance/ADL function/Neuromuscular reeducation activities/exercises supervised and completed as listed below.       EXERCISE X= completed on  2022   pulleys stand   Ball roll at table x   Scap rows org                                                                       Added/Changed Exercises:  []  Advanced to address: [] functional strength/ROM deficits [] balance/proprioceptive tasks  []  Modified: [] per subjective reports [] for patient time constraints [] for clinic time constraints    Modality:  []  E-Stim: type _ x _ min     []att   []unatt   []w/ice   []w/heat  []  Ultrasound: []cont   []pulse    _ W/cm2 x _  min   []1MHz   []3MHz  [x]  Ice pack: post      [x]  Hot pack: pre    []  Other:     Patient Education: [] Review HEP    [] Progressed/Changed HEP to include:  [] positioning   [] body mechanics   [] transfers   [] heat/ice application      ASSESSMENT    Appropriate soreness but passive mobility is gradually improving with early plan of care.     PLAN  [x]  Upgrade activities as tolerated      [x]  Continue plan of care  []  Discharge due to:  [] Other:       Rosario Jo, PT, DPT 7/27/2022

## 2022-08-01 NOTE — PROGRESS NOTES
I have reviewed the notes, assessments, and/or procedures performed by Nolberto Shahid PTA, I concur with her/his documentation of Talya Else.

## 2022-08-02 ENCOUNTER — OFFICE VISIT (OUTPATIENT)
Dept: ORTHOPEDIC SURGERY | Age: 67
End: 2022-08-02
Payer: MEDICARE

## 2022-08-02 DIAGNOSIS — M25.512 ACUTE PAIN OF LEFT SHOULDER: ICD-10-CM

## 2022-08-02 DIAGNOSIS — M25.612 SHOULDER JOINT STIFFNESS, LEFT: ICD-10-CM

## 2022-08-02 DIAGNOSIS — M19.012 PRIMARY OSTEOARTHRITIS OF LEFT SHOULDER: Primary | ICD-10-CM

## 2022-08-02 DIAGNOSIS — Z96.612 STATUS POST TOTAL SHOULDER REPLACEMENT, LEFT: ICD-10-CM

## 2022-08-02 PROCEDURE — 97110 THERAPEUTIC EXERCISES: CPT

## 2022-08-02 PROCEDURE — 97140 MANUAL THERAPY 1/> REGIONS: CPT

## 2022-08-02 NOTE — PROGRESS NOTES
I have reviewed the notes, assessments, and/or procedures performed by Abhishek Singh PTA, I concur with her/his documentation of Treva Silver.

## 2022-08-02 NOTE — PROGRESS NOTES
PT DAILY TREATMENT NOTE    Patient Name: Bennett Grubbs  Date:2022  : 1955  [x]  Patient  Verified  Payor: William Cantu / Plan: [place] MEDICARE PART A & B / Product Type: Medicare /    Total Treatment Time (min): 60  1:1 Treatment Time ( [address] only):  40  Referring Provider: Javy Elkins MD    1. Primary osteoarthritis of left shoulder  2. Acute pain of left shoulder  3. Status post total shoulder replacement, left  4. Shoulder joint stiffness, left      SUBJECTIVE  Subjective functional status/changes:   [] No changes reported    Patient reports shoulder has been feeling good since adding over door pulleys at home. OBJECTIVE/TREATMENT    Manual Therapy x 25 mins:   Manual posterior and inferior glenohumeral mobilizations and PROM for glenohumeral mobility and flexibility all planes to decrease joint restrictions and increase range of motion while patient supine and sidelying. LLPS for internal/external rotation with humeral head stabilization within TSR protocol. Neuromuscular Re-education (NMR) x   minutes:  []  Kinesiotaping for   []  Neuromuscular reeducation to the VMO with use of [place] electrical stimulation in conjunction with active contraction and exercises. []  balance/proprioceptive exercises and activities in clinic as listed below as NMR. Therapeutic Exercise x 15 mins:   Strengthening/Endurance/ADL function/Neuromuscular reeducation activities/exercises supervised and completed as listed below.       EXERCISE X= completed on  2022   pulleys stand   Ball roll at table x   Scap rows org                                                                       Added/Changed Exercises:  []  Advanced to address: [] functional strength/ROM deficits [] balance/proprioceptive tasks  []  Modified: [] per subjective reports [] for patient time constraints [] for clinic time constraints    Modality:  []  E-Stim: type _ x _ min     []att   []unatt   []w/ice   []w/heat  []  Ultrasound: []cont   []pulse    _ W/cm2 x _  min   []1MHz   []3MHz  [x]  Ice pack: post      [x]  Hot pack: pre    []  Other:     Patient Education: [] Review HEP    [] Progressed/Changed HEP to include:  [] positioning   [] body mechanics   [] transfers   [] heat/ice application      ASSESSMENT    Doing well for ~3 weeks post-op. Cuing for scapular positioning with exercises.      PLAN  [x]  Upgrade activities as tolerated      [x]  Continue plan of care  []  Discharge due to:  [] Other:       Shawn Villeda, PTA 8/2/2022

## 2022-08-04 ENCOUNTER — OFFICE VISIT (OUTPATIENT)
Dept: ORTHOPEDIC SURGERY | Age: 67
End: 2022-08-04
Payer: MEDICARE

## 2022-08-04 DIAGNOSIS — M19.012 PRIMARY OSTEOARTHRITIS OF LEFT SHOULDER: Primary | ICD-10-CM

## 2022-08-04 DIAGNOSIS — M25.512 ACUTE PAIN OF LEFT SHOULDER: ICD-10-CM

## 2022-08-04 DIAGNOSIS — Z96.612 STATUS POST TOTAL SHOULDER REPLACEMENT, LEFT: ICD-10-CM

## 2022-08-04 PROCEDURE — 97110 THERAPEUTIC EXERCISES: CPT

## 2022-08-04 PROCEDURE — 97140 MANUAL THERAPY 1/> REGIONS: CPT

## 2022-08-04 NOTE — PROGRESS NOTES
PT DAILY TREATMENT NOTE    Patient Name: Bennett Grubbs  Date:2022  : 1955  [x]  Patient  Verified  Payor: William Cantu / Plan: [place] MEDICARE PART A & B / Product Type: Medicare /    Total Treatment Time (min): 60  1:1 Treatment Time ( [address] only):  30  Referring Provider: Javy Elkins MD    1. Primary osteoarthritis of left shoulder  2. Acute pain of left shoulder  3. Status post total shoulder replacement, left      SUBJECTIVE  Subjective functional status/changes:   [] No changes reported    Patient reports he shoulder feels really good and he would like to progress HEP. OBJECTIVE/TREATMENT    Manual Therapy x 15 mins:   Manual posterior and inferior glenohumeral mobilizations and PROM for glenohumeral mobility and flexibility all planes to decrease joint restrictions and increase range of motion while patient supine and sidelying. LLPS for internal/external rotation with humeral head stabilization within TSR protocol. Neuromuscular Re-education (NMR) x   minutes:  []  Kinesiotaping for   []  Neuromuscular reeducation to the VMO with use of [place] electrical stimulation in conjunction with active contraction and exercises. []  balance/proprioceptive exercises and activities in clinic as listed below as NMR. Therapeutic Exercise x 15 mins:   Strengthening/Endurance/ADL function/Neuromuscular reeducation activities/exercises supervised and completed as listed below.       EXERCISE X= completed on  2022   pulleys stand   Ball roll at table x   Scap rows org                                                                       Added/Changed Exercises:  []  Advanced to address: [] functional strength/ROM deficits [] balance/proprioceptive tasks  []  Modified: [] per subjective reports [] for patient time constraints [] for clinic time constraints    Modality:  []  E-Stim: type _ x _ min     []att   []unatt   []w/ice   []w/heat  []  Ultrasound: []cont   []pulse    _ W/cm2 x _  min   []1MHz []3MHz  [x]  Ice pack: post      [x]  Hot pack: pre    []  Other:     Patient Education: [] Review HEP    [x] Progressed/Changed HEP to include: supine AAROM elevation with pipe  [] positioning   [] body mechanics   [] transfers   [] heat/ice application      ASSESSMENT    Making steady improvements but still need to be careful not overdo it as he is ~4 wks post-op.     PLAN  [x]  Upgrade activities as tolerated      [x]  Continue plan of care  []  Discharge due to:  [] Other:       Andrés Erazo, PTA 8/4/2022

## 2022-08-05 NOTE — PROGRESS NOTES
I have reviewed the notes, assessments, and/or procedures performed by Justus Hinson PTA, I concur with her/his documentation of Jose A Aguilar.

## 2022-08-08 ENCOUNTER — OFFICE VISIT (OUTPATIENT)
Dept: ORTHOPEDIC SURGERY | Age: 67
End: 2022-08-08
Payer: MEDICARE

## 2022-08-08 DIAGNOSIS — Z96.612 STATUS POST TOTAL SHOULDER REPLACEMENT, LEFT: ICD-10-CM

## 2022-08-08 DIAGNOSIS — M19.012 PRIMARY OSTEOARTHRITIS OF LEFT SHOULDER: Primary | ICD-10-CM

## 2022-08-08 DIAGNOSIS — M25.612 SHOULDER JOINT STIFFNESS, LEFT: ICD-10-CM

## 2022-08-08 DIAGNOSIS — M25.512 ACUTE PAIN OF LEFT SHOULDER: ICD-10-CM

## 2022-08-08 PROCEDURE — 97110 THERAPEUTIC EXERCISES: CPT

## 2022-08-08 PROCEDURE — 97140 MANUAL THERAPY 1/> REGIONS: CPT

## 2022-08-08 NOTE — PROGRESS NOTES
I have reviewed the notes, assessments, and/or procedures performed by Palmira Moritz PTA, I concur with her/his documentation of Tramaine Crews.

## 2022-08-08 NOTE — PROGRESS NOTES
PT DAILY TREATMENT NOTE    Patient Name: Dave Malik  Date:2022  : 1955  [x]  Patient  Verified  Payor: Estephania Nair / Plan: [place] MEDICARE PART A & B / Product Type: Medicare /    Total Treatment Time (min): 50  1:1 Treatment Time ( [address] only):  50  Referring Provider: Romaine Cleary MD    1. Primary osteoarthritis of left shoulder  2. Acute pain of left shoulder  3. Status post total shoulder replacement, left  4. Shoulder joint stiffness, left    SUBJECTIVE  Subjective functional status/changes:   [] No changes reported    Patient reports his shoulder feels better each day. He is still being careful to avoid aggravating movements with it. OBJECTIVE/TREATMENT    Manual Therapy x  20 mins:   Manual posterior and inferior glenohumeral mobilizations and PROM for glenohumeral mobility and flexibility all planes to decrease joint restrictions and increase range of motion while patient supine and sidelying. LLPS for internal/external rotation with humeral head stabilization within TSR protocol. Neuromuscular Re-education (NMR) x   minutes:  []  Kinesiotaping for   []  Neuromuscular reeducation to the VMO with use of [place] electrical stimulation in conjunction with active contraction and exercises. []  balance/proprioceptive exercises and activities in clinic as listed below as NMR. Therapeutic Exercise x 30 mins:   Strengthening/Endurance/ADL function/Neuromuscular reeducation activities/exercises supervised and completed as listed below. EXERCISE X= completed on  2022   pulleys Stand/sit   Ball roll   TG   Scap rows grn   Iso.  Ant/post delt org                                                                   Added/Changed Exercises:  [x]  Advanced to address: [x] functional strength/ROM deficits [] balance/proprioceptive tasks  []  Modified: [] per subjective reports [] for patient time constraints [] for clinic time constraints    Modality:  []  E-Stim: type _ x _ min     []att []unatt   []w/ice   []w/heat  []  Ultrasound: []cont   []pulse    _ W/cm2 x _  min   []1MHz   []3MHz  [x]  Ice pack: post      []  Hot pack: pre    []  Other:     Patient Education: [] Review HEP    [] Progressed/Changed HEP to include: supine AAROM elevation with pipe  [] positioning   [] body mechanics   [] transfers   [] heat/ice application      ASSESSMENT    Shoulder joint is stabilizing. Doing well with HEP and interventions here in the clinic.      PLAN  [x]  Upgrade activities as tolerated      [x]  Continue plan of care  []  Discharge due to:  [] Other:       Shilpa Hernandez, PTA 8/8/2022

## 2022-08-10 ENCOUNTER — OFFICE VISIT (OUTPATIENT)
Dept: ORTHOPEDIC SURGERY | Age: 67
End: 2022-08-10
Payer: MEDICARE

## 2022-08-10 DIAGNOSIS — M25.512 ACUTE PAIN OF LEFT SHOULDER: ICD-10-CM

## 2022-08-10 DIAGNOSIS — Z96.612 STATUS POST TOTAL SHOULDER REPLACEMENT, LEFT: ICD-10-CM

## 2022-08-10 DIAGNOSIS — M19.012 PRIMARY OSTEOARTHRITIS OF LEFT SHOULDER: Primary | ICD-10-CM

## 2022-08-10 DIAGNOSIS — M25.612 SHOULDER JOINT STIFFNESS, LEFT: ICD-10-CM

## 2022-08-10 PROCEDURE — 97140 MANUAL THERAPY 1/> REGIONS: CPT

## 2022-08-10 PROCEDURE — 97110 THERAPEUTIC EXERCISES: CPT

## 2022-08-10 NOTE — PROGRESS NOTES
I have reviewed the notes, assessments, and/or procedures performed by Wang Mcdonnell PTA, I concur with her/his documentation of Indra Burdick.

## 2022-08-10 NOTE — PROGRESS NOTES
PT DAILY TREATMENT NOTE    Patient Name: Byrd Sandifer  Date:8/10/2022  : 1955  [x]  Patient  Verified  Payor: Jovita Dockery / Plan: [place] MEDICARE PART A & B / Product Type: Medicare /    Total Treatment Time (min): 55  1:1 Treatment Time ( [address] only):  30  Referring Provider: Bronson Leal MD    1. Primary osteoarthritis of left shoulder  2. Acute pain of left shoulder  3. Status post total shoulder replacement, left  4. Shoulder joint stiffness, left    SUBJECTIVE  Subjective functional status/changes:   [] No changes reported    Patient reports he was able to drive today. OBJECTIVE/TREATMENT    Manual Therapy x  20 mins:   Manual posterior and inferior glenohumeral mobilizations and PROM for glenohumeral mobility and flexibility all planes to decrease joint restrictions and increase range of motion while patient supine and sidelying. LLPS for internal/external rotation with humeral head stabilization within TSR protocol. Neuromuscular Re-education (NMR) x   minutes:  []  Kinesiotaping for   []  Neuromuscular reeducation to the VMO with use of [place] electrical stimulation in conjunction with active contraction and exercises. []  balance/proprioceptive exercises and activities in clinic as listed below as NMR. Therapeutic Exercise x 10 mins:   Strengthening/Endurance/ADL function/Neuromuscular reeducation activities/exercises supervised and completed as listed below. EXERCISE X= completed on  8/10/2022   pulleys Stand/sit   Ball roll   TG   Scap rows grn   Iso.  Ant/post delt org                                                                   Added/Changed Exercises:  []  Advanced to address: [] functional strength/ROM deficits [] balance/proprioceptive tasks  []  Modified: [] per subjective reports [] for patient time constraints [] for clinic time constraints    Modality:  []  E-Stim: type _ x _ min     []att   []unatt   []w/ice   []w/heat  []  Ultrasound: []cont   []pulse    _ W/cm2 x _  min   []1MHz   []3MHz  [x]  Ice pack: post      []  Hot pack: pre    []  Other:     Patient Education: [] Review HEP    [] Progressed/Changed HEP to include: supine AAROM elevation with pipe  [] positioning   [] body mechanics   [] transfers   [] heat/ice application      ASSESSMENT    Making steady improvements in shoulder mobility. Progress exercises next week as appropriate for 6 wks post-op.      PLAN  [x]  Upgrade activities as tolerated      [x]  Continue plan of care  []  Discharge due to:  [] Other:       Jace Canela, PTA 8/10/2022

## 2022-08-11 ENCOUNTER — OFFICE VISIT (OUTPATIENT)
Dept: ORTHOPEDIC SURGERY | Age: 67
End: 2022-08-11
Payer: MEDICARE

## 2022-08-11 DIAGNOSIS — Z96.612 STATUS POST TOTAL SHOULDER REPLACEMENT, LEFT: ICD-10-CM

## 2022-08-11 DIAGNOSIS — M19.012 PRIMARY OSTEOARTHRITIS OF LEFT SHOULDER: Primary | ICD-10-CM

## 2022-08-11 PROCEDURE — 99024 POSTOP FOLLOW-UP VISIT: CPT | Performed by: ORTHOPAEDIC SURGERY

## 2022-08-11 NOTE — PROGRESS NOTES
Brian Castaneda (: 1955) is a 78 y.o. male, patient, here for evaluation of the following chief complaint(s):  Shoulder Pain (Patient here for post op left total shoulder arthroplasty.)       HPI:    Patient presents to the office status post total shoulder replacement left. He is doing very well. His pain is controlled. He has been progressing with physical therapy. Allergies   Allergen Reactions    Pcn [Penicillins] Rash     Patient screened for any delayed non-IgE-mediated reaction to PCN. Patient notes the following:    No delayed non-IgE-mediated reaction to PCN    Patient screened for any delayed non-IgE-mediated reaction to PCN. Patient notes the following:    No delayed non-IgE-mediated reaction to PCN           Current Outpatient Medications   Medication Sig    losartan (COZAAR) 100 mg tablet Take 100 mg by mouth daily. hydroCHLOROthiazide (HYDRODIURIL) 25 mg tablet Take 25 mg by mouth daily. cholecalciferol (VITAMIN D3) 25 mcg (1,000 unit) cap Take 1,000 Units by mouth daily. amLODIPine (NORVASC) 10 mg tablet Take 5 mg by mouth daily. triamcinolone acetonide (KENALOG) 0.5 % ointment Apply  to affected area daily as needed for Skin Irritation. use thin layer    aspirin delayed-release 81 mg tablet Take 81 mg by mouth daily. levothyroxine (SYNTHROID) 100 mcg tablet Take 100 mcg by mouth Daily (before breakfast). pravastatin (PRAVACHOL) 20 mg tablet Take 20 mg by mouth daily. MULTIVIT-MIN/FOLIC/VIT K/LYCOP (ONE-A-DAY MEN'S MULTIVITAMIN PO) Take 1 Tab by mouth daily. No current facility-administered medications for this visit.        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  MYELOMA    Diabetes Maternal Grandmother     Cancer Paternal Grandfather         LUNG CA    Anesth Problems Neg Hx     Asthma Neg Hx     Heart Disease Neg Hx         Social History     Socioeconomic History    Marital status:      Spouse name: Not on file    Number of children: Not on file    Years of education: Not on file    Highest education level: Not on file   Occupational History    Not on file   Tobacco Use    Smoking status: Never    Smokeless tobacco: Never   Vaping Use    Vaping Use: Never used   Substance and Sexual Activity    Alcohol use: Not Currently    Drug use: No    Sexual activity: Not on file   Other Topics Concern     Service Not Asked    Blood Transfusions Not Asked    Caffeine Concern Not Asked    Occupational Exposure Not Asked    Hobby Hazards Not Asked    Sleep Concern Not Asked    Stress Concern Not Asked    Weight Concern Not Asked    Special Diet Not Asked    Back Care Not Asked    Exercise Not Asked    Bike Helmet Not Asked    Seat Belt Not Asked    Self-Exams Not Asked   Social History Narrative    Not on file     Social Determinants of Health     Financial Resource Strain: Not on file   Food Insecurity: Not on file   Transportation Needs: Not on file   Physical Activity: Not on file   Stress: Not on file   Social Connections: Not on file   Intimate Partner Violence: Not on file   Housing Stability: Not on file       Review of Systems   Musculoskeletal:         Post op left total shoulder arthroplasty     Vitals: There were no vitals taken for this visit. There is no height or weight on file to calculate BMI. Ortho Exam     Left shoulder: Incision is showing good signs of healing. There is no drainage. He has about 90 degrees of forward elevation, 80 degrees of lateral duction and 30 degrees of external rotation. Neurovascular examination is intact. ASSESSMENT/PLAN:    Patient is progressing well. Patient was provided a new prescription for physical therapy. I have gone over the restrictions moving forward. He is to remove the sling and I have gone over the restrictions.   Patient is to return to the office in 4 weeks        Shirley Salazar MD

## 2022-08-11 NOTE — LETTER
8/11/2022    Patient: Byrd Sandifer   YOB: 1955   Date of Visit: 8/11/2022     Roberta Pollard MD  [place] [address]  Via Fax: [phone]    Dear Roberta Pollard MD,      Thank you for referring Mr. Byrd Sandifer to [facility] for evaluation. My notes for this consultation are attached. If you have questions, please do not hesitate to call me. I look forward to following your patient along with you.       Sincerely,    Anish Ordoñez MD

## 2022-08-16 ENCOUNTER — OFFICE VISIT (OUTPATIENT)
Dept: ORTHOPEDIC SURGERY | Age: 67
End: 2022-08-16
Payer: MEDICARE

## 2022-08-16 DIAGNOSIS — Z96.612 STATUS POST TOTAL SHOULDER REPLACEMENT, LEFT: ICD-10-CM

## 2022-08-16 DIAGNOSIS — M19.012 PRIMARY OSTEOARTHRITIS OF LEFT SHOULDER: Primary | ICD-10-CM

## 2022-08-16 DIAGNOSIS — M25.512 ACUTE PAIN OF LEFT SHOULDER: ICD-10-CM

## 2022-08-16 PROCEDURE — 97140 MANUAL THERAPY 1/> REGIONS: CPT

## 2022-08-16 PROCEDURE — 97110 THERAPEUTIC EXERCISES: CPT

## 2022-08-16 NOTE — PROGRESS NOTES
PT DAILY TREATMENT NOTE    Patient Name: Talya Zepeda  Date:2022  : 1955  [x]  Patient  Verified  Payor: [employer] / Plan: [place] MEDICARE PART A & B / Product Type: Medicare /    Total Treatment Time (min): 55  1:1 Treatment Time ( [address] only):  40  Referring Provider: Leo Witt MD    1. Primary osteoarthritis of left shoulder  2. Status post total shoulder replacement, left  3. Acute pain of left shoulder    SUBJECTIVE  Subjective functional status/changes:   [] No changes reported    Patient reports MD is pleased with his progress to this point and approves more therapy. OBJECTIVE/TREATMENT    Manual Therapy x 15 mins:   Manual posterior and inferior glenohumeral mobilizations and PROM for glenohumeral mobility and flexibility all planes to decrease joint restrictions and increase range of motion while patient supine and sidelying. LLPS for internal/external rotation with humeral head stabilization within TSR protocol. Neuromuscular Re-education (NMR) x   minutes:  []  Kinesiotaping for   []  Neuromuscular reeducation to the VMO with use of [place] electrical stimulation in conjunction with active contraction and exercises. []  balance/proprioceptive exercises and activities in clinic as listed below as NMR. Therapeutic Exercise x 25 mins:   Strengthening/Endurance/ADL function/Neuromuscular reeducation activities/exercises supervised and completed as listed below.       EXERCISE X= completed on  2022   pulleys Stand/sit   Ball roll   TG   Scap rows grn   ant/post delt org   AAROM w/stick 0#   UBE 3' FWD                                                           Added/Changed Exercises:  [x]  Advanced to address: [x] functional strength/ROM deficits [] balance/proprioceptive tasks  []  Modified: [] per subjective reports [] for patient time constraints [] for clinic time constraints    Modality:  []  E-Stim: type _ x _ min     []att   []unatt   []w/ice   []w/heat  [] Ultrasound: []cont   []pulse    _ W/cm2 x _  min   []1MHz   []3MHz  [x]  Ice pack: post      []  Hot pack: pre    []  Other:     Patient Education: [] Review HEP    [] Progressed/Changed HEP to include: supine AAROM elevation with pipe  [] positioning   [] body mechanics   [] transfers   [] heat/ice application      ASSESSMENT    Appropriate challenge with today's exercise progression but no specific c/o pain. Doing well at this point in rehab timeline.      PLAN  [x]  Upgrade activities as tolerated      [x]  Continue plan of care  []  Discharge due to:  [] Other:       Abhishek Singh, PTA 8/16/2022

## 2022-08-16 NOTE — PROGRESS NOTES
I reviewed the notes, assessments, and/or procedures performed by Riddhi Wright PTA, I concur with her documentation of Kandy Villa

## 2022-08-18 ENCOUNTER — OFFICE VISIT (OUTPATIENT)
Dept: ORTHOPEDIC SURGERY | Age: 67
End: 2022-08-18
Payer: MEDICARE

## 2022-08-18 DIAGNOSIS — M25.512 ACUTE PAIN OF LEFT SHOULDER: ICD-10-CM

## 2022-08-18 DIAGNOSIS — Z96.612 STATUS POST TOTAL SHOULDER REPLACEMENT, LEFT: ICD-10-CM

## 2022-08-18 DIAGNOSIS — M25.612 SHOULDER JOINT STIFFNESS, LEFT: ICD-10-CM

## 2022-08-18 DIAGNOSIS — M19.012 PRIMARY OSTEOARTHRITIS OF LEFT SHOULDER: Primary | ICD-10-CM

## 2022-08-18 PROCEDURE — 97140 MANUAL THERAPY 1/> REGIONS: CPT

## 2022-08-18 PROCEDURE — 97110 THERAPEUTIC EXERCISES: CPT

## 2022-08-18 NOTE — PROGRESS NOTES
PT DAILY TREATMENT NOTE    Patient Name: Nannette Whitfield  Date:2022  : 1955  [x]  Patient  Verified  Payor: Jeanie Collazo / Plan: [place] / Product Type: Medicare /    Total Treatment Time (min): 55  1:1 Treatment Time ([address] only):  40  Referring Provider: Eliazar Clark MD    1. Primary osteoarthritis of left shoulder  2. Status post total shoulder replacement, left  3. Acute pain of left shoulder  4. Shoulder joint stiffness, left    SUBJECTIVE  Subjective functional status/changes:   [] No changes reported    Patient reports his shoulder felt good with exercise progression last visit. He states he is still being careful to not aggravate it at home. OBJECTIVE/TREATMENT    Manual Therapy x 15 mins:   Manual posterior and inferior glenohumeral mobilizations and PROM for glenohumeral mobility and flexibility all planes to decrease joint restrictions and increase range of motion while patient supine and sidelying. LLPS for internal/external rotation with humeral head stabilization within TSR protocol. Neuromuscular Re-education (NMR) x   minutes:  []  Kinesiotaping for   []  Neuromuscular reeducation to the VMO with use of [place] electrical stimulation in conjunction with active contraction and exercises. []  balance/proprioceptive exercises and activities in clinic as listed below as NMR. Therapeutic Exercise x 25 mins:   Strengthening/Endurance/ADL function/Neuromuscular reeducation activities/exercises supervised and completed as listed below.       EXERCISE X= completed on  2022   pulleys Stand/sit   Ball roll   TG   Scap rows grn   ant/post delt org   AAROM w/stick 0#   UBE 3' FWD   DAP bicep curls 5#                                                       Added/Changed Exercises:  [x]  Advanced to address: [x] functional strength/ROM deficits [] balance/proprioceptive tasks  []  Modified: [] per subjective reports [] for patient time constraints [] for clinic time constraints    Modality:  []  E-Stim: type _ x _ min     []att   []unatt   []w/ice   []w/heat  []  Ultrasound: []cont   []pulse    _ W/cm2 x _  min   []1MHz   []3MHz  [x]  Ice pack: post      []  Hot pack: pre    []  Other:     Patient Education: [] Review HEP    [] Progressed/Changed HEP to include: supine AAROM elevation with pipe  [] positioning   [] body mechanics   [] transfers   [] heat/ice application      ASSESSMENT    Continues to make steady improvements in shoulder mobility. Responding well to exercise progression here in the clinic.      PLAN  [x]  Upgrade activities as tolerated      [x]  Continue plan of care  []  Discharge due to:  [] Other:       Barbara Anthony, PTA 8/18/2022

## 2022-08-18 NOTE — PROGRESS NOTES
I reviewed the notes, assessments, and/or procedures performed by Caryl Molina PTA, I concur with her documentation of Johana Summers

## 2022-08-23 ENCOUNTER — OFFICE VISIT (OUTPATIENT)
Dept: ORTHOPEDIC SURGERY | Age: 67
End: 2022-08-23
Payer: MEDICARE

## 2022-08-23 DIAGNOSIS — Z96.612 STATUS POST TOTAL SHOULDER REPLACEMENT, LEFT: Primary | ICD-10-CM

## 2022-08-23 DIAGNOSIS — M19.012 PRIMARY OSTEOARTHRITIS OF LEFT SHOULDER: ICD-10-CM

## 2022-08-23 PROCEDURE — 97140 MANUAL THERAPY 1/> REGIONS: CPT | Performed by: PHYSICAL THERAPIST

## 2022-08-23 PROCEDURE — 97110 THERAPEUTIC EXERCISES: CPT | Performed by: PHYSICAL THERAPIST

## 2022-08-23 NOTE — PROGRESS NOTES
PT DAILY TREATMENT NOTE    Patient Name: Lucia Acosta  Date:2022  : 1955  [x]  Patient  Verified  Payor:  Jorge L / Plan: [place] MED[place] PART [place] / Product Type: Medicare /    Total Treatment Time (min): 55  1:1 Treatment Time ( [address] only):  30  Referring Provider: Bennett Badillo MD    1. Status post total shoulder replacement, left  2. Primary osteoarthritis of left shoulder    SUBJECTIVE  Subjective functional status/changes:   [] No changes reported  Shoulder feels good. Still with some generalized weakness during ADLs. OBJECTIVE/TREATMENT    Manual Therapy x 15 mins:   Manual posterior and inferior glenohumeral mobilizations and PROM for glenohumeral mobility and flexibility all planes to decrease joint restrictions and increase range of motion while patient supine LLPS for internal/external rotation with humeral head stabilization within TSR protocol. Neuromuscular Re-education (NMR) x   minutes:  []  Kinesiotaping for   []  Neuromuscular reeducation to the VMO with use of [place] electrical stimulation in conjunction with active contraction and exercises. []  balance/proprioceptive exercises and activities in clinic as listed below as NMR. Therapeutic Exercise x 15 mins:   Strengthening/Endurance/ADL function/Neuromuscular reeducation activities/exercises supervised and completed as listed below.       EXERCISE X= completed on  2022   pulleys Stand/sit   Ball roll   TG   Scap rows blue   ant/post delt org   AAROM w/stick 1#   UBE 3'/3'   DAP bicep curls 5#   RC walk peach   DAP ecc scap 5#                                             SPADI: 10%  Added/Changed Exercises:  [x]  Advanced to address: [x] functional strength/ROM deficits [] balance/proprioceptive tasks  []  Modified: [] per subjective reports [] for patient time constraints [] for clinic time constraints    Modality:  []  E-Stim: type _ x _ min     []att   []unatt   []w/ice   []w/heat  []  Ultrasound: []cont []pulse    _ W/cm2 x _  min   []1MHz   []3MHz  [x]  Ice pack: post      []  Hot pack: pre    []  Other:     Patient Education: [] Review HEP    [] Progressed/Changed HEP to include: supine AAROM elevation with pipe  [] positioning   [] body mechanics   [] transfers   [] heat/ice application      ASSESSMENT  Overall progressing well but still with some end range mobility restrictions in all directions that benefit from continued skilled PT.        PLAN  [x]  Upgrade activities as tolerated      [x]  Continue plan of care  []  Discharge due to:  [] Other:       Brittany Gonzalez, PT, DPT 8/23/2022

## 2022-08-25 ENCOUNTER — OFFICE VISIT (OUTPATIENT)
Dept: ORTHOPEDIC SURGERY | Age: 67
End: 2022-08-25
Payer: MEDICARE

## 2022-08-25 DIAGNOSIS — Z96.612 STATUS POST TOTAL SHOULDER REPLACEMENT, LEFT: Primary | ICD-10-CM

## 2022-08-25 DIAGNOSIS — M19.012 PRIMARY OSTEOARTHRITIS OF LEFT SHOULDER: ICD-10-CM

## 2022-08-25 DIAGNOSIS — M25.512 ACUTE PAIN OF LEFT SHOULDER: ICD-10-CM

## 2022-08-25 DIAGNOSIS — M25.612 SHOULDER JOINT STIFFNESS, LEFT: ICD-10-CM

## 2022-08-25 PROCEDURE — 97110 THERAPEUTIC EXERCISES: CPT | Performed by: PHYSICAL THERAPIST

## 2022-08-25 PROCEDURE — 97140 MANUAL THERAPY 1/> REGIONS: CPT | Performed by: PHYSICAL THERAPIST

## 2022-08-25 NOTE — PROGRESS NOTES
PT DAILY TREATMENT NOTE    Patient Name: Mark Husain  Date:2022  : 1955  [x]  Patient  Verified  Payor: Jose Hoyt / Plan: [place] MEDICARE PART A [place] B / Product Type: Medicare /    Total Treatment Time (min): 55  1:1 Treatment Time ([address] only):  30  Referring Provider: Erasmo Lambert MD    1. Status post total shoulder replacement, left  2. Primary osteoarthritis of left shoulder  3. Acute pain of left shoulder  4. Shoulder joint stiffness, left    SUBJECTIVE  Subjective functional status/changes:   [] No changes reported  Patient states he has noticed some soreness along the superior shoulder. OBJECTIVE/TREATMENT    Manual Therapy x 15 mins:   Manual posterior and inferior glenohumeral mobilizations and PROM for glenohumeral mobility and flexibility all planes to decrease joint restrictions and increase range of motion while patient supine LLPS for internal/external rotation with humeral head stabilization within TSR protocol. Neuromuscular Re-education (NMR) x   minutes:  []  Kinesiotaping for   []  Neuromuscular reeducation to the VMO with use of [place] electrical stimulation in conjunction with active contraction and exercises. []  balance/proprioceptive exercises and activities in clinic as listed below as NMR. Therapeutic Exercise x 15 mins:   Strengthening/Endurance/ADL function/Neuromuscular reeducation activities/exercises supervised and completed as listed below.       EXERCISE X= completed on  2022   pulleys Stand/sit   Ball roll   TG   Scap rows blue   ant/post delt org   AAROM w/stick 2#   UBE 3'/3' level 2.0   DAP bicep curls 5#   RC walk peach   DAP ecc scap 5#   BOW ABC y                                           Added/Changed Exercises:  [x]  Advanced to address: [x] functional strength/ROM deficits [] balance/proprioceptive tasks  []  Modified: [] per subjective reports [] for patient time constraints [] for clinic time constraints    Modality:  []  E-Stim: type _ x _ min     []att   []unatt   []w/ice   []w/heat  []  Ultrasound: []cont   []pulse    _ W/cm2 x _  min   []1MHz   []3MHz  [x]  Ice pack: post      []  Hot pack: pre    []  Other:     Patient Education: [] Review HEP    [] Progressed/Changed HEP to include: supine AAROM elevation with pipe  [] positioning   [] body mechanics   [] transfers   [] heat/ice application      ASSESSMENT    Appropriate fatigue with advancing exercise here in clinic today. Patient notes some soreness with endrange mobilization but feels it is helpful.     PLAN  [x]  Upgrade activities as tolerated      [x]  Continue plan of care  []  Discharge due to:  [] Other:       Arlet Castaneda, PT, DPT 8/25/2022

## 2022-08-30 ENCOUNTER — OFFICE VISIT (OUTPATIENT)
Dept: ORTHOPEDIC SURGERY | Age: 67
End: 2022-08-30
Payer: MEDICARE

## 2022-08-30 DIAGNOSIS — Z96.612 STATUS POST TOTAL SHOULDER REPLACEMENT, LEFT: Primary | ICD-10-CM

## 2022-08-30 DIAGNOSIS — M25.612 SHOULDER JOINT STIFFNESS, LEFT: ICD-10-CM

## 2022-08-30 DIAGNOSIS — M25.512 ACUTE PAIN OF LEFT SHOULDER: ICD-10-CM

## 2022-08-30 DIAGNOSIS — M19.012 PRIMARY OSTEOARTHRITIS OF LEFT SHOULDER: ICD-10-CM

## 2022-08-30 PROCEDURE — 97110 THERAPEUTIC EXERCISES: CPT | Performed by: PHYSICAL THERAPIST

## 2022-08-30 PROCEDURE — 97140 MANUAL THERAPY 1/> REGIONS: CPT | Performed by: PHYSICAL THERAPIST

## 2022-08-30 NOTE — PROGRESS NOTES
PT DAILY TREATMENT NOTE    Patient Name: Fabiana Alexander  Date:2022  : 1955  [x]  Patient  Verified  Payor: Akhil Browne / Plan: [place] MEDICARE PART A [place] B / Product Type: Medicare /    Total Treatment Time (min): 55  1:1 Treatment Time ([address] only):  30  Referring Provider: Sarah Aguilar MD    1. Status post total shoulder replacement, left  2. Primary osteoarthritis of left shoulder  3. Acute pain of left shoulder  4. Shoulder joint stiffness, left    SUBJECTIVE  Subjective functional status/changes:   [] No changes reported  Sill sore and weak but verbalizes increasing HEP with assistance of his daughter who is a PTA. OBJECTIVE/TREATMENT    Manual Therapy x 15 mins:   Manual posterior and inferior glenohumeral mobilizations and PROM for glenohumeral mobility and flexibility all planes to decrease joint restrictions and increase range of motion while patient supine LLPS for internal/external rotation with humeral head stabilization within TSR protocol. Neuromuscular Re-education (NMR) x   minutes:  []  Kinesiotaping for   []  Neuromuscular reeducation to the VMO with use of [place] electrical stimulation in conjunction with active contraction and exercises. []  balance/proprioceptive exercises and activities in clinic as listed below as NMR. Therapeutic Exercise x 15 mins:   Strengthening/Endurance/ADL function/Neuromuscular reeducation activities/exercises supervised and completed as listed below. EXERCISE X= completed on  2022   pulleys Stand/sit   Ball roll   TG   Scap rows blue   ant/post delt org   AAROM w/stick 2#   UBE 3'/3' level 2.0   DAP bicep curls 5#   RC walk org   DAP ecc scap 5#   BOW ABC y                                         Active elevation 130 degrees.     Added/Changed Exercises:  [x]  Advanced to address: [x] functional strength/ROM deficits [] balance/proprioceptive tasks  []  Modified: [] per subjective reports [] for patient time constraints [] for clinic time constraints    Modality:  []  E-Stim: type _ x _ min     []att   []unatt   []w/ice   []w/heat  []  Ultrasound: []cont   []pulse    _ W/cm2 x _  min   []1MHz   []3MHz  [x]  Ice pack: post      []  Hot pack: pre    []  Other:     Patient Education: [] Review HEP    [] Progressed/Changed HEP to include: supine AAROM elevation with pipe  [] positioning   [] body mechanics   [] transfers   [] heat/ice application      ASSESSMENT    Patient is improving ADL use and mobility as appropriate but still subjective soreness and expected weakness. Reports fatigue with exercise here in clinic today.     PLAN  [x]  Upgrade activities as tolerated      [x]  Continue plan of care  []  Discharge due to:  [] Other:       Laila Kelley, PT, DPT 8/30/2022

## 2022-09-01 ENCOUNTER — OFFICE VISIT (OUTPATIENT)
Dept: ORTHOPEDIC SURGERY | Age: 67
End: 2022-09-01
Payer: MEDICARE

## 2022-09-01 DIAGNOSIS — M25.512 ACUTE PAIN OF LEFT SHOULDER: ICD-10-CM

## 2022-09-01 DIAGNOSIS — M19.012 PRIMARY OSTEOARTHRITIS OF LEFT SHOULDER: ICD-10-CM

## 2022-09-01 DIAGNOSIS — Z96.612 STATUS POST TOTAL SHOULDER REPLACEMENT, LEFT: Primary | ICD-10-CM

## 2022-09-01 DIAGNOSIS — M25.612 SHOULDER JOINT STIFFNESS, LEFT: ICD-10-CM

## 2022-09-01 PROCEDURE — 97110 THERAPEUTIC EXERCISES: CPT | Performed by: PHYSICAL THERAPIST

## 2022-09-01 PROCEDURE — 97140 MANUAL THERAPY 1/> REGIONS: CPT | Performed by: PHYSICAL THERAPIST

## 2022-09-01 NOTE — PROGRESS NOTES
PT DAILY TREATMENT NOTE    Patient Name: Mary Butcher  [id number]  : 1955  [x]  Patient  Verified  Payor: Derek Roberts / Plan: [place] MEDICARE PART A [place] B / Product Type: Medicare /    Total Treatment Time (min): 55  1:1 Treatment Time ([address] only):  30  Referring Provider: Lisa Riley MD    1. Status post total shoulder replacement, left  2. Primary osteoarthritis of left shoulder  3. Shoulder joint stiffness, left  4. Acute pain of left shoulder    SUBJECTIVE  Subjective functional status/changes:   [] No changes reported  Feels a little tight along axilla/lateral border of scapula. OBJECTIVE/TREATMENT    Manual Therapy x 15 mins:   Manual posterior and inferior glenohumeral mobilizations and PROM for glenohumeral mobility and flexibility all planes to decrease joint restrictions and increase range of motion while patient supine/sidelying. LLPS for internal/external rotation with humeral head stabilization within TSR protocol. Scap stabilization with mobilization. Deep/superficial myofascial techniques to lateral scapula and subscap. Neuromuscular Re-education (NMR) x   minutes:  []  Kinesiotaping for   []  Neuromuscular reeducation to the VMO with use of [place] electrical stimulation in conjunction with active contraction and exercises. []  balance/proprioceptive exercises and activities in clinic as listed below as NMR. Therapeutic Exercise x 15 mins:   Strengthening/Endurance/ADL function/Neuromuscular reeducation activities/exercises supervised and completed as listed below. EXERCISE X= completed on  2022   pulleys Stand/sit   Ball roll   TG   Scap rows blue   ant/post delt org   AAROM w/stick 2#   UBE 3'/3' level 2.0   DAP bicep curls 5#   RC walk org   DAP ecc scap 5#   BOW ABC y                                         Active elevation 130 degrees.     Added/Changed Exercises:  [x]  Advanced to address: [x] functional strength/ROM deficits [] balance/proprioceptive tasks  []  Modified: [] per subjective reports [] for patient time constraints [] for clinic time constraints    Modality:  []  E-Stim: type _ x _ min     []att   []unatt   []w/ice   []w/heat  []  Ultrasound: []cont   []pulse    _ W/cm2 x _  min   []1MHz   []3MHz  [x]  Ice pack: post      []  Hot pack: pre    []  Other:     Patient Education: [] Review HEP    [] Progressed/Changed HEP to include: supine AAROM elevation with pipe  [] positioning   [] body mechanics   [] transfers   [] heat/ice application      ASSESSMENT    Patient reports shoulder feels \"much better\" after manual techniques. Still with appropriate fatigue during exercise.      PLAN  [x]  Upgrade activities as tolerated      [x]  Continue plan of care  []  Discharge due to:  [] Other:       Candelario Po, PT, DPT 9/1/2022

## 2022-09-06 ENCOUNTER — OFFICE VISIT (OUTPATIENT)
Dept: ORTHOPEDIC SURGERY | Age: 67
End: 2022-09-06
Payer: MEDICARE

## 2022-09-06 DIAGNOSIS — M19.012 PRIMARY OSTEOARTHRITIS OF LEFT SHOULDER: ICD-10-CM

## 2022-09-06 DIAGNOSIS — Z96.612 STATUS POST TOTAL SHOULDER REPLACEMENT, LEFT: Primary | ICD-10-CM

## 2022-09-06 DIAGNOSIS — M25.612 SHOULDER JOINT STIFFNESS, LEFT: ICD-10-CM

## 2022-09-06 PROCEDURE — 97140 MANUAL THERAPY 1/> REGIONS: CPT | Performed by: PHYSICAL THERAPIST

## 2022-09-06 PROCEDURE — 97110 THERAPEUTIC EXERCISES: CPT | Performed by: PHYSICAL THERAPIST

## 2022-09-06 NOTE — PROGRESS NOTES
PT DAILY TREATMENT NOTE    Patient Name: Matt Horvath  Date:2022  : 1955  [x]  Patient  Verified  Payor: Christo Peralta / Plan: [place] MEDICARE PART A & B / Product Type: Medicare /    Total Treatment Time (min): 55  1:1 Treatment Time ([address] only):  30  Referring Provider: Fadia Nava MD    1. Status post total shoulder replacement, left  2. Primary osteoarthritis of left shoulder  3. Shoulder joint stiffness, left    SUBJECTIVE  Subjective functional status/changes:   [] No changes reported   [place] looser after last visit. To see MD later in the week. OBJECTIVE/TREATMENT    Manual Therapy x 15 mins:   Manual posterior and inferior glenohumeral mobilizations and PROM for glenohumeral mobility and flexibility all planes to decrease joint restrictions and increase range of motion while patient supine/sidelying. LLPS for internal/external rotation with humeral head stabilization within TSR protocol. Scap stabilization with mobilization. Deep/superficial myofascial techniques to lateral scapula and subscap. Neuromuscular Re-education (NMR) x   minutes:  []  Kinesiotaping for   []  Neuromuscular reeducation to the VMO with use of [place] electrical stimulation in conjunction with active contraction and exercises. []  balance/proprioceptive exercises and activities in clinic as listed below as NMR. Therapeutic Exercise x 15 mins:   Strengthening/Endurance/ADL function/Neuromuscular reeducation activities/exercises supervised and completed as listed below.       EXERCISE X= completed on  2022   pulleys Stand/sit   Ball roll   TG   Scap rows blue   ant/post delt org   AAROM w/stick 3#   UBE 4'/4' level 2.0   DAP bicep curls 5#   RC walk org   DAP ecc scap 5#   BOW ABC y                                             Added/Changed Exercises:  [x]  Advanced to address: [x] functional strength/ROM deficits [] balance/proprioceptive tasks  []  Modified: [] per subjective reports [] for patient time constraints [] for clinic time constraints    Modality:  []  E-Stim: type _ x _ min     []att   []unatt   []w/ice   []w/heat  []  Ultrasound: []cont   []pulse    _ W/cm2 x _  min   []1MHz   []3MHz  [x]  Ice pack: post      []  Hot pack: pre    []  Other:     Patient Education: [] Review HEP    [] Progressed/Changed HEP to include: supine AAROM elevation with pipe  [] positioning   [] body mechanics   [] transfers   [] heat/ice application      ASSESSMENT  Still notes a little lateral scapula \"tightness\" but no complaints with increasing exercise and progressing with ADLs based on his subjective reports.         PLAN  [x]  Upgrade activities as tolerated      [x]  Continue plan of care  []  Discharge due to:  [] Other:       Kathy Kidd, PT, DPT 9/6/2022

## 2022-09-08 ENCOUNTER — OFFICE VISIT (OUTPATIENT)
Dept: ORTHOPEDIC SURGERY | Age: 67
End: 2022-09-08

## 2022-09-08 ENCOUNTER — OFFICE VISIT (OUTPATIENT)
Dept: ORTHOPEDIC SURGERY | Age: 67
End: 2022-09-08
Payer: MEDICARE

## 2022-09-08 DIAGNOSIS — M25.612 SHOULDER JOINT STIFFNESS, LEFT: ICD-10-CM

## 2022-09-08 DIAGNOSIS — Z96.612 STATUS POST TOTAL SHOULDER REPLACEMENT, LEFT: Primary | ICD-10-CM

## 2022-09-08 DIAGNOSIS — M19.012 PRIMARY OSTEOARTHRITIS OF LEFT SHOULDER: ICD-10-CM

## 2022-09-08 PROCEDURE — 99024 POSTOP FOLLOW-UP VISIT: CPT | Performed by: ORTHOPAEDIC SURGERY

## 2022-09-08 PROCEDURE — 97140 MANUAL THERAPY 1/> REGIONS: CPT

## 2022-09-08 PROCEDURE — 97110 THERAPEUTIC EXERCISES: CPT

## 2022-09-08 NOTE — LETTER
9/8/2022    Patient: Praveen Thayer   YOB: 1955   Date of Visit: 9/8/2022     Adrianna Guzman MD  [place] [address]  Via Fax: [phone]    Dear Adrianna Guzman MD,      Thank you for referring Mr. Praveen Thayer to [facility] for evaluation. My notes for this consultation are attached. If you have questions, please do not hesitate to call me. I look forward to following your patient along with you.       Sincerely,    Shirley Salazar MD

## 2022-09-08 NOTE — PROGRESS NOTES
PT DAILY TREATMENT NOTE    Patient Name: Byrd Sandifer  Date:2022  : 1955  [x]  Patient  Verified  Payor: Jovita Sarkis / Plan: [place] MEDICARE PART A & B / Product Type: Medicare /    Total Treatment Time (min): 55  1:1 Treatment Time ([address] only):  40  Referring Provider: Bronson Leal MD    1. Status post total shoulder replacement, left  2. Primary osteoarthritis of left shoulder  3. Shoulder joint stiffness, left    SUBJECTIVE  Subjective functional status/changes:   [] No changes reported    Patient reports he still feels \"tight\" and \"weak\" but denies any pain or significant ADL difficulty. OBJECTIVE/TREATMENT    Manual Therapy x 15 mins:   Manual posterior and inferior glenohumeral mobilizations and PROM for glenohumeral mobility and flexibility all planes to decrease joint restrictions and increase range of motion while patient supine/sidelying. LLPS for internal/external rotation with humeral head stabilization within TSR protocol. Scap stabilization with mobilization. Neuromuscular Re-education (NMR) x   minutes:  []  Kinesiotaping for   []  Neuromuscular reeducation to the VMO with use of [place] electrical stimulation in conjunction with active contraction and exercises. []  balance/proprioceptive exercises and activities in clinic as listed below as NMR. Therapeutic Exercise x 25 mins:   Strengthening/Endurance/ADL function/Neuromuscular reeducation activities/exercises supervised and completed as listed below.       EXERCISE X= completed on  2022   pulleys Stand/sit   Ball roll   TG   Scap rows blue   ant/post delt org   AAROM w/stick 3#   UBE 4'/4' level 2.0   DAP bicep curls 5#   RC AROM org   DAP ecc scap 5#   BOW ABC x                                             Added/Changed Exercises:  []  Advanced to address: [] functional strength/ROM deficits [] balance/proprioceptive tasks  []  Modified: [] per subjective reports [] for patient time constraints [] for clinic time constraints    Modality:  []  E-Stim: type _ x _ min     []att   []unatt   []w/ice   []w/heat  []  Ultrasound: []cont   []pulse    _ W/cm2 x _  min   []1MHz   []3MHz  [x]  Ice pack: post      []  Hot pack: pre    []  Other:     Patient Education: [] Review HEP    [] Progressed/Changed HEP to include: supine AAROM elevation with pipe  [] positioning   [] body mechanics   [] transfers   [] heat/ice application      ASSESSMENT    He is doing more around the house/yard with no shoulder pain. We will continue to progress strengthening exercises and end range mobility work her in the clinic to maximize functional gains.      PLAN  [x]  Upgrade activities as tolerated      [x]  Continue plan of care  []  Discharge due to:  [] Other:       Mary Conner, PTA 9/8/2022

## 2022-09-08 NOTE — PROGRESS NOTES
Rk Freeman (: 1955) is a 78 y.o. male, patient, here for evaluation of the following chief complaint(s):  Shoulder Pain (Shoulder post op)       HPI:    Patient returns to the office now status post total shoulder replacement of the left. Patient is doing well. He has been making progress with physical therapy. Allergies   Allergen Reactions    Pcn [Penicillins] Rash     Patient screened for any delayed non-IgE-mediated reaction to PCN. Patient notes the following:    No delayed non-IgE-mediated reaction to PCN    Patient screened for any delayed non-IgE-mediated reaction to PCN. Patient notes the following:    No delayed non-IgE-mediated reaction to PCN           Current Outpatient Medications   Medication Sig    losartan (COZAAR) 100 mg tablet Take 100 mg by mouth daily. hydroCHLOROthiazide (HYDRODIURIL) 25 mg tablet Take 25 mg by mouth daily. cholecalciferol (VITAMIN D3) 25 mcg (1,000 unit) cap Take 1,000 Units by mouth daily. amLODIPine (NORVASC) 10 mg tablet Take 5 mg by mouth daily. triamcinolone acetonide (KENALOG) 0.5 % ointment Apply  to affected area daily as needed for Skin Irritation. use thin layer    aspirin delayed-release 81 mg tablet Take 81 mg by mouth daily. levothyroxine (SYNTHROID) 100 mcg tablet Take 100 mcg by mouth Daily (before breakfast). pravastatin (PRAVACHOL) 20 mg tablet Take 20 mg by mouth daily. MULTIVIT-MIN/FOLIC/VIT K/LYCOP (ONE-A-DAY MEN'S MULTIVITAMIN PO) Take 1 Tab by mouth daily. No current facility-administered medications for this visit.        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  MYELOMA    Diabetes Maternal Grandmother     Cancer Paternal Grandfather         LUNG CA    Anesth Problems Neg Hx     Asthma Neg Hx     Heart Disease Neg Hx         Social History     Socioeconomic History    Marital status:      Spouse name: Not on file    Number of children: Not on file    Years of education: Not on file    Highest education level: Not on file   Occupational History    Not on file   Tobacco Use    Smoking status: Never    Smokeless tobacco: Never   Vaping Use    Vaping Use: Never used   Substance and Sexual Activity    Alcohol use: Not Currently    Drug use: No    Sexual activity: Not on file   Other Topics Concern     Service Not Asked    Blood Transfusions Not Asked    Caffeine Concern Not Asked    Occupational Exposure Not Asked    Hobby Hazards Not Asked    Sleep Concern Not Asked    Stress Concern Not Asked    Weight Concern Not Asked    Special Diet Not Asked    Back Care Not Asked    Exercise Not Asked    Bike Helmet Not Asked    Seat Belt Not Asked    Self-Exams Not Asked   Social History Narrative    Not on file     Social Determinants of Health     Financial Resource Strain: Not on file   Food Insecurity: Not on file   Transportation Needs: Not on file   Physical Activity: Not on file Stress: Not on file   Social Connections: Not on file   Intimate Partner Violence: Not on file   Housing Stability: Not on file       Review of Systems   Musculoskeletal:         Post op shoulder     Vitals: There were no vitals taken for this visit. There is no height or weight on file to calculate BMI. Ortho Exam     Left shoulder: Incision have healed. He has 140 degrees of forward elevation, 80 degrees of lateral duction and 40 degrees of external rotation. Passively still has a little bit of tightness. He has minimal pain. Neurovascular examination is intact    ASSESSMENT/PLAN:    Patient is making good progress. I encouraged him to continue with physical therapy.   He is also engaged in a home exercise program.  Patient is to return to the office in 4 weeks        Mariela Glass MD

## 2022-09-08 NOTE — PROGRESS NOTES
I have reviewed the notes, assessments, and/or procedures performed by Art Heal PTA, I concur with her/his documentation of Praveen Thayer.

## 2022-09-13 ENCOUNTER — OFFICE VISIT (OUTPATIENT)
Dept: ORTHOPEDIC SURGERY | Age: 67
End: 2022-09-13
Payer: MEDICARE

## 2022-09-13 DIAGNOSIS — M25.612 SHOULDER JOINT STIFFNESS, LEFT: ICD-10-CM

## 2022-09-13 DIAGNOSIS — Z96.612 STATUS POST TOTAL SHOULDER REPLACEMENT, LEFT: Primary | ICD-10-CM

## 2022-09-13 DIAGNOSIS — M19.012 PRIMARY OSTEOARTHRITIS OF LEFT SHOULDER: ICD-10-CM

## 2022-09-13 PROCEDURE — 97110 THERAPEUTIC EXERCISES: CPT

## 2022-09-13 PROCEDURE — 97140 MANUAL THERAPY 1/> REGIONS: CPT

## 2022-09-13 NOTE — PROGRESS NOTES
PT DAILY TREATMENT NOTE    Patient Name: Treva Silver  Date:2022  : 1955  [x]  Patient  Verified  Payor: Dorothy Downs / Plan: [place] MED[place] / Product Type: Medicare /    Total Treatment Time (min): 55  1:1 Treatment Time ([address] only): 30  Referring Provider: Bhargav Morocho MD    1. Status post total shoulder replacement, left  2. Primary osteoarthritis of left shoulder  3. Shoulder joint stiffness, left    SUBJECTIVE  Subjective functional status/changes:   [] No changes reported    Patient reports generally shoulder is feeling better each week. OBJECTIVE/TREATMENT    Manual Therapy x 15 mins:   Manual posterior and inferior glenohumeral mobilizations and PROM for glenohumeral mobility and flexibility all planes to decrease joint restrictions and increase range of motion while patient supine/sidelying. LLPS for internal/external rotation with humeral head stabilization within TSR protocol. Scap stabilization with mobilization. Neuromuscular Re-education (NMR) x   minutes:  []  Kinesiotaping for   []  Neuromuscular reeducation to the VMO with use of [place] electrical stimulation in conjunction with active contraction and exercises. []  balance/proprioceptive exercises and activities in clinic as listed below as NMR. Therapeutic Exercise x 15 mins:   Strengthening/Endurance/ADL function/Neuromuscular reeducation activities/exercises supervised and completed as listed below.       EXERCISE X= completed on  2022   pulleys Stand/sit   Ball roll   TG   Scap rows blue   ant/post delt org   AAROM w/stick 3#   UBE 4'/4' level 2.0   DAP bicep curls 5#   RC AROM org   DAP ecc scap 5#   BOW ABC x   BOW push ups x   DAP FWD press 5#                                 Added/Changed Exercises:  [x]  Advanced to address: [x] functional strength/ROM deficits [x] stabilization/proprioceptive tasks  []  Modified: [] per subjective reports [] for patient time constraints [] for clinic time constraints    Modality:  []  E-Stim: type _ x _ min     []att   []unatt   []w/ice   []w/heat  []  Ultrasound: []cont   []pulse    _ W/cm2 x _  min   []1MHz   []3MHz  [x]  Ice pack: post      []  Hot pack: pre    []  Other:     Patient Education: [] Review HEP    [] Progressed/Changed HEP to include:    [] positioning   [] body mechanics   [] transfers   [] heat/ice application      ASSESSMENT    Still with appropriate end range stiffness but no true pain with shoulder stretching. He did well with new strengthening exercises introduced today.     PLAN  [x]  Upgrade activities as tolerated      [x]  Continue plan of care  []  Discharge due to:  [] Other:       Pedrito Benitez, VALERIO 9/13/2022

## 2022-09-15 ENCOUNTER — OFFICE VISIT (OUTPATIENT)
Dept: ORTHOPEDIC SURGERY | Age: 67
End: 2022-09-15
Payer: MEDICARE

## 2022-09-15 DIAGNOSIS — M19.012 PRIMARY OSTEOARTHRITIS OF LEFT SHOULDER: ICD-10-CM

## 2022-09-15 DIAGNOSIS — Z96.612 STATUS POST TOTAL SHOULDER REPLACEMENT, LEFT: Primary | ICD-10-CM

## 2022-09-15 DIAGNOSIS — M25.612 SHOULDER JOINT STIFFNESS, LEFT: ICD-10-CM

## 2022-09-15 PROCEDURE — 97140 MANUAL THERAPY 1/> REGIONS: CPT

## 2022-09-15 PROCEDURE — 97110 THERAPEUTIC EXERCISES: CPT

## 2022-09-15 NOTE — PROGRESS NOTES
PT DAILY TREATMENT NOTE    Patient Name: Freedom Ring  Date:9/15/2022  : 1955  [x]  Patient  Verified  Payor: Jam Bryant / Plan: [place] MEDICARE PART [place] & B / Product Type: Medicare /    Total Treatment Time (min): 55  1:1 Treatment Time ( [address] only): 30  Referring Provider: Herbie Ramirez MD    1. Status post total shoulder replacement, left  2. Primary osteoarthritis of left shoulder  3. Shoulder joint stiffness, left    SUBJECTIVE  Subjective functional status/changes:   [] No changes reported    Patient reports he has been stretching a little more aggressively at home. OBJECTIVE/TREATMENT    Manual Therapy x 15 mins:   Manual posterior and inferior glenohumeral mobilizations and PROM for glenohumeral mobility and flexibility all planes to decrease joint restrictions and increase range of motion while patient supine/sidelying. LLPS for internal/external rotation with humeral head stabilization within TSR protocol. Scap stabilization with mobilization. Neuromuscular Re-education (NMR) x   minutes:  []  Kinesiotaping for   []  Neuromuscular reeducation to the VMO with use of [place] electrical stimulation in conjunction with active contraction and exercises. []  balance/proprioceptive exercises and activities in clinic as listed below as NMR. Therapeutic Exercise x 15 mins:   Strengthening/Endurance/ADL function/Neuromuscular reeducation activities/exercises supervised and completed as listed below.       EXERCISE X= completed on  9/15/2022   pulleys Stand/sit   Ball roll   TG   Scap rows blue   ant/post delt org   AAROM w/stick 3#   UBE 4'/4' level 2.0   DAP bicep curls 5#   RC AROM org   DAP ecc scap 5#   BOW ABC x   BOW push ups x   DAP FWD press 5#                                 Added/Changed Exercises:  [x]  Advanced to address: [x] functional strength/ROM deficits [x] stabilization/proprioceptive tasks  []  Modified: [] per subjective reports [] for patient time constraints [] for clinic time constraints    Modality:  []  E-Stim: type _ x _ min     []att   []unatt   []w/ice   []w/heat  []  Ultrasound: []cont   []pulse    _ W/cm2 x _  min   []1MHz   []3MHz  [x]  Ice pack: post      []  Hot pack: pre    []  Other:     Patient Education: [] Review HEP    [] Progressed/Changed HEP to include:    [] positioning   [] body mechanics   [] transfers   [] heat/ice application      ASSESSMENT    Still with appropriate ER stiffness and fatigue with long-axis strengthening. Progress exercises at next visit.     PLAN  [x]  Upgrade activities as tolerated      [x]  Continue plan of care  []  Discharge due to:  [] Other:       Pedrito Benitez PTA 9/15/2022

## 2022-09-15 NOTE — PROGRESS NOTES
I have reviewed the notes, assessments, and/or procedures performed by Mai Councilman PTA, I concur with her/his documentation of Edilma Presley.

## 2022-09-20 ENCOUNTER — OFFICE VISIT (OUTPATIENT)
Dept: ORTHOPEDIC SURGERY | Age: 67
End: 2022-09-20
Payer: MEDICARE

## 2022-09-20 DIAGNOSIS — M19.012 PRIMARY OSTEOARTHRITIS OF LEFT SHOULDER: ICD-10-CM

## 2022-09-20 DIAGNOSIS — Z96.612 STATUS POST TOTAL SHOULDER REPLACEMENT, LEFT: Primary | ICD-10-CM

## 2022-09-20 DIAGNOSIS — M25.512 ACUTE PAIN OF LEFT SHOULDER: ICD-10-CM

## 2022-09-20 DIAGNOSIS — M25.612 SHOULDER JOINT STIFFNESS, LEFT: ICD-10-CM

## 2022-09-20 PROCEDURE — 97110 THERAPEUTIC EXERCISES: CPT

## 2022-09-20 PROCEDURE — 97140 MANUAL THERAPY 1/> REGIONS: CPT

## 2022-09-20 NOTE — PROGRESS NOTES
I have reviewed the notes, assessments, and/or procedures performed by Dana Fuss PTA, I concur with her/his documentation of Carl Andrade.

## 2022-09-20 NOTE — PROGRESS NOTES
PT DAILY TREATMENT NOTE    Patient Name: Kinga Daniel  Date:2022  : 1955  [x]  Patient  Verified  Payor: Igor Alt / Plan: [place] MEDICARE PART A & B / Product Type: Medicare /    Total Treatment Time (min): 55  1:1 Treatment Time ([facility] only): 30  Referring Provider: Della Hurtado MD    1. Status post total shoulder replacement, left  2. Primary osteoarthritis of left shoulder  3. Shoulder joint stiffness, left  4. Acute pain of left shoulder    SUBJECTIVE  Subjective functional status/changes:   [] No changes reported    No new reports/complaints. OBJECTIVE/TREATMENT    Manual Therapy x 15 mins:   Manual posterior and inferior glenohumeral mobilizations and PROM for glenohumeral mobility and flexibility all planes to decrease joint restrictions and increase range of motion while patient supine/sidelying. LLPS for internal/external rotation with humeral head stabilization within TSR protocol. Scap stabilization with mobilization. Neuromuscular Re-education (NMR) x   minutes:  []  Kinesiotaping for   []  Neuromuscular reeducation to the VMO with use of [place] electrical stimulation in conjunction with active contraction and exercises. []  balance/proprioceptive exercises and activities in clinic as listed below as NMR. Therapeutic Exercise x 15 mins:   Strengthening/Endurance/ADL function/Neuromuscular reeducation activities/exercises supervised and completed as listed below.       EXERCISE X= completed on  2022   pulleys Stand/sit   Ball roll   TG   Scap rows blue   ant/post delt org   AAROM w/stick 3#   UBE 4'/4' level 2.0   DAP bicep curls 7.5#   RC AROM org   DAP ecc scap 7.5#   BOW ABC x   BOW push ups x   DAP FWD press 5#   bodyblade x                             Added/Changed Exercises:  [x]  Advanced to address: [x] functional strength/ROM deficits [x] stabilization/proprioceptive tasks  []  Modified: [] per subjective reports [] for patient time constraints [] for clinic time constraints    Modality:  []  E-Stim: type _ x _ min     []att   []unatt   []w/ice   []w/heat  []  Ultrasound: []cont   []pulse    _ W/cm2 x _  min   []1MHz   []3MHz  [x]  Ice pack: post      []  Hot pack: pre    []  Other:     Patient Education: [] Review HEP    [] Progressed/Changed HEP to include:    [] positioning   [] body mechanics   [] transfers   [] heat/ice application      ASSESSMENT    Appropriate challenge with exercise progression today but no pain. Still with capsular restrictions all directions but making gains with current POC.      PLAN  [x]  Upgrade activities as tolerated      [x]  Continue plan of care  []  Discharge due to:  [] Other:       Jace Canela, PTA 9/20/2022

## 2022-09-22 ENCOUNTER — OFFICE VISIT (OUTPATIENT)
Dept: ORTHOPEDIC SURGERY | Age: 67
End: 2022-09-22
Payer: MEDICARE

## 2022-09-22 DIAGNOSIS — Z96.612 STATUS POST TOTAL SHOULDER REPLACEMENT, LEFT: Primary | ICD-10-CM

## 2022-09-22 DIAGNOSIS — M25.612 SHOULDER JOINT STIFFNESS, LEFT: ICD-10-CM

## 2022-09-22 DIAGNOSIS — M19.012 PRIMARY OSTEOARTHRITIS OF LEFT SHOULDER: ICD-10-CM

## 2022-09-22 DIAGNOSIS — M25.512 ACUTE PAIN OF LEFT SHOULDER: ICD-10-CM

## 2022-09-22 PROCEDURE — 97110 THERAPEUTIC EXERCISES: CPT

## 2022-09-22 PROCEDURE — 97140 MANUAL THERAPY 1/> REGIONS: CPT

## 2022-09-22 NOTE — PROGRESS NOTES
PT DAILY TREATMENT NOTE    Patient Name: Freedom Ring  Date:2022  : 1955  [x]  Patient  Verified  Payor: Jam Ni / Plan: [place] MEDICARE PART A & B / Product Type: Medicare /    Total Treatment Time (min): 55  1:1 Treatment Time ([address] only): 40  Referring Provider: Herbie Ramirez MD    1. Status post total shoulder replacement, left  2. Primary osteoarthritis of left shoulder  3. Shoulder joint stiffness, left  4. Acute pain of left shoulder    SUBJECTIVE  Subjective functional status/changes:   [] No changes reported    No new reports/complaints. OBJECTIVE/TREATMENT    Manual Therapy x 15 mins:   Manual posterior and inferior glenohumeral mobilizations and PROM for glenohumeral mobility and flexibility all planes to decrease joint restrictions and increase range of motion while patient supine/sidelying. LLPS for internal/external rotation with humeral head stabilization within TSR protocol. Scap stabilization with mobilization. Neuromuscular Re-education (NMR) x   minutes:  []  Kinesiotaping for   []  Neuromuscular reeducation to the VMO with use of [place] electrical stimulation in conjunction with active contraction and exercises. []  balance/proprioceptive exercises and activities in clinic as listed below as NMR. Therapeutic Exercise x 25 mins:   Strengthening/Endurance/ADL function/Neuromuscular reeducation activities/exercises supervised and completed as listed below.       EXERCISE X= completed on  2022   pulleys Stand/sit   Ball roll   TG   Scap rows blue   ant/post delt org   AAROM w/stick 3#   UBE 4'/4' level 2.0   DAP bicep curls 7.5#   RC AROM org   DAP ecc scap 7.5#   BOW ABC x   BOW push ups x   DAP FWD press 7.5#   bodyblade x                             Added/Changed Exercises:  [x]  Advanced to address: [x] functional strength/ROM deficits [] stabilization/proprioceptive tasks  []  Modified: [] per subjective reports [] for patient time constraints [] for clinic time constraints    Modality:  []  E-Stim: type _ x _ min     []att   []unatt   []w/ice   []w/heat  []  Ultrasound: []cont   []pulse    _ W/cm2 x _  min   []1MHz   []3MHz  [x]  Ice pack: post      []  Hot pack: pre    []  Other:     Patient Education: [] Review HEP    [] Progressed/Changed HEP to include:    [] positioning   [] body mechanics   [] transfers   [] heat/ice application      ASSESSMENT    He is tight through subscap/lats with overhead flexion stretching. Strength improving but still not able to perform heavier household/yard tasks as desired.      PLAN  [x]  Upgrade activities as tolerated      [x]  Continue plan of care  []  Discharge due to:  [] Other:       Tavo Lima PTA 9/22/2022

## 2022-09-27 ENCOUNTER — OFFICE VISIT (OUTPATIENT)
Dept: ORTHOPEDIC SURGERY | Age: 67
End: 2022-09-27
Payer: MEDICARE

## 2022-09-27 DIAGNOSIS — Z96.612 STATUS POST TOTAL SHOULDER REPLACEMENT, LEFT: Primary | ICD-10-CM

## 2022-09-27 DIAGNOSIS — M25.512 ACUTE PAIN OF LEFT SHOULDER: ICD-10-CM

## 2022-09-27 DIAGNOSIS — M19.012 PRIMARY OSTEOARTHRITIS OF LEFT SHOULDER: ICD-10-CM

## 2022-09-27 DIAGNOSIS — M25.612 SHOULDER JOINT STIFFNESS, LEFT: ICD-10-CM

## 2022-09-27 PROCEDURE — 97110 THERAPEUTIC EXERCISES: CPT | Performed by: PHYSICAL THERAPIST

## 2022-09-27 PROCEDURE — 97140 MANUAL THERAPY 1/> REGIONS: CPT | Performed by: PHYSICAL THERAPIST

## 2022-09-27 NOTE — PROGRESS NOTES
PT DAILY TREATMENT NOTE    Patient Name: Carl Andrade  Date:2022  : 1955  [x]  Patient  Verified  Payor: Gina Webber / Plan: [place] MEDICARE PART A & B / Product Type: Medicare /    Total Treatment Time (min): 55  1:1 Treatment Time ([address] only): 30  Referring Provider: Brian Nicholas MD    1. Status post total shoulder replacement, left  2. Primary osteoarthritis of left shoulder  3. Shoulder joint stiffness, left  4. Acute pain of left shoulder    SUBJECTIVE  Subjective functional status/changes:   [] No changes reported  Patient states he still has some difficulty with full functional IR/ER but overall he is doing well with progressing ADL activity. OBJECTIVE/TREATMENT    Manual Therapy x 15 mins:   Manual posterior and inferior glenohumeral mobilizations and PROM for glenohumeral mobility and flexibility all planes to decrease joint restrictions and increase range of motion while patient supine/sidelying. LLPS for internal/external rotation with humeral head stabilization within TSR protocol. Scap stabilization with mobilization. Neuromuscular Re-education (NMR) x   minutes:  []  Kinesiotaping for   []  Neuromuscular reeducation to the VMO with use of [place] electrical stimulation in conjunction with active contraction and exercises. []  balance/proprioceptive exercises and activities in clinic as listed below as NMR. Therapeutic Exercise x 15 mins:   Strengthening/Endurance/ADL function/Neuromuscular reeducation activities/exercises supervised and completed as listed below. EXERCISE X= completed on  2022   pulleys Stand/sit   Ball roll   TG   Scap rows blue   ant/post delt org   AAROM w/stick 3#   UBE 4'/4' level 2.0   DAP bicep curls 7.5#   RC AROM org   DAP ecc scap 7.5#   BOW ABC x   BOW push ups x   DAP FWD press 7.5#   bodyblade x                             SPADI: 4/130.      Added/Changed Exercises:  [x]  Advanced to address: [x] functional strength/ROM deficits [] stabilization/proprioceptive tasks  []  Modified: [] per subjective reports [] for patient time constraints [] for clinic time constraints    Modality:  []  E-Stim: type _ x _ min     []att   []unatt   []w/ice   []w/heat  []  Ultrasound: []cont   []pulse    _ W/cm2 x _  min   []1MHz   []3MHz  [x]  Ice pack: post      []  Hot pack: pre    []  Other:     Patient Education: [] Review HEP    [] Progressed/Changed HEP to include:    [] positioning   [] body mechanics   [] transfers   [] heat/ice application      ASSESSMENT    Patient does have some expected limitations in functional ER/IR but overall seems to be progressing well utilizing current plan of care.     PLAN  [x]  Upgrade activities as tolerated      [x]  Continue plan of care  []  Discharge due to:  [] Other:       Deejay Crandall, PT, DPT 9/27/2022

## 2022-09-30 ENCOUNTER — OFFICE VISIT (OUTPATIENT)
Dept: ORTHOPEDIC SURGERY | Age: 67
End: 2022-09-30
Payer: MEDICARE

## 2022-09-30 DIAGNOSIS — M19.012 PRIMARY OSTEOARTHRITIS OF LEFT SHOULDER: ICD-10-CM

## 2022-09-30 DIAGNOSIS — M25.512 ACUTE PAIN OF LEFT SHOULDER: ICD-10-CM

## 2022-09-30 DIAGNOSIS — Z96.612 STATUS POST TOTAL SHOULDER REPLACEMENT, LEFT: Primary | ICD-10-CM

## 2022-09-30 DIAGNOSIS — M25.612 SHOULDER JOINT STIFFNESS, LEFT: ICD-10-CM

## 2022-09-30 PROCEDURE — 97110 THERAPEUTIC EXERCISES: CPT

## 2022-09-30 PROCEDURE — 97140 MANUAL THERAPY 1/> REGIONS: CPT

## 2022-09-30 NOTE — PROGRESS NOTES
PT DAILY TREATMENT NOTE    Patient Name: Freedom Ring  Date:2022  : 1955  [x]  Patient  Verified  Payor: Jam Bryant / Plan: [place] MEDICARE PART A & B / Product Type: Medicare /    Total Treatment Time (min): 55  1:1 Treatment Time ([address] only): 40  Referring Provider: Herbie Ramirez MD    1. Status post total shoulder replacement, left  2. Primary osteoarthritis of left shoulder  3. Shoulder joint stiffness, left  4. Acute pain of left shoulder    SUBJECTIVE  Subjective functional status/changes:   [] No changes reported    Patient states he is feeling good with most things and working out more at the gym to help with strengthening. OBJECTIVE/TREATMENT    Manual Therapy x 15 mins:   Manual posterior and inferior glenohumeral mobilizations and PROM for glenohumeral mobility and flexibility all planes to decrease joint restrictions and increase range of motion while patient supine/sidelying. LLPS for internal/external rotation with humeral head stabilization within TSR protocol. Scap stabilization with mobilization. Neuromuscular Re-education (NMR) x   minutes:  []  Kinesiotaping for   []  Neuromuscular reeducation to the VMO with use of [place] electrical stimulation in conjunction with active contraction and exercises. []  balance/proprioceptive exercises and activities in clinic as listed below as NMR. Therapeutic Exercise x 25 mins:   Strengthening/Endurance/ADL function/Neuromuscular reeducation activities/exercises supervised and completed as listed below.       EXERCISE X= completed on  2022   pulleys Stand/sit   Ball roll   TG   Scap rows blue   ant/post delt org   AAROM w/stick 3#   UBE 4'/4' level 2.0   DAP bicep curls 7.5#   RC AROM org   DAP ecc scap 7.5#   BOW ABC x   BOW push ups x   DAP FWD press 7.5#   bodyblade x                               Added/Changed Exercises:  []  Advanced to address: [] functional strength/ROM deficits [] stabilization/proprioceptive tasks  []  Modified: [] per subjective reports [] for patient time constraints [] for clinic time constraints    Modality:  []  E-Stim: type _ x _ min     []att   []unatt   []w/ice   []w/heat  []  Ultrasound: []cont   []pulse    _ W/cm2 x _  min   []1MHz   []3MHz  [x]  Ice pack: post      []  Hot pack: pre    []  Other:     Patient Education: [] Review HEP    [] Progressed/Changed HEP to include:    [] positioning   [] body mechanics   [] transfers   [] heat/ice application      ASSESSMENT    Still with appropriate end range motion loss but no significant functional limitations. He is doing more independent strengthening at the gym in preparation for D/C. We will continue x2 visits before MD follow-up next week and likely D/C at that time.      PLAN  [x]  Upgrade activities as tolerated      [x]  Continue plan of care  []  Discharge due to:  [] Other:       Dana Fuss, PTA 9/30/2022

## 2022-09-30 NOTE — PROGRESS NOTES
I have reviewed the notes, assessments, and/or procedures performed by Samuel Sweet PTA, I concur with her/his documentation of Kinga Daniel.

## 2022-10-04 ENCOUNTER — OFFICE VISIT (OUTPATIENT)
Dept: ORTHOPEDIC SURGERY | Age: 67
End: 2022-10-04
Payer: MEDICARE

## 2022-10-04 DIAGNOSIS — M25.612 SHOULDER JOINT STIFFNESS, LEFT: ICD-10-CM

## 2022-10-04 DIAGNOSIS — M19.012 PRIMARY OSTEOARTHRITIS OF LEFT SHOULDER: ICD-10-CM

## 2022-10-04 DIAGNOSIS — Z96.612 STATUS POST TOTAL SHOULDER REPLACEMENT, LEFT: Primary | ICD-10-CM

## 2022-10-04 PROCEDURE — 97140 MANUAL THERAPY 1/> REGIONS: CPT

## 2022-10-04 PROCEDURE — 97110 THERAPEUTIC EXERCISES: CPT

## 2022-10-04 NOTE — PROGRESS NOTES
I have reviewed the notes, assessments, and/or procedures performed by Anthony Alberts PTA, I concur with her/his documentation of Adri Mueller.

## 2022-10-04 NOTE — PROGRESS NOTES
PT DAILY TREATMENT NOTE    Patient Name: Niels Gould  Date:10/4/2022  : 1955  [x]  Patient  Verified  Payor: Marianne Murdock / Plan: [place] MED[place] & [place] / Product Type: Medicare /    Total Treatment Time (min): 55  1:1 Treatment Time ([address] only): 30  Referring Provider: Kasi Gant MD    1. Status post total shoulder replacement, left  2. Primary osteoarthritis of left shoulder  3. Shoulder joint stiffness, left    SUBJECTIVE  Subjective functional status/changes:   [] No changes reported    Patient states shoulder feels good with little to no issues. OBJECTIVE/TREATMENT    Manual Therapy x 15 mins:   Manual posterior and inferior glenohumeral mobilizations and PROM for glenohumeral mobility and flexibility all planes to decrease joint restrictions and increase range of motion while patient supine/sidelying. LLPS for internal/external rotation with humeral head stabilization within TSR protocol. Scap stabilization with mobilization. Neuromuscular Re-education (NMR) x   minutes:  []  Kinesiotaping for   []  Neuromuscular reeducation to the VMO with use of [place] electrical stimulation in conjunction with active contraction and exercises. []  balance/proprioceptive exercises and activities in clinic as listed below as NMR. Therapeutic Exercise x 15 mins:   Strengthening/Endurance/ADL function/Neuromuscular reeducation activities/exercises supervised and completed as listed below.       EXERCISE X= completed on  10/4/2022   pulleys Stand/sit   Ball roll   TG   Scap rows blue   ant/post delt org   AAROM w/stick 3#   UBE 4'/4' level 2.0   DAP bicep curls 7.5#   RC AROM org   DAP ecc scap 7.5#   BOW ABC x   BOW push ups x   DAP FWD press 7.5#   bodyblade x                               Added/Changed Exercises:  []  Advanced to address: [] functional strength/ROM deficits [] stabilization/proprioceptive tasks  []  Modified: [] per subjective reports [] for patient time constraints [] for clinic time constraints    Modality:  []  E-Stim: type _ x _ min     []att   []unatt   []w/ice   []w/heat  []  Ultrasound: []cont   []pulse    _ W/cm2 x _  min   []1MHz   []3MHz  [x]  Ice pack: post      []  Hot pack: pre    []  Other:     Patient Education: [] Review HEP    [] Progressed/Changed HEP to include:    [] positioning   [] body mechanics   [] transfers   [] heat/ice application      ASSESSMENT    He has one appointment remaining before MD follow-up later this week with plan to D/C at that time. He has little to no pain with ADLs and denies any significant functional limitations.      PLAN  [x]  Upgrade activities as tolerated      [x]  Continue plan of care  []  Discharge due to:  [] Other:       Thais Nelson, VALERIO 10/4/2022

## 2022-10-06 ENCOUNTER — OFFICE VISIT (OUTPATIENT)
Dept: ORTHOPEDIC SURGERY | Age: 67
End: 2022-10-06
Payer: MEDICARE

## 2022-10-06 ENCOUNTER — OFFICE VISIT (OUTPATIENT)
Dept: ORTHOPEDIC SURGERY | Age: 67
End: 2022-10-06

## 2022-10-06 DIAGNOSIS — Z96.612 STATUS POST TOTAL SHOULDER REPLACEMENT, LEFT: Primary | ICD-10-CM

## 2022-10-06 DIAGNOSIS — M19.012 PRIMARY OSTEOARTHRITIS OF LEFT SHOULDER: ICD-10-CM

## 2022-10-06 DIAGNOSIS — G89.29 CHRONIC LEFT SHOULDER PAIN: Primary | ICD-10-CM

## 2022-10-06 DIAGNOSIS — M25.512 CHRONIC LEFT SHOULDER PAIN: Primary | ICD-10-CM

## 2022-10-06 DIAGNOSIS — M25.612 SHOULDER JOINT STIFFNESS, LEFT: ICD-10-CM

## 2022-10-06 PROCEDURE — 1101F PT FALLS ASSESS-DOCD LE1/YR: CPT | Performed by: ORTHOPAEDIC SURGERY

## 2022-10-06 PROCEDURE — G8417 CALC BMI ABV UP PARAM F/U: HCPCS | Performed by: ORTHOPAEDIC SURGERY

## 2022-10-06 PROCEDURE — 99212 OFFICE O/P EST SF 10 MIN: CPT | Performed by: ORTHOPAEDIC SURGERY

## 2022-10-06 PROCEDURE — 97140 MANUAL THERAPY 1/> REGIONS: CPT

## 2022-10-06 PROCEDURE — 1123F ACP DISCUSS/DSCN MKR DOCD: CPT | Performed by: ORTHOPAEDIC SURGERY

## 2022-10-06 PROCEDURE — G8427 DOCREV CUR MEDS BY ELIG CLIN: HCPCS | Performed by: ORTHOPAEDIC SURGERY

## 2022-10-06 PROCEDURE — 97110 THERAPEUTIC EXERCISES: CPT

## 2022-10-06 PROCEDURE — G8536 NO DOC ELDER MAL SCRN: HCPCS | Performed by: ORTHOPAEDIC SURGERY

## 2022-10-06 PROCEDURE — 3017F COLORECTAL CA SCREEN DOC REV: CPT | Performed by: ORTHOPAEDIC SURGERY

## 2022-10-06 PROCEDURE — G8432 DEP SCR NOT DOC, RNG: HCPCS | Performed by: ORTHOPAEDIC SURGERY

## 2022-10-06 NOTE — PROGRESS NOTES
Antoni David (: 1955) is a 75 y.o. male, patient, here for evaluation of the following chief complaint(s):  Shoulder Pain (Shoulder pain)       HPI:    Patient is here status post left shoulder reverse shoulder replacement. He is now 3 months postop. He has completed physical therapy. His range of motion is significant proved. Strength significant proved. Overall doing very well. Allergies   Allergen Reactions    Pcn [Penicillins] Rash     Patient screened for any delayed non-IgE-mediated reaction to PCN. Patient notes the following:    No delayed non-IgE-mediated reaction to PCN    Patient screened for any delayed non-IgE-mediated reaction to PCN. Patient notes the following:    No delayed non-IgE-mediated reaction to PCN           Current Outpatient Medications   Medication Sig    losartan (COZAAR) 100 mg tablet Take 100 mg by mouth daily.  hydroCHLOROthiazide (HYDRODIURIL) 25 mg tablet Take 25 mg by mouth daily.  cholecalciferol (VITAMIN D3) 25 mcg (1,000 unit) cap Take 1,000 Units by mouth daily.  amLODIPine (NORVASC) 10 mg tablet Take 5 mg by mouth daily.  triamcinolone acetonide (KENALOG) 0.5 % ointment Apply  to affected area daily as needed for Skin Irritation. use thin layer    aspirin delayed-release 81 mg tablet Take 81 mg by mouth daily.  levothyroxine (SYNTHROID) 100 mcg tablet Take 100 mcg by mouth Daily (before breakfast).  pravastatin (PRAVACHOL) 20 mg tablet Take 20 mg by mouth daily.  MULTIVIT-MIN/FOLIC/VIT K/LYCOP (ONE-A-DAY MEN'S MULTIVITAMIN PO) Take 1 Tab by mouth daily. No current facility-administered medications for this visit.        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        Past Surgical History:   Procedure Laterality Date    COLONOSCOPY N/A 10/23/2020    COLONOSCOPY performed by Clem Gilman MD at [address]  10/23/2020         HX ADENOIDECTOMY  CHILD    HX COLONOSCOPY  2014    HX KNEE REPLACEMENT Left     HX MALIGNANT SKIN LESION EXCISION Right 05/2020    ear, melanoma    HX SHOULDER REPLACEMENT Right 06/2020    HX THYROIDECTOMY  12/2006    pt unsure if parathyroid or thyroid    HX TONSILLECTOMY  CHILD    HX WISDOM TEETH EXTRACTION         Family History   Problem Relation Age of Onset    Hypertension Mother     Cancer Father         MULT.  MYELOMA    Diabetes Maternal Grandmother     Cancer Paternal Grandfather         LUNG CA    Anesth Problems Neg Hx     Asthma Neg Hx     Heart Disease Neg Hx         Social History     Socioeconomic History    Marital status:      Spouse name: Not on file    Number of children: Not on file    Years of education: Not on file    Highest education level: Not on file   Occupational History    Not on file   Tobacco Use    Smoking status: Never    Smokeless tobacco: Never   Vaping Use    Vaping Use: Never used   Substance and Sexual Activity    Alcohol use: Not Currently    Drug use: No    Sexual activity: Not on file   Other Topics Concern     Service Not Asked    Blood Transfusions Not Asked    Caffeine Concern Not Asked    Occupational Exposure Not Asked    Hobby Hazards Not Asked    Sleep Concern Not Asked    Stress Concern Not Asked    Weight Concern Not Asked    Special Diet Not Asked    Back Care Not Asked    Exercise Not Asked    Bike Helmet Not Asked   [place] Not Asked    Self-Exams Not Asked   Social History Narrative    Not on file     Social Determinants of Health     Financial Resource Strain: Not on file   Food Insecurity: Not on file   Transportation Needs: Not on file   Physical Activity: Not on file   Stress: Not on file   Social Connections: Not on file   Intimate Partner Violence: Not on file   Housing Stability: Not on file       Review of Systems   Musculoskeletal:         Shoulder post op     Vitals: There were no vitals taken for this visit. There is no height or weight on file to calculate BMI. Ortho Exam     Left shoulder: Surgical incisions well-healed. Range of motion 150 degrees of forward elevation 130 degrees of lateral abduction external rotation to 40 degrees and internal rotation to his lower lumbar spine. Extremity is neurovascular intact. ASSESSMENT/PLAN:      Doing very well status post left shoulder replacement. He may discontinue his physical therapy. Counseled him on withholding from dental procedures at this time till he gets to the 6-month rajesh. We will see him back in 3 months at his 6-month anniversary we will get an x-ray of the left shoulder.       Nathaly Peng MD

## 2022-10-06 NOTE — PROGRESS NOTES
PT DAILY TREATMENT NOTE    Patient Name: Doreen Giblert  Date:10/6/2022  : 1955  [x]  Patient  Verified  Payor: Cathy Alejandro / Plan: [place] MEDICARE PART A & B / Product Type: Medicare /    Total Treatment Time (min): 55  1:1 Treatment Time ([address] only): 30  Referring Provider: Tiara Herr MD    1. Status post total shoulder replacement, left  2. Primary osteoarthritis of left shoulder  3. Shoulder joint stiffness, left    SUBJECTIVE  Subjective functional status/changes:   [] No changes reported    Patient reports no pain or limitations with the shoulder. He feels ready to continue on his own at this point. OBJECTIVE/TREATMENT    Manual Therapy x 15 mins:   Manual posterior and inferior glenohumeral mobilizations and PROM for glenohumeral mobility and flexibility all planes to decrease joint restrictions and increase range of motion while patient supine/sidelying. LLPS for internal/external rotation with humeral head stabilization within TSR protocol. Scap stabilization with mobilization. Neuromuscular Re-education (NMR) x   minutes:  []  Kinesiotaping for   []  Neuromuscular reeducation to the VMO with use of [place] electrical stimulation in conjunction with active contraction and exercises. []  balance/proprioceptive exercises and activities in clinic as listed below as NMR. Therapeutic Exercise x 15 mins:   Strengthening/Endurance/ADL function/Neuromuscular reeducation activities/exercises supervised and completed as listed below.       EXERCISE X= completed on  10/6/2022   pulleys Stand/sit   Ball roll   TG   Scap rows purple   ant/post delt grn   AAROM w/stick 5#   UBE 4'/4' level 2.0   DAP bicep curls 10#   RC AROM grn   DAP ecc scap 10#   BOW ABC x   BOW push ups x   DAP FWD press 10#   bodyblade x                               Added/Changed Exercises:  [x]  Advanced to address: [x] functional strength/ROM deficits [] stabilization/proprioceptive tasks  []  Modified: [] per subjective reports [] for patient time constraints [] for clinic time constraints    Modality:  []  E-Stim: type _ x _ min     []att   []unatt   []w/ice   []w/heat  []  Ultrasound: []cont   []pulse    _ W/cm2 x _  min   []1MHz   []3MHz  [x]  Ice pack: post      []  Hot pack: pre    []  Other:     Patient Education: [] Review HEP    [] Progressed/Changed HEP to include:    [] positioning   [] body mechanics   [] transfers   [] heat/ice application      ASSESSMENT    He has no pain in the shoulder. He denies any functional limitations. He is I with strengthening and in agreement with plan to D/C at this time.      PLAN  []  Upgrade activities as tolerated      []  Continue plan of care  [x]  Discharge due to having met all functional goals  [] Other:       Luz Marina Linn, PTA 10/6/2022

## 2022-10-06 NOTE — LETTER
10/6/2022    Patient: Tony Garcia   YOB: 1955   Date of Visit: 10/6/2022     Evelyne Olvera MD  [place] [address]  Via Fax: [phone]    Dear Evelyne Olvera MD,      Thank you for referring Mr. Tony Garcia to [facility] for evaluation. My notes for this consultation are attached. If you have questions, please do not hesitate to call me. I look forward to following your patient along with you.       Sincerely,    Ryan Salazar MD

## 2022-10-07 NOTE — PROGRESS NOTES
I have reviewed the notes, assessments, and/or procedures performed by Luh Lamebrt PTA, I concur with her/his documentation of Jayme Nichole.

## 2023-01-10 ENCOUNTER — OFFICE VISIT (OUTPATIENT)
Dept: ORTHOPEDIC SURGERY | Age: 68
End: 2023-01-10
Payer: MEDICARE

## 2023-01-10 DIAGNOSIS — Z96.612 H/O TOTAL SHOULDER REPLACEMENT, LEFT: Primary | ICD-10-CM

## 2023-01-10 DIAGNOSIS — G89.29 CHRONIC LEFT SHOULDER PAIN: ICD-10-CM

## 2023-01-10 DIAGNOSIS — M25.512 CHRONIC LEFT SHOULDER PAIN: ICD-10-CM

## 2023-01-10 RX ORDER — CLINDAMYCIN HYDROCHLORIDE 300 MG/1
CAPSULE ORAL
Qty: 4 CAPSULE | Refills: 4 | Status: SHIPPED | OUTPATIENT
Start: 2023-01-10

## 2023-01-10 NOTE — LETTER
1/10/2023    Patient: Baljeet Murrieta   YOB: 1955   Date of Visit: 1/10/2023     Tyree Booker MD  [place] [address]  Via Fax: [phone]    Dear Tyree Booker MD,      Thank you for referring Mr. Baljeet Murrieta to [facility] for evaluation. My notes for this consultation are attached. If you have questions, please do not hesitate to call me. I look forward to following your patient along with you.       Sincerely,    Kenneth Olvera MD

## 2023-01-10 NOTE — PROGRESS NOTES
Yoseph Sales (: 1955) is a 75 y.o. male, patient, here for evaluation of the following chief complaint(s):  Shoulder Pain (Left shoulder )       HPI:    Patient returns to the office now 6-month status post total shoulder replacement of the left. He is doing very well. He has had no complaints of pain. He has returned back to his prior level activity    Allergies   Allergen Reactions    Pcn [Penicillins] Rash     Patient screened for any delayed non-IgE-mediated reaction to PCN. Patient notes the following:    No delayed non-IgE-mediated reaction to PCN    Patient screened for any delayed non-IgE-mediated reaction to PCN. Patient notes the following:    No delayed non-IgE-mediated reaction to PCN           Current Outpatient Medications   Medication Sig    losartan (COZAAR) 100 mg tablet Take 100 mg by mouth daily. hydroCHLOROthiazide (HYDRODIURIL) 25 mg tablet Take 25 mg by mouth daily. cholecalciferol (VITAMIN D3) 25 mcg (1,000 unit) cap Take 1,000 Units by mouth daily. amLODIPine (NORVASC) 10 mg tablet Take 5 mg by mouth daily. triamcinolone acetonide (KENALOG) 0.5 % ointment Apply  to affected area daily as needed for Skin Irritation. use thin layer    aspirin delayed-release 81 mg tablet Take 81 mg by mouth daily. levothyroxine (SYNTHROID) 100 mcg tablet Take 100 mcg by mouth Daily (before breakfast). pravastatin (PRAVACHOL) 20 mg tablet Take 20 mg by mouth daily. MULTIVIT-MIN/FOLIC/VIT K/LYCOP (ONE-A-DAY MEN'S MULTIVITAMIN PO) Take 1 Tab by mouth daily. No current facility-administered medications for this visit.        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        Past Surgical History:   Procedure Laterality Date    COLONOSCOPY N/A 10/23/2020    COLONOSCOPY performed by Marjorie Masterson MD at [address]  10/23/2020         HX ADENOIDECTOMY  CHILD    HX COLONOSCOPY  2014    HX KNEE REPLACEMENT Left     HX MALIGNANT SKIN LESION EXCISION Right 05/2020    ear, melanoma    HX SHOULDER REPLACEMENT Right 06/2020    HX THYROIDECTOMY  12/2006    pt unsure if parathyroid or thyroid    HX TONSILLECTOMY  CHILD    HX WISDOM TEETH EXTRACTION         Family History   Problem Relation Age of Onset    Hypertension Mother     Cancer Father         MULT.  MYELOMA    Diabetes Maternal Grandmother     Cancer Paternal Grandfather         LUNG CA    Anesth Problems Neg Hx     Asthma Neg Hx     Heart Disease Neg Hx         Social History     Socioeconomic History    Marital status:      Spouse name: Not on file    Number of children: Not on file    Years of education: Not on file    Highest education level: Not on file   Occupational History    Not on file   Tobacco Use    Smoking status: Never    Smokeless tobacco: Never   Vaping Use    Vaping Use: Never used   Substance and Sexual Activity    Alcohol use: Not Currently    Drug use: No    Sexual activity: Not on file   Other Topics Concern     Service Not Asked    Blood Transfusions Not Asked    Caffeine Concern Not Asked    Occupational Exposure Not Asked    Hobby Hazards Not Asked    Sleep Concern Not Asked    Stress Concern Not Asked    Weight Concern Not Asked    Special Diet Not Asked    Back Care Not Asked    Exercise Not Asked    Bike Helmet Not Asked    Seat Belt Not Asked    Self-Exams Not Asked   Social History Narrative    Not on file     Social Determinants of Health     Financial Resource Strain: Not on file   Food Insecurity: Not on file   Transportation Needs: Not on file   Physical Activity: Not on file   Stress: Not on file   Social Connections: Not on file   Intimate Partner Violence: Not on file   Housing Stability: Not on file       Review of Systems   Musculoskeletal:         Left shoulder      Vitals: There were no vitals taken for this visit. There is no height or weight on file to calculate BMI. Ortho Exam     Patient is alert and oriented x3. Patient is in no acute distress. Patient ambulates with a nonantalgic gait. Left shoulder: Incision is healed. He has full range of motion of the shoulder joint without any pain. He has normal strength with forward elevation, lateral abduction external rotation. There is no swelling noted distally. Neurovascular examination is intact. XR Results (most recent):  Results from Appointment encounter on 01/10/23    XR SHOULDER LT AP/LAT MIN 2 V    Narrative  Three-view x-ray of the left shoulder reveals a well-seated prosthesis no lucencies identified       ASSESSMENT/PLAN:    Patient is doing very well. He is to continue with a home exercise program.  I would like him return to the office in 6 months for repeat x-ray. He was reminded to take antibiotics prior to dental procedures.         Kori Mccauley MD

## 2023-08-12 ENCOUNTER — HOSPITAL ENCOUNTER (EMERGENCY)
Facility: HOSPITAL | Age: 68
Discharge: HOME | DRG: 872 | End: 2023-08-12
Attending: EMERGENCY MEDICINE
Payer: MEDICARE

## 2023-08-12 ENCOUNTER — APPOINTMENT (OUTPATIENT)
Facility: HOSPITAL | Age: 68
DRG: 872 | End: 2023-08-12
Payer: MEDICARE

## 2023-08-12 ENCOUNTER — HOSPITAL ENCOUNTER (INPATIENT)
Facility: HOSPITAL | Age: 68
LOS: 2 days | Discharge: HOME | DRG: 872 | End: 2023-08-15
Attending: STUDENT IN AN ORGANIZED HEALTH CARE EDUCATION/TRAINING PROGRAM | Admitting: INTERNAL MEDICINE
Payer: MEDICARE

## 2023-08-12 VITALS
TEMPERATURE: 99.5 F | SYSTOLIC BLOOD PRESSURE: 139 MMHG | WEIGHT: 287.04 LBS | BODY MASS INDEX: 42.39 KG/M2 | RESPIRATION RATE: 20 BRPM | OXYGEN SATURATION: 96 % | DIASTOLIC BLOOD PRESSURE: 82 MMHG | HEART RATE: 87 BPM

## 2023-08-12 DIAGNOSIS — A41.9 SEPSIS WITHOUT ACUTE ORGAN DYSFUNCTION, DUE TO UNSPECIFIED ORGANISM (HCC): ICD-10-CM

## 2023-08-12 DIAGNOSIS — N10 ACUTE PYELONEPHRITIS: Primary | ICD-10-CM

## 2023-08-12 DIAGNOSIS — N12 PYELONEPHRITIS: Primary | ICD-10-CM

## 2023-08-12 LAB
ALBUMIN SERPL-MCNC: 3.5 G/DL (ref 3.5–5)
ALBUMIN/GLOB SERPL: 1.2 (ref 1.1–2.2)
ALP SERPL-CCNC: 51 U/L (ref 45–117)
ALT SERPL-CCNC: 36 U/L (ref 12–78)
ANION GAP SERPL CALC-SCNC: 7 MMOL/L (ref 5–15)
APPEARANCE UR: ABNORMAL
AST SERPL-CCNC: 13 U/L (ref 15–37)
BACTERIA URNS QL MICRO: ABNORMAL /HPF
BASOPHILS # BLD: 0 K/UL (ref 0–0.1)
BASOPHILS NFR BLD: 0 % (ref 0–1)
BILIRUB SERPL-MCNC: 0.5 MG/DL (ref 0.2–1)
BILIRUB UR QL CFM: ABNORMAL
BUN SERPL-MCNC: 19 MG/DL (ref 6–20)
BUN/CREAT SERPL: 16 (ref 12–20)
CALCIUM SERPL-MCNC: 9.5 MG/DL (ref 8.5–10.1)
CHLORIDE SERPL-SCNC: 99 MMOL/L (ref 97–108)
CO2 SERPL-SCNC: 26 MMOL/L (ref 21–32)
COLOR UR: ABNORMAL
COMMENT:: NORMAL
CREAT SERPL-MCNC: 1.16 MG/DL (ref 0.7–1.3)
DIFFERENTIAL METHOD BLD: ABNORMAL
EKG ATRIAL RATE: 102 BPM
EKG DIAGNOSIS: NORMAL
EKG P AXIS: 86 DEGREES
EKG P-R INTERVAL: 158 MS
EKG Q-T INTERVAL: 312 MS
EKG QRS DURATION: 90 MS
EKG QTC CALCULATION (BAZETT): 406 MS
EKG R AXIS: 44 DEGREES
EKG T AXIS: 5 DEGREES
EKG VENTRICULAR RATE: 102 BPM
EOSINOPHIL # BLD: 0 K/UL (ref 0–0.4)
EOSINOPHIL NFR BLD: 0 % (ref 0–7)
EPITH CASTS URNS QL MICRO: ABNORMAL /LPF
ERYTHROCYTE [DISTWIDTH] IN BLOOD BY AUTOMATED COUNT: 12.9 % (ref 11.5–14.5)
FLUAV AG NPH QL IA: NEGATIVE
FLUBV AG NOSE QL IA: NEGATIVE
GLOBULIN SER CALC-MCNC: 2.9 G/DL (ref 2–4)
GLUCOSE SERPL-MCNC: 113 MG/DL (ref 65–100)
GLUCOSE UR STRIP.AUTO-MCNC: NEGATIVE MG/DL
HCT VFR BLD AUTO: 42.5 % (ref 36.6–50.3)
HGB BLD-MCNC: 14.4 G/DL (ref 12.1–17)
HGB UR QL STRIP: ABNORMAL
IMM GRANULOCYTES # BLD AUTO: 0.2 K/UL (ref 0–0.04)
IMM GRANULOCYTES NFR BLD AUTO: 1 % (ref 0–0.5)
KETONES UR QL STRIP.AUTO: NEGATIVE MG/DL
LACTATE BLD-SCNC: 1.88 MMOL/L (ref 0.4–2)
LACTATE BLD-SCNC: 2.19 MMOL/L (ref 0.4–2)
LACTATE BLD-SCNC: 2.53 MMOL/L (ref 0.4–2)
LEUKOCYTE ESTERASE UR QL STRIP.AUTO: ABNORMAL
LYMPHOCYTES # BLD: 0.5 K/UL (ref 0.8–3.5)
LYMPHOCYTES NFR BLD: 2 % (ref 12–49)
MCH RBC QN AUTO: 30.3 PG (ref 26–34)
MCHC RBC AUTO-ENTMCNC: 33.9 G/DL (ref 30–36.5)
MCV RBC AUTO: 89.3 FL (ref 80–99)
MONOCYTES # BLD: 1.7 K/UL (ref 0–1)
MONOCYTES NFR BLD: 7 % (ref 5–13)
NEUTS SEG # BLD: 21.4 K/UL (ref 1.8–8)
NEUTS SEG NFR BLD: 90 % (ref 32–75)
NITRITE UR QL STRIP.AUTO: POSITIVE
NRBC # BLD: 0 K/UL (ref 0–0.01)
NRBC BLD-RTO: 0 PER 100 WBC
PH UR STRIP: 6 (ref 5–8)
PLATELET # BLD AUTO: 256 K/UL (ref 150–400)
PMV BLD AUTO: 9.1 FL (ref 8.9–12.9)
POTASSIUM SERPL-SCNC: 3.5 MMOL/L (ref 3.5–5.1)
PROT SERPL-MCNC: 6.4 G/DL (ref 6.4–8.2)
PROT UR STRIP-MCNC: 100 MG/DL
RBC # BLD AUTO: 4.76 M/UL (ref 4.1–5.7)
RBC #/AREA URNS HPF: ABNORMAL /HPF (ref 0–5)
RBC MORPH BLD: ABNORMAL
SARS-COV-2 RDRP RESP QL NAA+PROBE: NOT DETECTED
SODIUM SERPL-SCNC: 132 MMOL/L (ref 136–145)
SOURCE: NORMAL
SP GR UR REFRACTOMETRY: 1.02
SPECIMEN HOLD: NORMAL
TROPONIN I SERPL HS-MCNC: 9 NG/L (ref 0–76)
TSH SERPL DL<=0.05 MIU/L-ACNC: 0.69 UIU/ML (ref 0.36–3.74)
URINE CULTURE IF INDICATED: ABNORMAL
UROBILINOGEN UR QL STRIP.AUTO: 1 EU/DL (ref 0.2–1)
WBC # BLD AUTO: 23.8 K/UL (ref 4.1–11.1)
WBC URNS QL MICRO: ABNORMAL /HPF (ref 0–4)

## 2023-08-12 PROCEDURE — 84443 ASSAY THYROID STIM HORMONE: CPT

## 2023-08-12 PROCEDURE — 87804 INFLUENZA ASSAY W/OPTIC: CPT

## 2023-08-12 PROCEDURE — 2580000003 HC RX 258: Performed by: STUDENT IN AN ORGANIZED HEALTH CARE EDUCATION/TRAINING PROGRAM

## 2023-08-12 PROCEDURE — 81001 URINALYSIS AUTO W/SCOPE: CPT

## 2023-08-12 PROCEDURE — 87086 URINE CULTURE/COLONY COUNT: CPT

## 2023-08-12 PROCEDURE — 85025 COMPLETE CBC W/AUTO DIFF WBC: CPT

## 2023-08-12 PROCEDURE — 83605 ASSAY OF LACTIC ACID: CPT

## 2023-08-12 PROCEDURE — 87186 SC STD MICRODIL/AGAR DIL: CPT

## 2023-08-12 PROCEDURE — 96365 THER/PROPH/DIAG IV INF INIT: CPT

## 2023-08-12 PROCEDURE — 6360000002 HC RX W HCPCS: Performed by: EMERGENCY MEDICINE

## 2023-08-12 PROCEDURE — 87077 CULTURE AEROBIC IDENTIFY: CPT

## 2023-08-12 PROCEDURE — 71275 CT ANGIOGRAPHY CHEST: CPT

## 2023-08-12 PROCEDURE — 2580000003 HC RX 258: Performed by: EMERGENCY MEDICINE

## 2023-08-12 PROCEDURE — 36415 COLL VENOUS BLD VENIPUNCTURE: CPT

## 2023-08-12 PROCEDURE — 80053 COMPREHEN METABOLIC PANEL: CPT

## 2023-08-12 PROCEDURE — 74177 CT ABD & PELVIS W/CONTRAST: CPT

## 2023-08-12 PROCEDURE — 87040 BLOOD CULTURE FOR BACTERIA: CPT

## 2023-08-12 PROCEDURE — 84484 ASSAY OF TROPONIN QUANT: CPT

## 2023-08-12 PROCEDURE — 84145 PROCALCITONIN (PCT): CPT

## 2023-08-12 PROCEDURE — 93005 ELECTROCARDIOGRAM TRACING: CPT | Performed by: STUDENT IN AN ORGANIZED HEALTH CARE EDUCATION/TRAINING PROGRAM

## 2023-08-12 PROCEDURE — 6360000002 HC RX W HCPCS: Performed by: STUDENT IN AN ORGANIZED HEALTH CARE EDUCATION/TRAINING PROGRAM

## 2023-08-12 PROCEDURE — 71045 X-RAY EXAM CHEST 1 VIEW: CPT

## 2023-08-12 PROCEDURE — 99285 EMERGENCY DEPT VISIT HI MDM: CPT

## 2023-08-12 PROCEDURE — 93005 ELECTROCARDIOGRAM TRACING: CPT | Performed by: EMERGENCY MEDICINE

## 2023-08-12 PROCEDURE — 6360000004 HC RX CONTRAST MEDICATION: Performed by: EMERGENCY MEDICINE

## 2023-08-12 PROCEDURE — 87635 SARS-COV-2 COVID-19 AMP PRB: CPT

## 2023-08-12 RX ORDER — ONDANSETRON 2 MG/ML
4 INJECTION INTRAMUSCULAR; INTRAVENOUS ONCE
Status: COMPLETED | OUTPATIENT
Start: 2023-08-12 | End: 2023-08-12

## 2023-08-12 RX ORDER — KETOROLAC TROMETHAMINE 30 MG/ML
15 INJECTION, SOLUTION INTRAMUSCULAR; INTRAVENOUS ONCE
Status: COMPLETED | OUTPATIENT
Start: 2023-08-12 | End: 2023-08-12

## 2023-08-12 RX ORDER — 0.9 % SODIUM CHLORIDE 0.9 %
1000 INTRAVENOUS SOLUTION INTRAVENOUS ONCE
Status: COMPLETED | OUTPATIENT
Start: 2023-08-12 | End: 2023-08-12

## 2023-08-12 RX ORDER — CEFDINIR 300 MG/1
300 CAPSULE ORAL 2 TIMES DAILY
Qty: 20 CAPSULE | Refills: 0 | Status: ON HOLD | OUTPATIENT
Start: 2023-08-12 | End: 2023-08-15 | Stop reason: HOSPADM

## 2023-08-12 RX ORDER — ONDANSETRON 4 MG/1
4 TABLET, ORALLY DISINTEGRATING ORAL 3 TIMES DAILY PRN
Qty: 21 TABLET | Refills: 0 | Status: SHIPPED | OUTPATIENT
Start: 2023-08-12

## 2023-08-12 RX ORDER — 0.9 % SODIUM CHLORIDE 0.9 %
30 INTRAVENOUS SOLUTION INTRAVENOUS ONCE
Status: COMPLETED | OUTPATIENT
Start: 2023-08-12 | End: 2023-08-13

## 2023-08-12 RX ADMIN — SODIUM CHLORIDE 1000 ML: 9 INJECTION, SOLUTION INTRAVENOUS at 13:21

## 2023-08-12 RX ADMIN — SODIUM CHLORIDE 1000 MG: 900 INJECTION INTRAVENOUS at 14:56

## 2023-08-12 RX ADMIN — KETOROLAC TROMETHAMINE 15 MG: 30 INJECTION, SOLUTION INTRAMUSCULAR; INTRAVENOUS at 10:48

## 2023-08-12 RX ADMIN — CEFEPIME 2000 MG: 2 INJECTION, POWDER, FOR SOLUTION INTRAVENOUS at 23:34

## 2023-08-12 RX ADMIN — ONDANSETRON 4 MG: 2 INJECTION INTRAMUSCULAR; INTRAVENOUS at 10:49

## 2023-08-12 RX ADMIN — SODIUM CHLORIDE 2121 ML: 9 INJECTION, SOLUTION INTRAVENOUS at 23:35

## 2023-08-12 RX ADMIN — IOPAMIDOL 100 ML: 755 INJECTION, SOLUTION INTRAVENOUS at 11:51

## 2023-08-12 ASSESSMENT — PAIN DESCRIPTION - LOCATION: LOCATION: BACK;FLANK

## 2023-08-12 ASSESSMENT — LIFESTYLE VARIABLES
HOW OFTEN DO YOU HAVE A DRINK CONTAINING ALCOHOL: NEVER
HOW MANY STANDARD DRINKS CONTAINING ALCOHOL DO YOU HAVE ON A TYPICAL DAY: PATIENT DOES NOT DRINK

## 2023-08-12 ASSESSMENT — PAIN SCALES - GENERAL
PAINLEVEL_OUTOF10: 4
PAINLEVEL_OUTOF10: 4

## 2023-08-12 NOTE — DISCHARGE INSTRUCTIONS
You were evaluated in the emergency department for chills, body aches, and left flank pain after a ureteral stent was placed. Your examination was reassuring as was your work-up including CT scans. Your white count is elevated and you have signs of a urinary tract infection. You were offered admission but did not want to stay. Please take the antibiotics as prescribed until completion. It will be important for you to follow-up with your primary care physician and Urologist in 2-3 days. If you develop worsening symptoms such as worsening flank pain, weakness, fevers, or inability to take your antibiotics, please return to the emergency department immediately.

## 2023-08-13 PROBLEM — R65.20 SEVERE SEPSIS (HCC): Status: ACTIVE | Noted: 2023-08-13

## 2023-08-13 PROBLEM — A41.9 SEVERE SEPSIS (HCC): Status: ACTIVE | Noted: 2023-08-13

## 2023-08-13 LAB
ALBUMIN SERPL-MCNC: 3.7 G/DL (ref 3.5–5)
ALBUMIN/GLOB SERPL: 1.4 (ref 1.1–2.2)
ALP SERPL-CCNC: 54 U/L (ref 45–117)
ALT SERPL-CCNC: 37 U/L (ref 12–78)
ANION GAP SERPL CALC-SCNC: 7 MMOL/L (ref 5–15)
APPEARANCE UR: CLEAR
AST SERPL-CCNC: 30 U/L (ref 15–37)
BACTERIA SPEC CULT: ABNORMAL
BACTERIA SPEC CULT: NORMAL
BACTERIA SPEC CULT: NORMAL
BACTERIA URNS QL MICRO: NEGATIVE /HPF
BASOPHILS # BLD: 0 K/UL (ref 0–0.1)
BASOPHILS NFR BLD: 0 % (ref 0–1)
BILIRUB SERPL-MCNC: 0.5 MG/DL (ref 0.2–1)
BILIRUB UR QL CFM: NEGATIVE
BUN SERPL-MCNC: 23 MG/DL (ref 6–20)
BUN/CREAT SERPL: 17 (ref 12–20)
CALCIUM SERPL-MCNC: 9.3 MG/DL (ref 8.5–10.1)
CC UR VC: ABNORMAL
CHLORIDE SERPL-SCNC: 99 MMOL/L (ref 97–108)
CO2 SERPL-SCNC: 26 MMOL/L (ref 21–32)
COLOR UR: ABNORMAL
CREAT SERPL-MCNC: 1.39 MG/DL (ref 0.7–1.3)
DIFFERENTIAL METHOD BLD: ABNORMAL
EKG ATRIAL RATE: 122 BPM
EKG DIAGNOSIS: NORMAL
EKG Q-T INTERVAL: 296 MS
EKG QRS DURATION: 80 MS
EKG QTC CALCULATION (BAZETT): 423 MS
EKG R AXIS: 53 DEGREES
EKG T AXIS: 20 DEGREES
EKG VENTRICULAR RATE: 123 BPM
EOSINOPHIL # BLD: 0 K/UL (ref 0–0.4)
EOSINOPHIL NFR BLD: 0 % (ref 0–7)
EPITH CASTS URNS QL MICRO: ABNORMAL /LPF
ERYTHROCYTE [DISTWIDTH] IN BLOOD BY AUTOMATED COUNT: 13.4 % (ref 11.5–14.5)
GLOBULIN SER CALC-MCNC: 2.6 G/DL (ref 2–4)
GLUCOSE SERPL-MCNC: 107 MG/DL (ref 65–100)
GLUCOSE UR STRIP.AUTO-MCNC: NEGATIVE MG/DL
HCT VFR BLD AUTO: 41.5 % (ref 36.6–50.3)
HGB BLD-MCNC: 14.1 G/DL (ref 12.1–17)
HGB UR QL STRIP: ABNORMAL
IMM GRANULOCYTES # BLD AUTO: 0.3 K/UL (ref 0–0.04)
IMM GRANULOCYTES NFR BLD AUTO: 1 % (ref 0–0.5)
KETONES UR QL STRIP.AUTO: NEGATIVE MG/DL
LEUKOCYTE ESTERASE UR QL STRIP.AUTO: ABNORMAL
LYMPHOCYTES # BLD: 0.8 K/UL (ref 0.8–3.5)
LYMPHOCYTES NFR BLD: 3 % (ref 12–49)
MCH RBC QN AUTO: 30.7 PG (ref 26–34)
MCHC RBC AUTO-ENTMCNC: 34 G/DL (ref 30–36.5)
MCV RBC AUTO: 90.4 FL (ref 80–99)
MONOCYTES # BLD: 1.3 K/UL (ref 0–1)
MONOCYTES NFR BLD: 5 % (ref 5–13)
NEUTS SEG # BLD: 23 K/UL (ref 1.8–8)
NEUTS SEG NFR BLD: 91 % (ref 32–75)
NITRITE UR QL STRIP.AUTO: POSITIVE
NRBC # BLD: 0 K/UL (ref 0–0.01)
NRBC BLD-RTO: 0 PER 100 WBC
PH UR STRIP: 6 (ref 5–8)
PLATELET # BLD AUTO: 243 K/UL (ref 150–400)
PMV BLD AUTO: 9.2 FL (ref 8.9–12.9)
POTASSIUM SERPL-SCNC: 3.9 MMOL/L (ref 3.5–5.1)
PROCALCITONIN SERPL-MCNC: 3.03 NG/ML
PROT SERPL-MCNC: 6.3 G/DL (ref 6.4–8.2)
PROT UR STRIP-MCNC: 100 MG/DL
RBC # BLD AUTO: 4.59 M/UL (ref 4.1–5.7)
RBC #/AREA URNS HPF: >100 /HPF (ref 0–5)
RBC MORPH BLD: ABNORMAL
SERVICE CMNT-IMP: ABNORMAL
SERVICE CMNT-IMP: NORMAL
SERVICE CMNT-IMP: NORMAL
SODIUM SERPL-SCNC: 132 MMOL/L (ref 136–145)
SP GR UR REFRACTOMETRY: 1.02
TROPONIN I SERPL HS-MCNC: 22 NG/L (ref 0–76)
URINE CULTURE IF INDICATED: ABNORMAL
UROBILINOGEN UR QL STRIP.AUTO: 1 EU/DL (ref 0.2–1)
WBC # BLD AUTO: 25.4 K/UL (ref 4.1–11.1)
WBC URNS QL MICRO: ABNORMAL /HPF (ref 0–4)

## 2023-08-13 PROCEDURE — 6370000000 HC RX 637 (ALT 250 FOR IP): Performed by: STUDENT IN AN ORGANIZED HEALTH CARE EDUCATION/TRAINING PROGRAM

## 2023-08-13 PROCEDURE — 2580000003 HC RX 258: Performed by: INTERNAL MEDICINE

## 2023-08-13 PROCEDURE — 2060000000 HC ICU INTERMEDIATE R&B

## 2023-08-13 PROCEDURE — 6360000002 HC RX W HCPCS: Performed by: INTERNAL MEDICINE

## 2023-08-13 PROCEDURE — 6370000000 HC RX 637 (ALT 250 FOR IP): Performed by: INTERNAL MEDICINE

## 2023-08-13 RX ORDER — ACETAMINOPHEN 325 MG/1
650 TABLET ORAL EVERY 6 HOURS PRN
Status: DISCONTINUED | OUTPATIENT
Start: 2023-08-13 | End: 2023-08-15 | Stop reason: HOSPADM

## 2023-08-13 RX ORDER — ENOXAPARIN SODIUM 100 MG/ML
30 INJECTION SUBCUTANEOUS 2 TIMES DAILY
Status: DISCONTINUED | OUTPATIENT
Start: 2023-08-13 | End: 2023-08-15 | Stop reason: HOSPADM

## 2023-08-13 RX ORDER — HYDRALAZINE HYDROCHLORIDE 20 MG/ML
20 INJECTION INTRAMUSCULAR; INTRAVENOUS EVERY 6 HOURS PRN
Status: DISCONTINUED | OUTPATIENT
Start: 2023-08-13 | End: 2023-08-15 | Stop reason: HOSPADM

## 2023-08-13 RX ORDER — LOSARTAN POTASSIUM 50 MG/1
50 TABLET ORAL DAILY
Status: DISCONTINUED | OUTPATIENT
Start: 2023-08-13 | End: 2023-08-15 | Stop reason: HOSPADM

## 2023-08-13 RX ORDER — AMLODIPINE BESYLATE 5 MG/1
5 TABLET ORAL DAILY
Status: DISCONTINUED | OUTPATIENT
Start: 2023-08-13 | End: 2023-08-15 | Stop reason: HOSPADM

## 2023-08-13 RX ORDER — POLYETHYLENE GLYCOL 3350 17 G/17G
17 POWDER, FOR SOLUTION ORAL DAILY PRN
Status: DISCONTINUED | OUTPATIENT
Start: 2023-08-13 | End: 2023-08-15 | Stop reason: HOSPADM

## 2023-08-13 RX ORDER — SODIUM CHLORIDE 9 MG/ML
INJECTION, SOLUTION INTRAVENOUS PRN
Status: DISCONTINUED | OUTPATIENT
Start: 2023-08-13 | End: 2023-08-15 | Stop reason: HOSPADM

## 2023-08-13 RX ORDER — SODIUM CHLORIDE 0.9 % (FLUSH) 0.9 %
5-40 SYRINGE (ML) INJECTION EVERY 12 HOURS SCHEDULED
Status: DISCONTINUED | OUTPATIENT
Start: 2023-08-13 | End: 2023-08-15 | Stop reason: HOSPADM

## 2023-08-13 RX ORDER — SODIUM CHLORIDE 0.9 % (FLUSH) 0.9 %
5-40 SYRINGE (ML) INJECTION PRN
Status: DISCONTINUED | OUTPATIENT
Start: 2023-08-13 | End: 2023-08-15 | Stop reason: HOSPADM

## 2023-08-13 RX ORDER — LEVOTHYROXINE SODIUM 0.1 MG/1
100 TABLET ORAL
Status: DISCONTINUED | OUTPATIENT
Start: 2023-08-13 | End: 2023-08-15 | Stop reason: HOSPADM

## 2023-08-13 RX ORDER — ACETAMINOPHEN 500 MG
1000 TABLET ORAL ONCE
Status: COMPLETED | OUTPATIENT
Start: 2023-08-13 | End: 2023-08-13

## 2023-08-13 RX ORDER — DEXTRAN 70/HYPROMELLOSE
1 DROPS OPHTHALMIC (EYE) PRN
Status: DISCONTINUED | OUTPATIENT
Start: 2023-08-13 | End: 2023-08-15 | Stop reason: HOSPADM

## 2023-08-13 RX ORDER — PRAVASTATIN SODIUM 10 MG
20 TABLET ORAL DAILY
Status: DISCONTINUED | OUTPATIENT
Start: 2023-08-13 | End: 2023-08-15 | Stop reason: HOSPADM

## 2023-08-13 RX ORDER — ACETAMINOPHEN 650 MG/1
650 SUPPOSITORY RECTAL EVERY 6 HOURS PRN
Status: DISCONTINUED | OUTPATIENT
Start: 2023-08-13 | End: 2023-08-15 | Stop reason: HOSPADM

## 2023-08-13 RX ADMIN — SODIUM CHLORIDE, PRESERVATIVE FREE 10 ML: 5 INJECTION INTRAVENOUS at 21:07

## 2023-08-13 RX ADMIN — LEVOTHYROXINE SODIUM 100 MCG: 0.1 TABLET ORAL at 07:10

## 2023-08-13 RX ADMIN — ACETAMINOPHEN 650 MG: 325 TABLET ORAL at 19:51

## 2023-08-13 RX ADMIN — ACETAMINOPHEN 650 MG: 325 TABLET ORAL at 08:51

## 2023-08-13 RX ADMIN — ENOXAPARIN SODIUM 30 MG: 30 INJECTION SUBCUTANEOUS at 21:07

## 2023-08-13 RX ADMIN — CEFEPIME 2000 MG: 2 INJECTION, POWDER, FOR SOLUTION INTRAVENOUS at 18:14

## 2023-08-13 RX ADMIN — SODIUM CHLORIDE, PRESERVATIVE FREE 10 ML: 5 INJECTION INTRAVENOUS at 08:52

## 2023-08-13 RX ADMIN — Medication 2500 MG: at 13:08

## 2023-08-13 RX ADMIN — LOSARTAN POTASSIUM 50 MG: 50 TABLET, FILM COATED ORAL at 09:02

## 2023-08-13 RX ADMIN — DEXTRAN 70, GLYCERIN, HYPROMELLOSE 1 DROP: 1; 2; 3 SOLUTION/ DROPS OPHTHALMIC at 21:26

## 2023-08-13 RX ADMIN — AMLODIPINE BESYLATE 5 MG: 5 TABLET ORAL at 08:50

## 2023-08-13 RX ADMIN — ENOXAPARIN SODIUM 30 MG: 30 INJECTION SUBCUTANEOUS at 08:51

## 2023-08-13 RX ADMIN — ACETAMINOPHEN 1000 MG: 500 TABLET ORAL at 00:26

## 2023-08-13 RX ADMIN — PRAVASTATIN SODIUM 20 MG: 10 TABLET ORAL at 08:50

## 2023-08-13 RX ADMIN — Medication 2500 MG: at 08:43

## 2023-08-13 RX ADMIN — CEFEPIME 2000 MG: 2 INJECTION, POWDER, FOR SOLUTION INTRAVENOUS at 07:11

## 2023-08-13 ASSESSMENT — PAIN DESCRIPTION - ORIENTATION
ORIENTATION: LEFT
ORIENTATION: ANTERIOR

## 2023-08-13 ASSESSMENT — PAIN DESCRIPTION - DESCRIPTORS
DESCRIPTORS: ACHING
DESCRIPTORS: ACHING

## 2023-08-13 ASSESSMENT — PAIN SCALES - GENERAL
PAINLEVEL_OUTOF10: 3
PAINLEVEL_OUTOF10: 3

## 2023-08-13 ASSESSMENT — PAIN DESCRIPTION - LOCATION
LOCATION: FLANK
LOCATION: HEAD

## 2023-08-13 ASSESSMENT — PAIN DESCRIPTION - PAIN TYPE: TYPE: ACUTE PAIN

## 2023-08-13 NOTE — PROGRESS NOTES
Nonbillable update    Saw patient at bedside, states he is feeling better. Appreciate Urology input. Is growing Enterococcus in urine, sensi's pending. Agree with rest of plan per Dr. Alma Rosa Peck.     Salo Gaytan MD

## 2023-08-13 NOTE — ED NOTES
Acetaminophen pulled from [facility] and handed to [facility]. for administration.       Irina Burdick  08/13/23 [id number]

## 2023-08-13 NOTE — ED PROVIDER NOTES
[facility] EMERGENCY DEPT  EMERGENCY DEPARTMENT ENCOUNTER       Pt Name: Davida Kothari  MRN: [id number]  [address] [facility] 1955  Date of evaluation: 8/12/2023  Provider: Flavia Raman MD   PCP: Shane Lazaro MD  Note Started: 11:22 PM EDT 8/12/23     CHIEF COMPLAINT       Chief Complaint   Patient presents with    Fever     Pt reports headache, shoulder pain, and fever starting this morning. Pt had stent placed on Friday for a UTI. Pt reported fever at home, 102.6 in triage with tachycardia noted. Pt seen this morning and suspected sepsis, patient had CT scan completed prior to discharge. HISTORY OF PRESENT ILLNESS: 1 or more elements      History From: patient, History limited by: none     Davida Kothari is a 75 y.o. male presenting with SOB, back pain. Notes it started yesterday and was seen in the ED earlier. He got antibiotics and felt ok to go home, though at home got worseningly SOB with back pain with fever. Urinating OK, Denies CP. No HA or dizziness. Please See MDM for Additional Details of the HPI/PMH  Nursing Notes were all reviewed and agreed with or any disagreements were addressed in the HPI. REVIEW OF SYSTEMS        Positives and Pertinent negatives as per HPI.     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

## 2023-08-13 NOTE — H&P
Hospitalist Admission Note    NAME:   Josee Butts   : 1955   MRN: [id number]     Date/Time: 2023 1:10 AM    Patient PCP: Edison Dakins, MD    ______________________________________________________________________  Given the patient's current clinical presentation, I have a high level of concern for decompensation if discharged from the emergency department. Complex decision making was performed, which includes reviewing the patient's available past medical records, laboratory results, and x-ray films. My assessment of this patient's clinical condition and my plan of care is as follows.     Assessment / Plan:    Severe sepsis POA WBC 25.4, temp 102.6, , RR 30, lactate 2.53  Left pyelonephritis POA  Left nephrolithiasis s/p laser procedure and left ureteral stent 2023  Doing well prior to procedure  at [facility]  After the procedure until overnight   Rigors, fevers, increasing SOB, + low back pain  Seen in ED early AM    Febrile 102.6, , RR 34,    UA cloudy, nitrite +, 20-50 WBC, 20-50 RBC, 1+ Bacteria   CTA chest with SOB      No ASD, no Edema, no PE   CT abdomen/pelvis     Left ureteral stent in good position    No left hydronephrosis    Colonic diverticulosis   Blood and urine culture sent   IVF, IV ceftriaxone   Felt better, discharged home on cefdinir  Back feeling bad, still tachypneic, recurrent fevers  Back to ED  Admit  Hold further IVF, received IVF bolus in ED    Still tachypneic   Repeat pCXR with no edema or ASD  Procalcitonin 3.03  Continue IV ceftriaxone  Follow up cultures  Serial labs  Ask urology to see to check stent    Essential HTN POA  Hyperlipidemia POA  Continue norvasc and losartan(reduce to 50 mg)   Hold HCTZ  PRN hydralazine  Continue statin    Hypothyroidism POA  Continue replacement    Medical Decision Making:   I personally reviewed labs: CBC, CMP, UA  I personally reviewed imaging: pCXR  I personally reviewed EKG:  Toxic drug

## 2023-08-13 NOTE — PROGRESS NOTES
TRANSFER - IN REPORT:    Verbal report received from [place], [place] on Chino Arana  being received from ED for routine progression of patient care      Report consisted of patient's Situation, Background, Assessment and   Recommendations(SBAR). Information from the following report(s) Nurse Handoff Report, Recent Results, Cardiac Rhythm NSR, and Quality Measures was reviewed with the receiving nurse. Opportunity for questions and clarification was provided. Assessment completed upon patient's arrival to unit and care assumed. 1245--Urology consult called. 1315--Talked to Dr. Baltazar Naidu (with urology). He was agreeable to the current plan of antibiotics. 1625--Messaged Dr. Garima Dumont about patient's ST segment looking a little depressed but looks the same as previous EKG done yesterday. 1633--Dr. Mendel asked if patient having any chest pain. Patient denied chest pain. No new orders given. 1634--Per Dr. Garima Dumont, can order PRN Miralax for patient. 1818--Patient asking for eye drops. Messaged Dr. Garima Dumont. End of Shift Note    Bedside shift change report given to Kamar Gracia RN (oncoming nurse) by Elva Guzman RN (offgoing nurse). Report included the following information SBAR, Kardex, Recent Results, Cardiac Rhythm NSR, and Quality Measures    Shift worked:  0409-9874     Shift summary and any significant changes:     Had two bowel movements today. Complaining of burning with urination. Urine is now yellow/straw instead of orange in color. PRN Tylenol given for headache.    Concerns for physician to address:       Zone phone for oncoming shift:   [phone]       Activity:     Number times ambulated in hallways past shift: 0  Number of times OOB to chair past shift: 1    Cardiac:   Cardiac Monitoring: Yes           Access:  Current line(s): PIV     Genitourinary:   Urinary status: voiding    Respiratory:      Chronic home O2 use?: NO  Incentive spirometer at bedside: NO       GI:     Current diet:  ADULT DIET;  Regular; Low Fat/Low Chol/High Fiber/JOSE M  DIET ONE TIME MESSAGE;  Passing flatus: YES  Tolerating current diet: YES       Pain Management:   Patient states pain is manageable on current regimen: YES    Skin:     Interventions: float heels, limit briefs, and nutritional support    Patient Safety:  Fall Score:    Interventions: bed/chair alarm, gripper socks, and pt to call before getting OOB       Length of Stay:  Expected LOS: 3  Actual LOS: 0      Marii Ladd RN

## 2023-08-13 NOTE — ED NOTES
Report received from Kevin Rogers RN. PRN acetaminophen returned d/t order parameters      Milagros Mathew RN  08/13/23 4142    Attempted to call [facility] consult without answer.  Will notify inpatient RN of pending consult      Milagros Mathew RN  08/13/23 1641    Vancomycin administration delayed d/t IV access, ED tech to obtain second Tammie Ruiz RN  08/13/23 0802    PCU RN at bedside for transport, vancomycin sent with KIA Mathew RN  08/13/23 1860

## 2023-08-13 NOTE — PROGRESS NOTES
4 Eyes Skin Assessment     NAME:  Dusty Rg OF BIRTH:  1955  MEDICAL RECORD NUMBER:  [id number]    The patient is being assessed for  Admission    I agree that at least one RN has performed a thorough Head to Toe Skin Assessment on the patient. ALL assessment sites listed below have been assessed. Areas assessed by both nurses:    Head, Face, Ears, Shoulders, Back, Chest, Arms, Elbows, Hands, Sacrum. Buttock, Coccyx, Ischium, Legs. Feet and Heels, and Under Medical Devices         Does the Patient have a Wound?  No noted wound(s)       German Prevention initiated by RN: Yes  Wound Care Orders initiated by RN: Yes    Pressure Injury (Stage 3,4, Unstageable, DTI, NWPT, and Complex wounds) if present, place Wound referral order by RN under : No    New Ostomies, if present place, Ostomy referral order under : No     Nurse 1 eSignature: Electronically signed by Francisco Javier Price RN on 8/13/23 at 9:01 AM EDT    **SHARE this note so that the co-signing nurse can place an eSignature**    Nurse 2 eSignature: Electronically signed by Yarely Cardoso RN on 8/13/23 at 4:36 PM EDT

## 2023-08-13 NOTE — ED NOTES
This RN attempted to call transfer report at 07:20 and spoke with Al, PCU Charge RN. Al refused report due to \"it's change of shift right now. \" Requires us to call back. Care transitioned to Landon Parra., ED RN at this time. Comprehensive SBAR given, all questions answered.       Richelle Mujica RN  08/13/23 [address], RN  08/13/23 7280

## 2023-08-13 NOTE — CONSULTS
and 2 pills 1 hour after dental procedure 7/11/23   Diane Xavier MD   amLODIPine (NORVASC) 10 MG tablet Take 5 mg by mouth daily    Ar Automatic Reconciliation   aspirin 81 MG EC tablet Take 81 mg by mouth daily    Ar Automatic Reconciliation   vitamin D 25 MCG (1000 UT) CAPS Take 1,000 Units by mouth daily    Ar Automatic Reconciliation   clindamycin (CLEOCIN) 300 MG capsule Take 2 pills 1 hour prior to dental procedures and take 2 pills 1 hour after dental procedure 1/10/23   Ar Automatic Reconciliation   hydroCHLOROthiazide (HYDRODIURIL) 25 MG tablet Take 25 mg by mouth daily    Ar Automatic Reconciliation   levothyroxine (SYNTHROID) 100 MCG tablet Take 100 mcg by mouth every morning (before breakfast)    Ar Automatic Reconciliation   losartan (COZAAR) 100 MG tablet Take 100 mg by mouth daily    Ar Automatic Reconciliation   pravastatin (PRAVACHOL) 20 MG tablet Take 20 mg by mouth daily    Ar Automatic Reconciliation   triamcinolone (ARISTOCORT) 0.5 % ointment Apply topically daily as needed    Ar Automatic Reconciliation      PMHx:  has a past medical history of Arthritis, At risk for sleep apnea, Cancer ([address]), Chronic pain, GERD (gastroesophageal reflux disease), High cholesterol, Hypertension, Hypothyroid, and 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 PSurgHx:  has a past surgical history that includes Tonsillectomy (CHILD); Adenoidectomy (CHILD); Total shoulder arthroplasty (Right, 06/2020); Colonoscopy (2014); malignant skin lesion excision (Right, 05/2020); Thyroidectomy (12/2006); colonoscopy,bebe flores (10/23/2020); Colonoscopy (N/A, 10/23/2020); Total knee arthroplasty (Left); and [place] tooth extraction. PSocHx:  reports that he has never smoked. He has never used smokeless tobacco. He reports that he does not currently use alcohol. He reports that he does not use drugs.    ROS:  Admission ROS by Marine Coley MD from 8/12/2023 were reviewed with the patient and changes (other

## 2023-08-14 LAB
ANION GAP SERPL CALC-SCNC: 7 MMOL/L (ref 5–15)
BACTERIA SPEC CULT: ABNORMAL
BASOPHILS # BLD: 0 K/UL (ref 0–0.1)
BASOPHILS NFR BLD: 0 % (ref 0–1)
BUN SERPL-MCNC: 18 MG/DL (ref 6–20)
BUN/CREAT SERPL: 16 (ref 12–20)
CALCIUM SERPL-MCNC: 9 MG/DL (ref 8.5–10.1)
CC UR VC: ABNORMAL
CHLORIDE SERPL-SCNC: 106 MMOL/L (ref 97–108)
CO2 SERPL-SCNC: 23 MMOL/L (ref 21–32)
CREAT SERPL-MCNC: 1.15 MG/DL (ref 0.7–1.3)
DIFFERENTIAL METHOD BLD: ABNORMAL
EOSINOPHIL # BLD: 0.2 K/UL (ref 0–0.4)
EOSINOPHIL NFR BLD: 1 % (ref 0–7)
ERYTHROCYTE [DISTWIDTH] IN BLOOD BY AUTOMATED COUNT: 13.8 % (ref 11.5–14.5)
GLUCOSE SERPL-MCNC: 108 MG/DL (ref 65–100)
HCT VFR BLD AUTO: 38.9 % (ref 36.6–50.3)
HGB BLD-MCNC: 13 G/DL (ref 12.1–17)
IMM GRANULOCYTES # BLD AUTO: 0.2 K/UL (ref 0–0.04)
IMM GRANULOCYTES NFR BLD AUTO: 1 % (ref 0–0.5)
LYMPHOCYTES # BLD: 1 K/UL (ref 0.8–3.5)
LYMPHOCYTES NFR BLD: 4 % (ref 12–49)
MAGNESIUM SERPL-MCNC: 2 MG/DL (ref 1.6–2.4)
MCH RBC QN AUTO: 30.4 PG (ref 26–34)
MCHC RBC AUTO-ENTMCNC: 33.4 G/DL (ref 30–36.5)
MCV RBC AUTO: 91.1 FL (ref 80–99)
MONOCYTES # BLD: 1.7 K/UL (ref 0–1)
MONOCYTES NFR BLD: 7 % (ref 5–13)
NEUTS SEG # BLD: 20.8 K/UL (ref 1.8–8)
NEUTS SEG NFR BLD: 87 % (ref 32–75)
NRBC # BLD: 0 K/UL (ref 0–0.01)
NRBC BLD-RTO: 0 PER 100 WBC
NT PRO BNP: 350 PG/ML
PHOSPHATE SERPL-MCNC: 2.5 MG/DL (ref 2.6–4.7)
PLATELET # BLD AUTO: 221 K/UL (ref 150–400)
PMV BLD AUTO: 9.3 FL (ref 8.9–12.9)
POTASSIUM SERPL-SCNC: 3.4 MMOL/L (ref 3.5–5.1)
PROCALCITONIN SERPL-MCNC: 3.67 NG/ML
RBC # BLD AUTO: 4.27 M/UL (ref 4.1–5.7)
RBC MORPH BLD: ABNORMAL
SERVICE CMNT-IMP: ABNORMAL
SODIUM SERPL-SCNC: 136 MMOL/L (ref 136–145)
WBC # BLD AUTO: 23.9 K/UL (ref 4.1–11.1)

## 2023-08-14 PROCEDURE — 6360000002 HC RX W HCPCS: Performed by: INTERNAL MEDICINE

## 2023-08-14 PROCEDURE — 2060000000 HC ICU INTERMEDIATE R&B

## 2023-08-14 PROCEDURE — 6370000000 HC RX 637 (ALT 250 FOR IP): Performed by: INTERNAL MEDICINE

## 2023-08-14 PROCEDURE — 84145 PROCALCITONIN (PCT): CPT

## 2023-08-14 PROCEDURE — 36415 COLL VENOUS BLD VENIPUNCTURE: CPT

## 2023-08-14 PROCEDURE — 85025 COMPLETE CBC W/AUTO DIFF WBC: CPT

## 2023-08-14 PROCEDURE — 6370000000 HC RX 637 (ALT 250 FOR IP): Performed by: STUDENT IN AN ORGANIZED HEALTH CARE EDUCATION/TRAINING PROGRAM

## 2023-08-14 PROCEDURE — 2580000003 HC RX 258: Performed by: INTERNAL MEDICINE

## 2023-08-14 PROCEDURE — 84100 ASSAY OF PHOSPHORUS: CPT

## 2023-08-14 PROCEDURE — 83880 ASSAY OF NATRIURETIC PEPTIDE: CPT

## 2023-08-14 PROCEDURE — 83735 ASSAY OF MAGNESIUM: CPT

## 2023-08-14 PROCEDURE — 80048 BASIC METABOLIC PNL TOTAL CA: CPT

## 2023-08-14 RX ORDER — POTASSIUM CHLORIDE 750 MG/1
20 TABLET, FILM COATED, EXTENDED RELEASE ORAL ONCE
Status: COMPLETED | OUTPATIENT
Start: 2023-08-14 | End: 2023-08-14

## 2023-08-14 RX ADMIN — DEXTRAN 70, GLYCERIN, HYPROMELLOSE 1 DROP: 1; 2; 3 SOLUTION/ DROPS OPHTHALMIC at 06:41

## 2023-08-14 RX ADMIN — SODIUM CHLORIDE, PRESERVATIVE FREE 10 ML: 5 INJECTION INTRAVENOUS at 21:35

## 2023-08-14 RX ADMIN — PRAVASTATIN SODIUM 20 MG: 10 TABLET ORAL at 08:18

## 2023-08-14 RX ADMIN — POTASSIUM CHLORIDE 20 MEQ: 750 TABLET, FILM COATED, EXTENDED RELEASE ORAL at 10:56

## 2023-08-14 RX ADMIN — CEFEPIME 2000 MG: 2 INJECTION, POWDER, FOR SOLUTION INTRAVENOUS at 06:30

## 2023-08-14 RX ADMIN — ENOXAPARIN SODIUM 30 MG: 30 INJECTION SUBCUTANEOUS at 21:33

## 2023-08-14 RX ADMIN — LOSARTAN POTASSIUM 50 MG: 50 TABLET, FILM COATED ORAL at 08:18

## 2023-08-14 RX ADMIN — VANCOMYCIN HYDROCHLORIDE 1250 MG: 10 INJECTION, POWDER, LYOPHILIZED, FOR SOLUTION INTRAVENOUS at 10:56

## 2023-08-14 RX ADMIN — ACETAMINOPHEN 650 MG: 325 TABLET ORAL at 06:40

## 2023-08-14 RX ADMIN — AMLODIPINE BESYLATE 5 MG: 5 TABLET ORAL at 08:18

## 2023-08-14 RX ADMIN — SODIUM CHLORIDE, PRESERVATIVE FREE 10 ML: 5 INJECTION INTRAVENOUS at 08:18

## 2023-08-14 RX ADMIN — ENOXAPARIN SODIUM 30 MG: 30 INJECTION SUBCUTANEOUS at 08:17

## 2023-08-14 RX ADMIN — DEXTRAN 70, GLYCERIN, HYPROMELLOSE 1 DROP: 1; 2; 3 SOLUTION/ DROPS OPHTHALMIC at 18:32

## 2023-08-14 RX ADMIN — DEXTRAN 70, GLYCERIN, HYPROMELLOSE 1 DROP: 1; 2; 3 SOLUTION/ DROPS OPHTHALMIC at 21:37

## 2023-08-14 RX ADMIN — LEVOTHYROXINE SODIUM 100 MCG: 0.1 TABLET ORAL at 06:30

## 2023-08-14 ASSESSMENT — PAIN DESCRIPTION - DESCRIPTORS: DESCRIPTORS: ACHING;DISCOMFORT;DULL

## 2023-08-14 ASSESSMENT — PAIN DESCRIPTION - ORIENTATION: ORIENTATION: LEFT

## 2023-08-14 ASSESSMENT — PAIN SCALES - GENERAL
PAINLEVEL_OUTOF10: 0
PAINLEVEL_OUTOF10: 4

## 2023-08-14 ASSESSMENT — PAIN DESCRIPTION - LOCATION: LOCATION: FLANK

## 2023-08-14 NOTE — PLAN OF CARE
Problem: Skin/Tissue Integrity  Goal: Absence of new skin breakdown  Description: 1. Monitor for areas of redness and/or skin breakdown  2. Assess vascular access sites hourly  3. Every 4-6 hours minimum:  Change oxygen saturation probe site  4. Every 4-6 hours:  If on nasal continuous positive airway pressure, respiratory therapy assess nares and determine need for appliance change or resting period.   Outcome: Progressing     Problem: Pain  Goal: Verbalizes/displays adequate comfort level or baseline comfort level  8/13/2023 1336 by Teja Colby RN  Outcome: Progressing

## 2023-08-14 NOTE — PROGRESS NOTES
..End of Shift Note    Bedside shift change report given to Laura Pride RN(oncoming nurse) by Marie Gregg RN (offgoing nurse). Report included the following information SBAR    Shift worked:  0700 - 1900     Shift summary and any significant changes:    Pt. Tolerated all medications w/o issue. No c/o pain or distress. Wife at bedside during day shift. Ambulated to bathroom independently. Concerns for physician to address: No     Zone phone for oncoming shift:  No       Activity:     Number times ambulated in hallways past shift: 0  Number of times OOB to chair past shift: 2    Cardiac:   Cardiac Monitoring: Yes           Access:  Current line(s): PIV     Genitourinary:   Urinary status: voiding    Respiratory:      Chronic home O2 use?: NO  Incentive spirometer at bedside: YES       GI:     Current diet:  ADULT DIET;  Regular; Low Fat/Low Chol/High Fiber/JOSE M  DIET ONE TIME MESSAGE;  Passing flatus: YES  Tolerating current diet: YES       Pain Management:   Patient states pain is manageable on current regimen: YES    Skin:     Interventions: increase time out of bed    Patient Safety:  Fall Score:    Interventions: gripper socks and pt to call before getting OOB       Length of Stay:  Expected LOS: 3  Actual LOS: 1      Marie Gregg RN

## 2023-08-14 NOTE — PROGRESS NOTES
Hospitalist Progress Note    NAME:   Mathew Mcmahon   : 1955   MRN: [id number]     Date/Time: 2023 2:48 PM  Patient PCP: Nikky Ca MD    Estimated discharge date: 8/15  Barriers: leukocytosis improvement      Assessment / Plan:    Severe sepsis POA WBC 25.4, temp 102.6, , RR 30, lactate 2.53  Left E faecalis pyelonephritis POA  Left nephrolithiasis   s/p laser procedure and left ureteral stent 2023  Doing well prior to procedure  at [facility]  After the procedure until overnight              Rigors, fevers, increasing SOB, + low back pain  Seen in ED early AM               Febrile 102.6, , RR 34,               UA cloudy, nitrite +, 20-50 WBC, 20-50 RBC, 1+ Bacteria              CTA chest with SOB                             No ASD, no Edema, no PE              CT abdomen/pelvis                           Left ureteral stent in good position                          No left hydronephrosis                          Colonic diverticulosis              Blood and urine culture sent              IVF, IV ceftriaxone              Felt better, discharged home on cefdinir  Back feeling bad, still tachypneic, recurrent fevers  Back to ED  Admit  Hold further IVF, received IVF bolus in ED               Still tachypneic              Repeat pCXR with no edema or ASD  Procalcitonin 3.03  Serial labs  Urology following  - continue Vanc for E faecalis     Essential HTN POA  Hyperlipidemia POA  Continue norvasc and losartan(reduce to 50 mg)              Hold HCTZ  PRN hydralazine  Continue statin     Hypothyroidism POA  Continue replacement     Medical Decision Making:   I personally reviewed labs: CBC, CMP, urine cultures  I personally reviewed imaging:   I personally reviewed EKG:  Toxic drug monitoring:   Discussed case with:          Code Status: full code  DVT Prophylaxis: lovenox  GI Prophylaxis:  Baseline:     Subjective:     Chief Complaint / Reason for reviewed and discharge planning was addressed. ________________________________________________________________________  Total NON critical care TIME:      Total CRITICAL CARE TIME Spent:   Minutes non procedure based      Comments   >50% of visit spent in counseling and coordination of care     ________________________________________________________________________    Procedures: see electronic medical records for all procedures/Xrays and details which were not copied into this note but were reviewed prior to creation of Plan. LABS:  I reviewed today's most current labs and imaging studies.   Pertinent labs include:  Recent Labs     08/12/23  1034 08/12/23  2321 08/14/23  0329   WBC 23.8* 25.4* 23.9*   HGB 14.4 14.1 13.0   HCT 42.5 41.5 38.9    243 221     Recent Labs     08/12/23  1034 08/12/23  2321 08/14/23  0329   * 132* 136   K 3.5 3.9 3.4*   CL 99 99 106   CO2 26 26 23   GLUCOSE 113* 107* 108*   BUN 19 23* 18   CREATININE 1.16 1.39* 1.15   CALCIUM 9.5 9.3 9.0   MG  --   --  2.0   PHOS  --   --  2.5*   LABALBU 3.5 3.7  --    BILITOT 0.5 0.5  --    AST 13* 30  --    ALT 36 37  --        Signed: Stella Fish MD

## 2023-08-14 NOTE — CARE COORDINATION
Care Management Initial Assessment       RUR: 10%  Readmission? No  1st IM letter given? Yes   1st  letter given: No      CM introduced self and role to pt and Wife, Daxa Ours at bedtime, verified pt demographics, insurance info,emergency contact and ACD- not on file. DME: none  HH: in the past  SNF:   none    Independent with ADL's, ambulating and driving  No CM needs identified at assessment. 08/14/23 1217   Service Assessment   Patient Orientation Alert and Oriented   Cognition Alert   History Provided By Patient   Primary Caregiver Self   Support Systems Spouse/Significant Other  (Wife- Monet Oshea)   [address] is: Legal Next of Kin   PCP Verified by CM Yes   Prior Functional Level Independent in ADLs/IADLs   Current Functional Level Independent in ADLs/IADLs   Ability to make needs known: Good   Family able to assist with home care needs: Yes   Would you like for me to discuss the discharge plan with any other family members/significant others, and if so, who? Yes   Financial Resources Medicare; Other (Comment)  (Has supplement0 [place] Medicare)   Social/Functional History   Lives With Spouse   Type of [address] [place] [address]  One level   Home Equipment None   Receives Help From [facility]   Ambulation Assistance Independent   Transfer Assistance Independent   Active  Yes   Mode of Transportation Car   Discharge Planning   Living Arrangements Spouse/Significant Other   Current Services Prior To Admission None   DME Ordered?  No   Patient expects to be discharged to: [address] [place] [address]  Phone: [phone]

## 2023-08-14 NOTE — PLAN OF CARE
Problem: Discharge Planning  Goal: Discharge to home or other facility with appropriate resources  Outcome: Progressing     Problem: Pain  Goal: Verbalizes/displays adequate comfort level or baseline comfort level  Outcome: Progressing     Problem: Safety - Adult  Goal: Free from fall injury  Outcome: Progressing     Problem: ABCDS Injury Assessment  Goal: Absence of physical injury  Outcome: Progressing     Problem: Skin/Tissue Integrity  Goal: Absence of new skin breakdown  Description: 1. Monitor for areas of redness and/or skin breakdown  2. Assess vascular access sites hourly  3. Every 4-6 hours minimum:  Change oxygen saturation probe site  4. Every 4-6 hours:  If on nasal continuous positive airway pressure, respiratory therapy assess nares and determine need for appliance change or resting period.   8/14/2023 0800 by Becky Morley RN  Outcome: Progressing  8/14/2023 0101 by Delbert Hernandez RN  Outcome: Progressing

## 2023-08-14 NOTE — PROGRESS NOTES
End of Shift Note    Bedside shift change report given to Dusty Pool RN,  (oncoming nurse) by Josh Hill RN (offgoing nurse). Report included the following information SBAR, Kardex, Intake/Output, MAR, and Recent Results    Shift worked:  night     Shift summary and any significant changes:    No significant changes     Concerns for physician to address: none     Zone phone for oncoming shift:       Activity:     Number times ambulated in hallways past shift: 0  Number of times OOB to chair past shift: 0    Cardiac:   Cardiac Monitoring: Yes           Access:  Current line(s): PIV     Genitourinary:   Urinary status: voiding    Respiratory:      Chronic home O2 use?: NO  Incentive spirometer at bedside: YES       GI:     Current diet:  ADULT DIET; Regular; Low Fat/Low Chol/High Fiber/JOSE M  DIET ONE TIME MESSAGE;  Passing flatus: YES  Tolerating current diet: YES       Pain Management:   Patient states pain is manageable on current regimen: YES    Skin:     Interventions: float heels, increase time out of bed, and PT/OT consult    Patient Safety:  Fall Score:    Interventions: assistive device (walker, cane.  etc), gripper socks, and pt to call before getting OOB       Length of Stay:  Expected LOS: 3  Actual LOS: [address] RN

## 2023-08-15 VITALS
HEART RATE: 85 BPM | RESPIRATION RATE: 16 BRPM | HEIGHT: 69 IN | OXYGEN SATURATION: 93 % | TEMPERATURE: 97.7 F | DIASTOLIC BLOOD PRESSURE: 69 MMHG | SYSTOLIC BLOOD PRESSURE: 141 MMHG | WEIGHT: 290.57 LBS | BODY MASS INDEX: 43.04 KG/M2

## 2023-08-15 LAB
ANION GAP SERPL CALC-SCNC: 6 MMOL/L (ref 5–15)
BASOPHILS # BLD: 0.1 K/UL (ref 0–0.1)
BASOPHILS NFR BLD: 0 % (ref 0–1)
BUN SERPL-MCNC: 16 MG/DL (ref 6–20)
BUN/CREAT SERPL: 15 (ref 12–20)
CALCIUM SERPL-MCNC: 8.9 MG/DL (ref 8.5–10.1)
CHLORIDE SERPL-SCNC: 106 MMOL/L (ref 97–108)
CO2 SERPL-SCNC: 24 MMOL/L (ref 21–32)
CREAT SERPL-MCNC: 1.09 MG/DL (ref 0.7–1.3)
DIFFERENTIAL METHOD BLD: ABNORMAL
EOSINOPHIL # BLD: 0.6 K/UL (ref 0–0.4)
EOSINOPHIL NFR BLD: 3 % (ref 0–7)
ERYTHROCYTE [DISTWIDTH] IN BLOOD BY AUTOMATED COUNT: 13.5 % (ref 11.5–14.5)
GLUCOSE SERPL-MCNC: 94 MG/DL (ref 65–100)
HCT VFR BLD AUTO: 37.8 % (ref 36.6–50.3)
HGB BLD-MCNC: 12.7 G/DL (ref 12.1–17)
IMM GRANULOCYTES # BLD AUTO: 0.1 K/UL (ref 0–0.04)
IMM GRANULOCYTES NFR BLD AUTO: 1 % (ref 0–0.5)
LYMPHOCYTES # BLD: 1 K/UL (ref 0.8–3.5)
LYMPHOCYTES NFR BLD: 6 % (ref 12–49)
MAGNESIUM SERPL-MCNC: 2.3 MG/DL (ref 1.6–2.4)
MCH RBC QN AUTO: 30.6 PG (ref 26–34)
MCHC RBC AUTO-ENTMCNC: 33.6 G/DL (ref 30–36.5)
MCV RBC AUTO: 91.1 FL (ref 80–99)
MONOCYTES # BLD: 1.2 K/UL (ref 0–1)
MONOCYTES NFR BLD: 7 % (ref 5–13)
NEUTS SEG # BLD: 14.4 K/UL (ref 1.8–8)
NEUTS SEG NFR BLD: 83 % (ref 32–75)
NRBC # BLD: 0 K/UL (ref 0–0.01)
NRBC BLD-RTO: 0 PER 100 WBC
PHOSPHATE SERPL-MCNC: 2.6 MG/DL (ref 2.6–4.7)
PLATELET # BLD AUTO: 250 K/UL (ref 150–400)
PMV BLD AUTO: 9.8 FL (ref 8.9–12.9)
POTASSIUM SERPL-SCNC: 3.5 MMOL/L (ref 3.5–5.1)
RBC # BLD AUTO: 4.15 M/UL (ref 4.1–5.7)
SODIUM SERPL-SCNC: 136 MMOL/L (ref 136–145)
VANCOMYCIN SERPL-MCNC: 9.2 UG/ML
WBC # BLD AUTO: 17.3 K/UL (ref 4.1–11.1)

## 2023-08-15 PROCEDURE — 6370000000 HC RX 637 (ALT 250 FOR IP): Performed by: INTERNAL MEDICINE

## 2023-08-15 PROCEDURE — 6360000002 HC RX W HCPCS: Performed by: INTERNAL MEDICINE

## 2023-08-15 PROCEDURE — 2580000003 HC RX 258: Performed by: INTERNAL MEDICINE

## 2023-08-15 PROCEDURE — 80202 ASSAY OF VANCOMYCIN: CPT

## 2023-08-15 PROCEDURE — 80048 BASIC METABOLIC PNL TOTAL CA: CPT

## 2023-08-15 PROCEDURE — 83735 ASSAY OF MAGNESIUM: CPT

## 2023-08-15 PROCEDURE — 84100 ASSAY OF PHOSPHORUS: CPT

## 2023-08-15 PROCEDURE — 85025 COMPLETE CBC W/AUTO DIFF WBC: CPT

## 2023-08-15 PROCEDURE — 36415 COLL VENOUS BLD VENIPUNCTURE: CPT

## 2023-08-15 RX ORDER — LINEZOLID 600 MG/1
600 TABLET, FILM COATED ORAL EVERY 12 HOURS SCHEDULED
Qty: 22 TABLET | Refills: 0 | Status: SHIPPED | OUTPATIENT
Start: 2023-08-16 | End: 2023-08-27

## 2023-08-15 RX ORDER — LINEZOLID 600 MG/1
600 TABLET, FILM COATED ORAL EVERY 12 HOURS SCHEDULED
Status: DISCONTINUED | OUTPATIENT
Start: 2023-08-16 | End: 2023-08-15 | Stop reason: HOSPADM

## 2023-08-15 RX ORDER — LINEZOLID 600 MG/1
600 TABLET, FILM COATED ORAL EVERY 12 HOURS SCHEDULED
Qty: 22 TABLET | Refills: 0 | Status: SHIPPED | OUTPATIENT
Start: 2023-08-16 | End: 2023-08-15 | Stop reason: SDUPTHER

## 2023-08-15 RX ADMIN — LOSARTAN POTASSIUM 50 MG: 50 TABLET, FILM COATED ORAL at 08:59

## 2023-08-15 RX ADMIN — AMLODIPINE BESYLATE 5 MG: 5 TABLET ORAL at 08:58

## 2023-08-15 RX ADMIN — ENOXAPARIN SODIUM 30 MG: 30 INJECTION SUBCUTANEOUS at 08:58

## 2023-08-15 RX ADMIN — LEVOTHYROXINE SODIUM 100 MCG: 0.1 TABLET ORAL at 06:08

## 2023-08-15 RX ADMIN — PRAVASTATIN SODIUM 20 MG: 10 TABLET ORAL at 08:58

## 2023-08-15 RX ADMIN — VANCOMYCIN HYDROCHLORIDE 1250 MG: 10 INJECTION, POWDER, LYOPHILIZED, FOR SOLUTION INTRAVENOUS at 06:09

## 2023-08-15 RX ADMIN — SODIUM CHLORIDE, PRESERVATIVE FREE 5 ML: 5 INJECTION INTRAVENOUS at 09:07

## 2023-08-15 NOTE — DISCHARGE INSTRUCTIONS
You were treated for sepsis from a urinary tract infection. Your symptoms improved. Please ask your primary care physician to get a complete blood count 1 week after discharge to ensure your white blood cell count is improving. Please followup with Urology.

## 2023-08-15 NOTE — PROGRESS NOTES
End of Shift Note    Bedside shift change report given to KIA Bay (oncoming nurse) by Chan Tolliver RN (offgoing nurse). Report included the following information SBAR, Kardex, Intake/Output, MAR, and Recent Results    Shift worked:  night     Shift summary and any significant changes:    Pt had some sleep apnea place on 2LNC overnight; no other significant events     Concerns for physician to address: none     Zone phone for oncoming shift:       Activity:     Number times ambulated in hallways past shift: 0  Number of times OOB to chair past shift: 0    Cardiac:   Cardiac Monitoring: Yes           Access:  Current line(s): PIV     Genitourinary:   Urinary status: voiding    Respiratory:      Chronic home O2 use?: NO  Incentive spirometer at bedside: NO       GI:     Current diet:  ADULT DIET;  Regular; Low Fat/Low Chol/High Fiber/JOSE M  DIET ONE TIME MESSAGE;  Passing flatus: YES  Tolerating current diet: YES       Pain Management:   Patient states pain is manageable on current regimen: YES    Skin:     Interventions: increase time out of bed    Patient Safety:  Fall Score:    Interventions: bed/chair alarm, gripper socks, and pt to call before getting OOB       Length of Stay:  Expected LOS: 3  Actual LOS: 2      Chan Tolliver RN

## 2023-08-15 NOTE — PROGRESS NOTES
Hospital follow-up PCP transitional care appointment has been scheduled with BARRETT Quiñones on 8/22/23 1300. This appt was scheduled by patient/patient rep per PCP office. [address] [place] [address] did not schedule this appt. Pending patient discharge.   Lynn Nichole, Care Management Assistant

## 2023-08-15 NOTE — PLAN OF CARE
Problem: Discharge Planning  Goal: Discharge to home or other facility with appropriate resources  Outcome: Adequate for Discharge     Problem: Pain  Goal: Verbalizes/displays adequate comfort level or baseline comfort level  8/15/2023 1338 by Amando Garcia RN  Outcome: Adequate for Discharge  8/15/2023 0121 by José Antonio Bone RN  Outcome: Progressing     Problem: Safety - Adult  Goal: Free from fall injury  8/15/2023 1338 by Amando Garcia RN  Outcome: Adequate for Discharge  8/15/2023 0121 by José Antonio Bone RN  Outcome: Progressing     Problem: ABCDS Injury Assessment  Goal: Absence of physical injury  Outcome: Adequate for Discharge     Problem: Skin/Tissue Integrity  Goal: Absence of new skin breakdown  Description: 1. Monitor for areas of redness and/or skin breakdown  2. Assess vascular access sites hourly  3. Every 4-6 hours minimum:  Change oxygen saturation probe site  4. Every 4-6 hours:  If on nasal continuous positive airway pressure, respiratory therapy assess nares and determine need for appliance change or resting period.   8/15/2023 1338 by Amando Garcia RN  Outcome: Adequate for Discharge  8/15/2023 0121 by José Antonio Bone RN  Outcome: Progressing

## 2023-08-15 NOTE — PROGRESS NOTES
Discharge paperwork reviewed, medications reviewed, Follow up doctors appointments. verbalized understanding. Vital signs stable. Patient ambulating independently.      Paperwork signed and sent home

## 2023-08-15 NOTE — DISCHARGE SUMMARY
Discharge Summary    Name: Adalberto Verdugo  [id number]  YOB: 1955 (Age: 75 y.o.)   Date of Admission: 8/12/2023  Date of Discharge: 8/15/2023  Attending Physician: No att. providers found    Discharge Diagnosis:     Severe sepsis   Left E faecalis pyelonephritis   Left nephrolithiasis   Essential HTN   Hyperlipidemia   Hypothyroidism     Consultations:  IP CONSULT TO UROLOGY      Brief Admission History/Reason for Admission Per Kierra Levine MD:     Adalberto Verdugo is a 75 y.o.  male      Known history of kidney stones [facility]     8/11/2023 Underwent a laser lithotripsy and placement of a left ureteral stent for retained stone  Discharged home from [facility] after the procedure     Overnight began to develop chills, increasing shortness of breath and cough, fevers  No unusual back pain  No dysuria  No chest pain or abdominal pain or headaches  Came to the emergency room a.m. 8/12/2023              Febrile 102.6, , RR 34,               UA cloudy, nitrite +, 20-50 WBC, 20-50 RBC, 1+ Bacteria              CTA chest with SOB                             No ASD, no Edema, no PE              CT abdomen/pelvis                           Left ureteral stent in good position                          No left hydronephrosis                          Colonic diverticulosis              Blood and urine culture sent              IVF, IV ceftriaxone              Felt better, discharged home on cefdinir  Backhome, started feeling bad, still tachypneic, recurrent fevers  Back to ED this evening  WBC 25.4, temp 102.6, , RR 30, lactate 2.53  Still tachypneic but chest x-ray shows no airspace disease or edema  We were asked to admit for work up and evaluation of the above problems.      Brief Hospital Course by Main Problems:     Severe sepsis POA WBC 25.4, temp 102.6, , RR 30, lactate 2.53  Left E faecalis pyelonephritis POA  Left medications were sent to [facility]pharmacy #[id number]- [place], [address] [facility]  [phone] Kevintessie Soumya [phone]  [address], [address] [place] [address]      Hours: 24-hours Phone: [phone]   linezolid 600 MG tablet     Pharmacy Instructions:                    DISPOSITION:    Home with Family: y      Home with HH/PT/OT/RN:    SNF/LTC:    ROSENDO:    OTHER:            Code status: full  Recommended diet: cardiac diet and diabetic diet  Recommended activity: activity as tolerated  Wound care: None      Follow up with:   PCP : MD Sharyn Osborne MD  [address]  [phone]    Follow up on 8/18/2023      Sylwia Thompson MD  [facility]  [phone]    Go on 8/22/2023  at 1:00pm for your PCP hospital follow up as previously scheduled.           Total time in minutes spent coordinating this discharge (includes going over instructions, follow-up, prescriptions, and preparing report for sign off to her PCP) :  35 minutes    Chrystal English MD

## 2023-08-15 NOTE — CARE COORDINATION
Transition of Care Plan:    RUR: %  Prior Level of Functioning: Dependent  Disposition: Home with family  If SNF or IPR: Date FOC offered: n/a  Date FOC received:   Accepting facility:   Date authorization started with reference number:   Date authorization received and expires: Follow up appointments: PCP  DME needed: none  Transportation at discharge: Wife to provide transportation  IM/IMM Medicare/ letter given:1IM Letter given 8/14/23  Is patient a  and connected with [facility]? N/a   If yes, was [employer] transfer form completed and [facility] notified? N/a  Caregiver Contact: Wife - Yonis Backer  Discharge Caregiver contacted prior to discharge? CM discussed d/c with pt and wife at bedside. Both agreed with plan. Care Conference needed? N/A  Barriers to discharge:   N/A    Pt cleared for d/c from CM standpoint    CM verified that pt pharmacy- [facility] has antibioic for d/c. Linezolid 600 mg PO. CM informed pt and wife that medication was verified with home pharmacy.        08/15/23 1504   Services At/After Discharge   Transition of Care Consult (CM Consult) [facility] Discharge None   Hospital Transport Time of Discharge 1505   Confirm Follow Up Transport Family  (Wife provided- Erica Carreno)   Condition of Participation: Discharge Planning   The Plan for Transition of Care is related to the following treatment goals: [address] [facility]  Phone: [phone]

## 2023-08-18 LAB
BACTERIA SPEC CULT: NORMAL
BACTERIA SPEC CULT: NORMAL
SERVICE CMNT-IMP: NORMAL
SERVICE CMNT-IMP: NORMAL

## 2023-08-24 NOTE — PROGRESS NOTES
Physician Progress Note      PATIENT:               Mary Santiago  CSN #:                  [id number]  :                       1955  ADMIT DATE:       2023 11:08 PM  [facility] DATE:        8/15/2023 3:37 PM  RESPONDING  PROVIDER #:        Wynetta Evangelist Mendel MD          QUERY TEXT:    68yoM pt admitted with Severe Sepsis, left pyelonephritis POA and underwent   left ureteral stent placement 23. After the procedure that Friday night   23 pt had Rigors, fevers, increasing SOB, low back pain, seen in early am   . If possible, please clarify the relationship if any between sepsis and   the surgery: The medical record reflects the following:  Risk Factors: hx of kidney stones, s/p ureteral stent placement 22, Left   pyelonephritis  Clinical Indicators: fever 102.6  RR 34 UA +  per H&P:  2023 Underwent a laser lithotripsy and placement of a left   ureteral stent for retained stone. Overnight began to develop chills, increasing shortness of breath and cough,   fevers  CT abdomen/pelvis  Left ureteral stent in good position  No left hydronephrosis  Colonic diverticulosis     Urology consult note:   CT shows stent in appropriate position without   significant hydronephrosis. He had recent ureteroscopy and stent placement and is admitted for fevers and   elevated WBC likely related to recent instrumentation. Treatment: Urology consult, supportive care and IV Rocephin, IVF bolus, blood   cx, discharged on Zyvox for 11 days. Options provided:  -- Postprocedural Sepsis  -- Sepsis related to Ureteral stent  -- Postprocedural sepsis ruled out, but is due to hx of kidney stones  -- Sepsis is unrelated to the ureteral stent, but is due to other incidental   risk factor, Please specify other incidental risk factor.   -- Other - I will add my own diagnosis  -- Disagree - Not applicable / Not valid  -- Disagree - Clinically unable to determine / Unknown  -- Refer to Clinical

## 2023-10-23 RX ORDER — LOSARTAN POTASSIUM AND HYDROCHLOROTHIAZIDE 25; 100 MG/1; MG/1
1 TABLET ORAL EVERY MORNING
COMMUNITY

## 2023-10-25 ENCOUNTER — ANESTHESIA EVENT (OUTPATIENT)
Facility: HOSPITAL | Age: 68
End: 2023-10-25
Payer: MEDICARE

## 2023-10-25 ENCOUNTER — HOSPITAL ENCOUNTER (OUTPATIENT)
Facility: HOSPITAL | Age: 68
Setting detail: OUTPATIENT SURGERY
Discharge: HOME | End: 2023-10-25
Attending: INTERNAL MEDICINE | Admitting: INTERNAL MEDICINE
Payer: MEDICARE

## 2023-10-25 ENCOUNTER — ANESTHESIA (OUTPATIENT)
Facility: HOSPITAL | Age: 68
End: 2023-10-25
Payer: MEDICARE

## 2023-10-25 VITALS
DIASTOLIC BLOOD PRESSURE: 68 MMHG | HEART RATE: 73 BPM | HEIGHT: 69 IN | BODY MASS INDEX: 40.14 KG/M2 | WEIGHT: 271 LBS | OXYGEN SATURATION: 95 % | TEMPERATURE: 98.1 F | SYSTOLIC BLOOD PRESSURE: 131 MMHG | RESPIRATION RATE: 17 BRPM

## 2023-10-25 PROCEDURE — 2580000003 HC RX 258: Performed by: INTERNAL MEDICINE

## 2023-10-25 PROCEDURE — 7100000010 HC PHASE II RECOVERY - FIRST 15 MIN: Performed by: INTERNAL MEDICINE

## 2023-10-25 PROCEDURE — 3600007511: Performed by: INTERNAL MEDICINE

## 2023-10-25 PROCEDURE — 6360000002 HC RX W HCPCS: Performed by: NURSE ANESTHETIST, CERTIFIED REGISTERED

## 2023-10-25 PROCEDURE — 3700000000 HC ANESTHESIA ATTENDED CARE: Performed by: INTERNAL MEDICINE

## 2023-10-25 PROCEDURE — 88305 TISSUE EXAM BY PATHOLOGIST: CPT

## 2023-10-25 PROCEDURE — 2500000003 HC RX 250 WO HCPCS: Performed by: NURSE ANESTHETIST, CERTIFIED REGISTERED

## 2023-10-25 PROCEDURE — 3700000001 HC ADD 15 MINUTES (ANESTHESIA): Performed by: INTERNAL MEDICINE

## 2023-10-25 PROCEDURE — 3600007501: Performed by: INTERNAL MEDICINE

## 2023-10-25 PROCEDURE — 2709999900 HC NON-CHARGEABLE SUPPLY: Performed by: INTERNAL MEDICINE

## 2023-10-25 RX ORDER — SODIUM CHLORIDE 0.9 % (FLUSH) 0.9 %
5-40 SYRINGE (ML) INJECTION PRN
Status: DISCONTINUED | OUTPATIENT
Start: 2023-10-25 | End: 2023-10-25 | Stop reason: HOSPADM

## 2023-10-25 RX ORDER — SODIUM CHLORIDE 0.9 % (FLUSH) 0.9 %
5-40 SYRINGE (ML) INJECTION EVERY 12 HOURS SCHEDULED
Status: DISCONTINUED | OUTPATIENT
Start: 2023-10-25 | End: 2023-10-25 | Stop reason: HOSPADM

## 2023-10-25 RX ORDER — SODIUM CHLORIDE 9 MG/ML
25 INJECTION, SOLUTION INTRAVENOUS PRN
Status: DISCONTINUED | OUTPATIENT
Start: 2023-10-25 | End: 2023-10-25 | Stop reason: HOSPADM

## 2023-10-25 RX ADMIN — LIDOCAINE HYDROCHLORIDE 40 MG: 20 INJECTION, SOLUTION INFILTRATION; PERINEURAL at 09:43

## 2023-10-25 RX ADMIN — SODIUM CHLORIDE 25 ML: 9 INJECTION, SOLUTION INTRAVENOUS at 09:01

## 2023-10-25 RX ADMIN — SODIUM CHLORIDE: 9 INJECTION, SOLUTION INTRAVENOUS at 10:07

## 2023-10-25 RX ADMIN — PROPOFOL 350 MG: 10 INJECTION, EMULSION INTRAVENOUS at 10:06

## 2023-10-25 ASSESSMENT — PAIN - FUNCTIONAL ASSESSMENT: PAIN_FUNCTIONAL_ASSESSMENT: 0-10

## 2023-10-25 NOTE — ANESTHESIA POSTPROCEDURE EVALUATION
Department of Anesthesiology  Postprocedure Note    Patient: Álvaro White  MRN: [id number]  YOB: 1955  Date of evaluation: 10/25/2023      Procedure Summary     Date: 10/25/23 Room / Location: [facility] ENDO 02 / [facility] ENDOSCOPY    Anesthesia Start: 0943 Anesthesia Stop: 1009    Procedure: COLONOSCOPY Diagnosis:       Personal history of colonic polyps      (Personal history of colonic polyps [Z86.010])    Surgeons: Luis Wu MD Responsible Provider: Selina Alvarenga MD    Anesthesia Type: MAC ASA Status: 3          Anesthesia Type: MAC    Shereen Phase I: Shereen Score: 10    Shereen Phase II:        Anesthesia Post Evaluation    Patient location during evaluation: PACU  Patient participation: complete - patient participated  Level of consciousness: awake  Airway patency: patent  Nausea & Vomiting: no vomiting  Complications: no  Cardiovascular status: hemodynamically stable  Respiratory status: acceptable  Hydration status: euvolemic

## 2023-10-25 NOTE — OP NOTE
Colonoscopy Procedure Note    Drew Lewis  1955  [id number]    Indications:  Please see below. Pre-operative Diagnosis: Personal history of colonic polyps [Z86.010]    Post-operative Diagnosis:   polyps colon,   prominent ICV,   diverticulosis,   internal hemorrhoids    : Vadim Neil MD    Referring Provider: Sweta Gresham MD    Sedation:  MAC anesthesia Propofol        Procedure Details:    After detailed informed consent was obtained with all risks and benefits of procedure explained and preoperative exam completed, the patient was taken to the endoscopy suite and placed in the left lateral decubitus position. Upon sequential sedation as per above, a digital rectal exam was performed  and was normal.  The Olympus videocolonoscope  was inserted in the rectum and carefully advanced to the cecum, which was identified by the ileocecal valve and appendiceal orifice. The colonoscope was slowly withdrawn with careful evaluation between folds. Retroflexion in the rectum was performed. The quality of preparation was fair    Cannon Bowel Preparation Score: 2/2/2    0 = Unprepared colon segment with mucosa not seen due to solid stool that cannot be cleared. 1 = Portion of mucosa of the colon segment seen, but other areas of the colon segment not well seen due to staining, residual stool and/or opaque liquid. 2 = Minor amount of residual staining, small fragments of stool and/or opaque liquid, but mucosa of colon segment seen well. 3 = Entire mucosa of colon segment seen well with no residual staining, small fragments of stool or opaque liquid. .    Findings:   The Olympus video high-definition colonoscope was advanced to the cecum, identified by its typical land marks, with ease. Prominent minimally no ICV noted. Biopsies were taken. A sessile 8 mm proximal descending colon polyp was removed with a cold snare. A small 5 mm sigmoid sessile polyp removed with a cold snare.   Moderate

## 2023-10-25 NOTE — PERIOP NOTE
See anesthesia note and MAR for medications given during procedure. Received report from anesthesia staff on vital signs and status of patient.      Endoscope was pre-cleaned at the bedside immediately following procedure by KEVIN Stevenson

## 2023-10-25 NOTE — DISCHARGE INSTRUCTIONS
[place] Office: [phone]    Jimmy Cuellar  [id number]  1955    EGD/COLONOSCOPY DISCHARGE INSTRUCTIONS  Discomfort:  Sore throat- throat lozenges or warm salt water gargle  redness at IV site- apply warm compress to area; if redness or soreness persist- contact your physician  Gaseous discomfort- walking, belching will help relieve any discomfort  You may not operate a vehicle for 12 hours  You may not engage in an occupation involving machinery or appliances for rest of today. You may not drink alcoholic beverages for at least 12 hours  Avoid making any critical decisions for at least 24 hour  DIET  You may resume your regular diet - however -  remember your colon is empty and a heavy meal will produce gas. Avoid these foods:  fried / greasy foods, excessive carbonated drinks or too much caffeine  MEDICATIONS   Regarding Aspirin or Nonsteroidal medications specifically, please see below. ACTIVITY  You may resume your normal daily activities. Spend the remainder of the day resting -  avoid any strenuous activity. CALL M.D. ANY SIGN OF   Increasing pain, nausea, vomiting  Abdominal distension (swelling)  New increased bleeding (oral or rectal)  Fever (chills)  Pain in chest area  Bloody discharge from nose or mouth  Shortness of breath    You may not take any Advil, Aspirin, Ibuprofen, Motrin or Aleve(NSAIDs) for 7 days, ONLY  Tylenol as needed for pain. Findings:  The Olympus video high-definition colonoscope was advanced to the cecum, identified by its typical land marks, with ease. Prominent minimally no ICV noted. Biopsies were taken. A sessile 8 mm proximal descending colon polyp was removed with a cold snare. A small 5 mm sigmoid sessile polyp removed with a cold snare. Moderate sigmoid diverticulosis. Medium sized internal hemorrhoids. Follow-up Instructions:   Call  Vadim Dacosta MD for any questions or concerns  Results of procedure / biopsy in 7 days   Telephone #

## 2023-10-25 NOTE — PROGRESS NOTES
TRANSFER - IN REPORT:    Verbal report received from [facility] on Chinostephanie Almaguerkman  being received from [facility]   for routine progression of patient care      Report consisted of patient's Situation, Background, Assessment and   Recommendations(SBAR). Information from the following report(s) Nurse Handoff Report was reviewed with the receiving nurse. Opportunity for questions and clarification was provided. Assessment completed upon patient's arrival to unit and care assumed.

## 2023-10-25 NOTE — ANESTHESIA PRE PROCEDURE
Department of Anesthesiology  Preprocedure Note       Name:  Lavell Rojo   Age:  70 y.o.  :  1955                                          MRN:  [id number]         Date:  10/25/2023      Surgeon: Sherine Cordero):  Elliot Adams MD    Procedure: Procedure(s):  COLONOSCOPY    Medications prior to admission:   Prior to Admission medications    Medication Sig Start Date End Date Taking? Authorizing Provider   losartan-hydroCHLOROthiazide (HYZAAR) 100-25 MG per tablet Take 1 tablet by mouth every morning   Yes Provider, MD Gerardo   ondansetron (ZOFRAN-ODT) 4 MG disintegrating tablet Take 1 tablet by mouth 3 times daily as needed for Nausea or Vomiting  Patient not taking: Reported on 10/23/2023 8/12/23   Maddie Chavarria MD   clindamycin (CLEOCIN) 300 MG capsule Take 1 capsule by mouth 3 times daily 23   Jett Joy MD   clindamycin (CLEOCIN) 300 MG capsule Take 2 pills 1 hour before dental procedure and 2 pills 1 hour after dental procedure 23   Jett Joy MD   amLODIPine (NORVASC) 10 MG tablet Take 0.5 tablets by mouth daily    Automatic Reconciliation, Ar   aspirin 81 MG EC tablet Take 1 tablet by mouth daily    Automatic Reconciliation, Ar   vitamin D 25 MCG (1000 UT) CAPS Take 1 capsule by mouth daily    Automatic Reconciliation, Ar   clindamycin (CLEOCIN) 300 MG capsule Take 2 pills 1 hour prior to dental procedures and take 2 pills 1 hour after dental procedure 1/10/23   Automatic Reconciliation, Ar   levothyroxine (SYNTHROID) 100 MCG tablet Take 1 tablet by mouth every morning (before breakfast)    Automatic Reconciliation, Ar   pravastatin (PRAVACHOL) 20 MG tablet Take 1 tablet by mouth daily    Automatic Reconciliation, Ar   triamcinolone (ARISTOCORT) 0.5 % ointment Apply topically daily as needed    Automatic Reconciliation, Ar       Current medications:    No current facility-administered medications for this encounter. Allergies:     Allergies   Allergen Reactions

## 2023-10-25 NOTE — H&P
Pre-endoscopy H and P    The patient was seen and examined in the room/pre-op holding area. The airway was assessed and documented. The problem list, past medical history, and medications were reviewed.      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    Osteoarthritis of right shoulder    Osteoarthritis of left knee    Acute pain of left shoulder    Primary osteoarthritis of left shoulder    Osteoarthritis    Osteoarthritis of left shoulder    Severe sepsis (HCC)     Social History     Socioeconomic History    Marital status:      Spouse name: Not on file    Number of children: Not on file    Years of education: Not on file    Highest education level: Not on file   Occupational History    Not on file   Tobacco Use    Smoking status: Never    Smokeless tobacco: Never   Vaping Use    Vaping Use: Never used   Substance and Sexual Activity    Alcohol use: Yes     Comment: occasional beer-more in summer weather    Drug use: No    Sexual activity: Not on file   Other Topics Concern    Not on file   Social History Narrative    Not on file     Social Determinants of Health     Financial Resource Strain: Not on file   Food Insecurity: Not on file   Transportation Needs: Not on file   Physical Activity: Not on file   Stress: Not on file   Social Connections: Not on file   Intimate Partner Violence: Not on file   Housing Stability: Not on file     Past Medical History:   Diagnosis Date    Arthritis     At risk for sleep apnea 10/19/2020    sleep study done--uses bumper pad at night to stay sleeping on side    Cancer ([address])     MELANOMA RIGHT EAR    Chronic pain     KNEES, SHOULDERS    GERD (gastroesophageal reflux disease)     High cholesterol     Hypertension     Hypothyroid     Kidney stone     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

## 2024-03-03 NOTE — PERIOP NOTES
No acute changes to note thus far tonight. Patient remains alert and oriented, free of pain and distress. Currently resting sound with bed locked and in lowest position, alarms on for fall prevention.   Admitted with c/o SOB, cough and post fall WO injury. Treating for COPD exac. IV abx: zithromax through 3/4 and rocephin through 3/6 for possible/early PNA. Scheduled prednisone and duoneb txs.   Unable to obtain sputum sample yet tonight, free of cough and dry.  VSS; afebrile, room air, no tele.  CCM diet, meds whole with water, monitoring glucose ACHS: 370 at HS with nightly insulin administered. Good overall appetite/intake. C/o constipation with PRN administered. Can be incontinent at times, otherwise BRP with 1 assist, GB and walker with non-skid socks. PT/OT following. Patient can be impulsive at times. Call light kept within reach with needs also anticipated.  Patient remains safe, free from falls and injury. Plan to discharge once deemed stable, with hopes in returning home with family.    Problem: At Risk for Falls  Goal: Patient does not fall  Outcome: Monitoring/Evaluating progress     Problem: At Risk for Injury Due to Fall  Goal: Patient does not fall  Outcome: Monitoring/Evaluating progress  Goal: Takes action to control condition specific risks  Outcome: Monitoring/Evaluating progress  Goal: Verbalizes understanding of fall-related injury personal risks  Description: Document education using the patient education activity  Outcome: Monitoring/Evaluating progress     Problem: Pain  Goal: Acceptable pain level achieved/maintained at rest using appropriate pain scale for the patient  Outcome: Monitoring/Evaluating progress  Goal: Acceptable pain level achieved/maintained with activity using appropriate pain scale for the patient  Outcome: Monitoring/Evaluating progress     Problem: Impaired Physical Mobility  Goal: Functional status is maintained or returned to baseline during hospitalization  Outcome:  With patient's permission, wife called and update provided. Monitoring/Evaluating progress

## 2024-07-20 NOTE — PROGRESS NOTES
Neurology Note    Patient ID:  Johnathon Madden  [id number]  68 y.o.  1955      Date of Consultation:  July 22, 2024    Referring Physician: Dr. Richards    Reason for Consultation:  numbness and tingling        Assessment and Plan:      The patient is a pleasant 68 year old who presents with sensory disturbance, numbness and pain which is predominantly in his lower extremity and to a lesser extent in his upper extremities.  Examination does reveal length dependent loss to pp and vibration.  Absent reflexes in his lower extremities.       Sensory disturbance in bilateral lower extremities:  Examination consistent with a length dependent peripheral neuropathy  Contributing disease can include elevated cholesterol and thyroid disease  Possible some element of lumbar degenerative spine disease contributing  Will order emg/ncs   Will order vitamin b12 and immunofixation  Will have patient sign a release of information to obtain additional testing that has been completed by his pcp  Depending on results above, may need to consider additional testing or imaging  Neuropathy:  we reviewed the causes contributing to the neuropathy.  We discussed the importance of exercise and activity.  I also reviewed the importance of safety with ambulation and ways to prevents falls.     Neuropathic pain:   Discussed medications including gabapentin, lyrica, cymbalta, elavil.  He will continue to use acetaminophen/ibuprofen for the time being  He is not interested in prescription medication at this time  Encouraged increased activity      Essential tremor:   Present for many years  Has been relatively stable  Will follow clinically for the time being.         Subjective: numbness and tingling       History of Present Illness:   Johnathon Madden is a 68 y.o. male who was referred to the neurology clinic at [facility] for evaluation.  The patient was referred due to sensory disturbance, pain and tingling in his bilateral

## 2024-07-22 ENCOUNTER — OFFICE VISIT (OUTPATIENT)
Age: 69
End: 2024-07-22
Payer: MEDICARE

## 2024-07-22 ENCOUNTER — CLINICAL DOCUMENTATION (OUTPATIENT)
Age: 69
End: 2024-07-22

## 2024-07-22 VITALS
DIASTOLIC BLOOD PRESSURE: 84 MMHG | OXYGEN SATURATION: 98 % | RESPIRATION RATE: 16 BRPM | WEIGHT: 268 LBS | BODY MASS INDEX: 39.69 KG/M2 | HEART RATE: 78 BPM | SYSTOLIC BLOOD PRESSURE: 130 MMHG | HEIGHT: 69 IN

## 2024-07-22 DIAGNOSIS — G60.9 IDIOPATHIC PERIPHERAL NEUROPATHY: Primary | ICD-10-CM

## 2024-07-22 DIAGNOSIS — M79.2 NEUROPATHIC PAIN: ICD-10-CM

## 2024-07-22 DIAGNOSIS — G62.9 NEUROPATHY: ICD-10-CM

## 2024-07-22 DIAGNOSIS — M54.50 LUMBAR PAIN: ICD-10-CM

## 2024-07-22 DIAGNOSIS — G60.9 IDIOPATHIC PERIPHERAL NEUROPATHY: ICD-10-CM

## 2024-07-22 PROCEDURE — 1123F ACP DISCUSS/DSCN MKR DOCD: CPT | Performed by: PSYCHIATRY & NEUROLOGY

## 2024-07-22 PROCEDURE — G8417 CALC BMI ABV UP PARAM F/U: HCPCS | Performed by: PSYCHIATRY & NEUROLOGY

## 2024-07-22 PROCEDURE — 1036F TOBACCO NON-USER: CPT | Performed by: PSYCHIATRY & NEUROLOGY

## 2024-07-22 PROCEDURE — 3017F COLORECTAL CA SCREEN DOC REV: CPT | Performed by: PSYCHIATRY & NEUROLOGY

## 2024-07-22 PROCEDURE — 99205 OFFICE O/P NEW HI 60 MIN: CPT | Performed by: PSYCHIATRY & NEUROLOGY

## 2024-07-22 PROCEDURE — G8427 DOCREV CUR MEDS BY ELIG CLIN: HCPCS | Performed by: PSYCHIATRY & NEUROLOGY

## 2024-07-22 RX ORDER — FLUTICASONE PROPIONATE 50 MCG
1 SPRAY, SUSPENSION (ML) NASAL AS NEEDED
COMMUNITY

## 2024-07-22 NOTE — PATIENT INSTRUCTIONS
Try to increase your exercise to 3-4 times a week.  The exercise bike and nautilus equipment would be great.

## 2024-07-22 NOTE — PROGRESS NOTES
Faxed Auth to Disclose Info to  office at [phone] asking for office note, labs faxed to us. Received fax confirmation.   Placed in fast scan.

## 2024-07-22 NOTE — PROGRESS NOTES
1. Have you been to the ER, urgent care clinic since your last visit?  Hospitalized since your last visit?  No.    2. Have you seen or consulted any other health care providers outside of the [facility] since your last visit?  Include any pap smears or colon screening.   No.        Chief Complaint   Patient presents with    New Patient     Neuropathy in feet- burning and numbness- no falls

## 2024-07-23 ENCOUNTER — PROCEDURE VISIT (OUTPATIENT)
Age: 69
End: 2024-07-23
Payer: MEDICARE

## 2024-07-23 DIAGNOSIS — R20.0 NUMBNESS AND TINGLING OF BOTH LEGS BELOW KNEES: ICD-10-CM

## 2024-07-23 DIAGNOSIS — M54.16 LUMBAR RADICULOPATHY: Primary | ICD-10-CM

## 2024-07-23 DIAGNOSIS — R20.2 NUMBNESS AND TINGLING OF BOTH LEGS BELOW KNEES: ICD-10-CM

## 2024-07-23 LAB — VIT B12 SERPL-MCNC: 390 PG/ML (ref 193–986)

## 2024-07-23 PROCEDURE — 95910 NRV CNDJ TEST 7-8 STUDIES: CPT | Performed by: PSYCHIATRY & NEUROLOGY

## 2024-07-23 PROCEDURE — 95886 MUSC TEST DONE W/N TEST COMP: CPT | Performed by: PSYCHIATRY & NEUROLOGY

## 2024-07-23 NOTE — PROGRESS NOTES
ELECTRODIANOSTIC REPORT  Test Date:  2024    Patient: Johnathon Madden : 1955 Physician: Dr. Steven Botello.   ID#: [id number] SEX: Male Ref. Phys: Dr. Steven Botello     Patient History / Exam:    The patient is a pleasant 68-year-old male with sensory disturbance, pain in his distal lower extremity.  He has decreased sensation to pinprick and vibration in the length-dependent process.  Reflexes are reduced in his lower extremity.  He also does have significant back pain      EMG & NCV Findings:    Nerve conduction studies as listed below for the bilateral superficial fibular sensory and sural sensory were normal.  There is no significant side to side difference.  The amplitudes were low normal.  The bilateral fibular motor nerve conduction study and tibial motor nerve conduction studies were normal.  There is no significant side-to-side difference    Disposable concentric needle examination of the muscles listed below in the bilateral lower extremity revealed increased insertional activity with fibrillation potentials seen in the left tibialis anterior.  There is complex repetitive discharges seen in the bilateral medial gastroc.  There was chronic neurogenic appearing motor units in multiple L4-S1 musculature.      Impression:    This study was abnormal.  There is electrodiagnostic evidence upon today's examination suggestin.  A bilateral chronic lower lumbar radiculopathy with no evidence of active denervation.  Please correlate these findings with lumbar spine neuroimaging    2.  There is no evidence of a focal or generalized large fiber neuropathy or myopathy.    ___________________________  Steven Botello D.O. FAAN      Nerve Conduction Studies  Anti Sensory Summary Table     Stim Site NR Onset (ms) Peak (ms) O-P Amp (µV) Norm Peak (ms) Norm O-P Amp Site1 Site2 Dist (cm) Norm Porter (m/s)   Left Sup Fibular Anti Sensory (Lat ankle)  32.7 °C   Lower leg    1.9 2.5 5.2 <4.4 >5.0 Lower leg Lat ankle 10.0

## 2024-07-26 ENCOUNTER — CLINICAL DOCUMENTATION (OUTPATIENT)
Age: 69
End: 2024-07-26

## 2024-07-26 LAB
IGA SERPL-MCNC: 168 MG/DL (ref 61–437)
IGG SERPL-MCNC: 709 MG/DL (ref 603–1613)
IGM SERPL-MCNC: 41 MG/DL (ref 20–172)
PROT PATTERN SERPL IFE-IMP: NORMAL

## 2024-08-02 ENCOUNTER — CLINICAL DOCUMENTATION (OUTPATIENT)
Age: 69
End: 2024-08-02

## 2024-08-12 ENCOUNTER — HOSPITAL ENCOUNTER (OUTPATIENT)
Facility: HOSPITAL | Age: 69
Discharge: HOME | End: 2024-08-15
Attending: PSYCHIATRY & NEUROLOGY
Payer: MEDICARE

## 2024-08-12 DIAGNOSIS — R20.2 NUMBNESS AND TINGLING OF BOTH LEGS BELOW KNEES: ICD-10-CM

## 2024-08-12 DIAGNOSIS — M54.16 LUMBAR RADICULOPATHY: ICD-10-CM

## 2024-08-12 DIAGNOSIS — R20.0 NUMBNESS AND TINGLING OF BOTH LEGS BELOW KNEES: ICD-10-CM

## 2024-08-12 PROCEDURE — 72148 MRI LUMBAR SPINE W/O DYE: CPT

## 2024-08-20 ENCOUNTER — TELEPHONE (OUTPATIENT)
Age: 69
End: 2024-08-20

## 2024-08-20 DIAGNOSIS — M54.16 LUMBAR RADICULOPATHY: Primary | ICD-10-CM

## 2024-08-20 NOTE — TELEPHONE ENCOUNTER
----- Message from Dr. Steven Botello DO sent at 8/19/2024 11:02 AM EDT -----  Hi,    Can you please let the patient know:    The MRI of your lumbar spine revealed significant degeneration and arthritis.  I would like to set up an evaluation for you to see a spine specialist to see if there are any potential treatment options that would help with symptom relief    He is not on mychart.    Thanks.   Dr. Botello  ----- Message -----  From: [facility] Incoming Orders Results To Radiant  Sent: 8/19/2024   9:08 AM EDT  To: Steven Botello DO          Called pt,verified pt with two pt identifiers, relayed message above to pt word for word. He is in agreement to seeing specialist. Advised I will call him back once  responds to me. Answered any questions pt had. Pt verbalized understanding and thanked me for call.

## 2024-08-21 NOTE — TELEPHONE ENCOUNTER
Message  Received: Yesterday  Steven Botello, Patricia Vale, LPN  Caller: Unspecified (Yesterday, 11:10 AM)  Referral sent          Faxed referral, last office note and MRI result to  office at [phone] and received fax confirmation.        Placed referral in mail to pt.        Called pt,verified pt with two pt identifiers, advised pt the referral has been placed for [facility]. He advised they called him yesterday and he is scheduled for late October! Advised I have faxed over office note, mri results and referral to them. I have also placed in the mail to him.  Advised since pt is scheduled nothing further needed from us. Pt thanked me for the call and verbalized understanding.

## 2024-12-18 PROBLEM — M17.11 ARTHRITIS OF RIGHT KNEE: Status: ACTIVE | Noted: 2024-12-18

## 2025-02-24 ENCOUNTER — OFFICE VISIT (OUTPATIENT)
Age: 70
End: 2025-02-24
Payer: MEDICARE

## 2025-02-24 ENCOUNTER — HOSPITAL ENCOUNTER (OUTPATIENT)
Facility: HOSPITAL | Age: 70
Discharge: HOME | End: 2025-02-27
Payer: MEDICARE

## 2025-02-24 VITALS
HEART RATE: 81 BPM | BODY MASS INDEX: 39.69 KG/M2 | WEIGHT: 268 LBS | SYSTOLIC BLOOD PRESSURE: 107 MMHG | DIASTOLIC BLOOD PRESSURE: 72 MMHG | TEMPERATURE: 98.3 F | HEIGHT: 69 IN

## 2025-02-24 VITALS
HEIGHT: 69 IN | SYSTOLIC BLOOD PRESSURE: 122 MMHG | BODY MASS INDEX: 40.23 KG/M2 | RESPIRATION RATE: 18 BRPM | WEIGHT: 271.6 LBS | DIASTOLIC BLOOD PRESSURE: 62 MMHG | OXYGEN SATURATION: 95 % | HEART RATE: 70 BPM

## 2025-02-24 DIAGNOSIS — M54.16 LUMBAR RADICULOPATHY: Primary | ICD-10-CM

## 2025-02-24 DIAGNOSIS — G25.0 BENIGN ESSENTIAL TREMOR: ICD-10-CM

## 2025-02-24 LAB
ABO + RH BLD: NORMAL
APPEARANCE UR: CLEAR
BACTERIA URNS QL MICRO: NEGATIVE /HPF
BILIRUB UR QL: NEGATIVE
BLOOD GROUP ANTIBODIES SERPL: NORMAL
COLOR UR: NORMAL
EPITH CASTS URNS QL MICRO: NORMAL /LPF
GLUCOSE UR STRIP.AUTO-MCNC: NEGATIVE MG/DL
HGB UR QL STRIP: NEGATIVE
HYALINE CASTS URNS QL MICRO: NORMAL /LPF (ref 0–5)
INR PPP: 1 (ref 0.9–1.1)
KETONES UR QL STRIP.AUTO: NEGATIVE MG/DL
LEUKOCYTE ESTERASE UR QL STRIP.AUTO: NEGATIVE
NITRITE UR QL STRIP.AUTO: NEGATIVE
PH UR STRIP: 7.5 (ref 5–8)
PROT UR STRIP-MCNC: NEGATIVE MG/DL
PROTHROMBIN TIME: 10.6 SEC (ref 9.2–11.2)
RBC #/AREA URNS HPF: NORMAL /HPF (ref 0–5)
SP GR UR REFRACTOMETRY: 1.01 (ref 1–1.03)
SPECIMEN EXP DATE BLD: NORMAL
URINE CULTURE IF INDICATED: NORMAL
UROBILINOGEN UR QL STRIP.AUTO: 0.2 EU/DL (ref 0.2–1)
WBC URNS QL MICRO: NORMAL /HPF (ref 0–4)

## 2025-02-24 PROCEDURE — 1126F AMNT PAIN NOTED NONE PRSNT: CPT | Performed by: NURSE PRACTITIONER

## 2025-02-24 PROCEDURE — 86900 BLOOD TYPING SEROLOGIC ABO: CPT

## 2025-02-24 PROCEDURE — 99214 OFFICE O/P EST MOD 30 MIN: CPT | Performed by: NURSE PRACTITIONER

## 2025-02-24 PROCEDURE — 1036F TOBACCO NON-USER: CPT | Performed by: NURSE PRACTITIONER

## 2025-02-24 PROCEDURE — 86850 RBC ANTIBODY SCREEN: CPT

## 2025-02-24 PROCEDURE — 81001 URINALYSIS AUTO W/SCOPE: CPT

## 2025-02-24 PROCEDURE — 3017F COLORECTAL CA SCREEN DOC REV: CPT | Performed by: NURSE PRACTITIONER

## 2025-02-24 PROCEDURE — 85610 PROTHROMBIN TIME: CPT

## 2025-02-24 PROCEDURE — 1159F MED LIST DOCD IN RCRD: CPT | Performed by: NURSE PRACTITIONER

## 2025-02-24 PROCEDURE — 1160F RVW MEDS BY RX/DR IN RCRD: CPT | Performed by: NURSE PRACTITIONER

## 2025-02-24 PROCEDURE — G8427 DOCREV CUR MEDS BY ELIG CLIN: HCPCS | Performed by: NURSE PRACTITIONER

## 2025-02-24 PROCEDURE — 36415 COLL VENOUS BLD VENIPUNCTURE: CPT

## 2025-02-24 PROCEDURE — G8417 CALC BMI ABV UP PARAM F/U: HCPCS | Performed by: NURSE PRACTITIONER

## 2025-02-24 PROCEDURE — 1123F ACP DISCUSS/DSCN MKR DOCD: CPT | Performed by: NURSE PRACTITIONER

## 2025-02-24 PROCEDURE — 86901 BLOOD TYPING SEROLOGIC RH(D): CPT

## 2025-02-24 ASSESSMENT — PAIN DESCRIPTION - ORIENTATION: ORIENTATION: RIGHT

## 2025-02-24 ASSESSMENT — PAIN DESCRIPTION - LOCATION: LOCATION: KNEE

## 2025-02-24 ASSESSMENT — KOOS JR
GOING UP OR DOWN STAIRS: SEVERE
BENDING TO THE FLOOR TO PICK UP OBJECT: MODERATE
TWISING OR PIVOTING ON KNEE: SEVERE
STANDING UPRIGHT: SEVERE
STRAIGHTENING KNEE FULLY: MODERATE
KOOS JR TOTAL INTERVAL SCORE: 42.281
RISING FROM SITTING: MODERATE
HOW SEVERE IS YOUR KNEE STIFFNESS AFTER FIRST WAKING IN MORNING: SEVERE

## 2025-02-24 ASSESSMENT — PROMIS GLOBAL HEALTH SCALE
IN GENERAL, WOULD YOU SAY YOUR HEALTH IS...[ON A SCALE OF 1 (POOR) TO 5 (EXCELLENT)]: GOOD
SUM OF RESPONSES TO QUESTIONS 3, 6, 7, & 8: 18
IN GENERAL, HOW WOULD YOU RATE YOUR MENTAL HEALTH, INCLUDING YOUR MOOD AND YOUR ABILITY TO THINK [ON A SCALE OF 1 (POOR) TO 5 (EXCELLENT)]?: VERY GOOD
WHO IS THE PERSON COMPLETING THE PROMIS V1.1 SURVEY?: SELF
TO WHAT EXTENT ARE YOU ABLE TO CARRY OUT YOUR EVERYDAY PHYSICAL ACTIVITIES SUCH AS WALKING, CLIMBING STAIRS, CARRYING GROCERIES, OR MOVING A CHAIR [ON A SCALE OF 1 (NOT AT ALL) TO 5 (COMPLETELY)]?: MOSTLY
IN GENERAL, PLEASE RATE HOW WELL YOU CARRY OUT YOUR USUAL SOCIAL ACTIVITIES (INCLUDES ACTIVITIES AT HOME, AT WORK, AND IN YOUR COMMUNITY, AND RESPONSIBILITIES AS A PARENT, CHILD, SPOUSE, EMPLOYEE, FRIEND, ETC) [ON A SCALE OF 1 (POOR) TO 5 (EXCELLENT)]?: VERY GOOD
IN THE PAST 7 DAYS, HOW OFTEN HAVE YOU BEEN BOTHERED BY EMOTIONAL PROBLEMS, SUCH AS FEELING ANXIOUS, DEPRESSED, OR IRRITABLE [ON A SCALE FROM 1 (NEVER) TO 5 (ALWAYS)]?: NEVER
IN GENERAL, HOW WOULD YOU RATE YOUR SATISFACTION WITH YOUR SOCIAL ACTIVITIES AND RELATIONSHIPS [ON A SCALE OF 1 (POOR) TO 5 (EXCELLENT)]?: VERY GOOD
SUM OF RESPONSES TO QUESTIONS 2, 4, 5, & 10: 16
IN GENERAL, WOULD YOU SAY YOUR QUALITY OF LIFE IS...[ON A SCALE OF 1 (POOR) TO 5 (EXCELLENT)]: GOOD
HOW IS THE PROMIS V1.1 BEING ADMINISTERED?: PAPER
IN THE PAST 7 DAYS, HOW WOULD YOU RATE YOUR PAIN ON AVERAGE [ON A SCALE FROM 0 (NO PAIN) TO 10 (WORST IMAGINABLE PAIN)]?: 6
IN GENERAL, HOW WOULD YOU RATE YOUR PHYSICAL HEALTH [ON A SCALE OF 1 (POOR) TO 5 (EXCELLENT)]?: GOOD

## 2025-02-24 ASSESSMENT — PATIENT HEALTH QUESTIONNAIRE - PHQ9
SUM OF ALL RESPONSES TO PHQ QUESTIONS 1-9: 0
1. LITTLE INTEREST OR PLEASURE IN DOING THINGS: NOT AT ALL
SUM OF ALL RESPONSES TO PHQ9 QUESTIONS 1 & 2: 0
SUM OF ALL RESPONSES TO PHQ QUESTIONS 1-9: 0
2. FEELING DOWN, DEPRESSED OR HOPELESS: NOT AT ALL

## 2025-02-24 ASSESSMENT — PAIN DESCRIPTION - PAIN TYPE: TYPE: CHRONIC PAIN

## 2025-02-24 ASSESSMENT — PAIN DESCRIPTION - DESCRIPTORS: DESCRIPTORS: ACHING

## 2025-02-24 ASSESSMENT — PAIN - FUNCTIONAL ASSESSMENT: PAIN_FUNCTIONAL_ASSESSMENT: PREVENTS OR INTERFERES SOME ACTIVE ACTIVITIES AND ADLS

## 2025-02-24 ASSESSMENT — PAIN SCALES - GENERAL: PAINLEVEL_OUTOF10: 5

## 2025-02-24 ASSESSMENT — ENCOUNTER SYMPTOMS: BACK PAIN: 1

## 2025-02-24 NOTE — PROGRESS NOTES
Chief Complaint   Patient presents with    Neurologic Problem     Follow up for neuropathy in feet - states he was told it was related to the arthritis in his back - sees Dr Sharpe at [facility]      1. Have you been to the ER, urgent care clinic since your last visit?  Hospitalized since your last visit? No     2. Have you seen or consulted any other health care providers outside of the [facility] since your last visit?  Include any pap smears or colon screening.  No     
support improvement of the AI technology. The patient (or guardian, if applicable) and other individuals in attendance at the appointment consented to the use of AI, including the recording.                   ARTI Eden-BC

## 2025-02-24 NOTE — PERIOP NOTE
CALLED PCP FOR LABS/EKG-RECEIVED, HOWEVER, EKG NOT SIGNED. CALLED AND LVM TO HAVE EKG SIGNED AND RE-FAXED TO PAT.

## 2025-02-24 NOTE — PERIOP NOTE
[address] [address]   MAIN OR                     [phone]    MAIN PRE OP             [phone]                                                                                AMBULATORY PRE OP          [phone]  PRE-ADMISSION TESTING    [phone]     Surgery Date:  3/5/25       Is surgery arrival time given by surgeon?  YES  NO    If “NO”, [facility] staff will call you between 4 and 7pm the day before your surgery with your arrival time. (If your surgery is on a Monday, we will call you the Friday before.)    Call [phone] after 7pm Monday-Friday if you did not receive this call.    INSTRUCTIONS BEFORE YOUR SURGERY   When You  Arrive Arrive at [facility] Patient Access on 1st floor the day of your surgery.  Have your insurance card, photo ID,living will/advanced directive/POA (if applicable),  and any copayment (if needed)   Food   and   Drink NO solid food after midnight the night before surgery. You can drink clear liquids from midnight until ONE hour prior to your arrival at the hospital on the day of your surgery. Clear liquids include:  Water  Apple juice (no sediment)  Carbonated beverages  Black coffee(no cream/milk)  Tea(no cream/milk)  Gatorade    No alcohol (beer, wine, liquor) or marijuana (smoking) 24 hours, edibles (3 days). Stop smoking cigarettes 14 days before surgery (helps w/healing and breathing).   Medications to   TAKE   Morning of Surgery MEDICATIONS TO TAKE THE MORNING OF SURGERY WITH A SIP OF WATER: ALLEGRA, PRAVASTATIN, AMLODIPINE, LEVOTHYROXINE    You may take these medications, IF NEEDED, the morning of surgery: NASAL SPRAY       Medications to STOP  before surgery Non-Steroidal anti-inflammatory Drugs (NSAID's): for example, Diclofenac(Voltaren),  Ibuprofen (Advil, Motrin), Naproxen (Aleve) 3 days    STOP all herbal supplements and vitamins(unless prescribed by your doctor), and fish oil for 7

## 2025-02-25 LAB
BACTERIA SPEC CULT: NORMAL
BACTERIA SPEC CULT: NORMAL
SERVICE CMNT-IMP: NORMAL

## 2025-03-03 RX ORDER — BISACODYL 10 MG
10 SUPPOSITORY, RECTAL RECTAL DAILY PRN
Status: CANCELLED | OUTPATIENT
Start: 2025-03-03

## 2025-03-03 RX ORDER — HYDROXYZINE HYDROCHLORIDE 10 MG/1
10 TABLET, FILM COATED ORAL EVERY 8 HOURS PRN
Status: CANCELLED | OUTPATIENT
Start: 2025-03-03

## 2025-03-03 RX ORDER — SODIUM CHLORIDE 9 MG/ML
INJECTION, SOLUTION INTRAVENOUS PRN
Status: CANCELLED | OUTPATIENT
Start: 2025-03-03

## 2025-03-03 RX ORDER — OXYCODONE HYDROCHLORIDE 5 MG/1
5 TABLET ORAL EVERY 4 HOURS PRN
Status: CANCELLED | OUTPATIENT
Start: 2025-03-03

## 2025-03-03 RX ORDER — KETOROLAC TROMETHAMINE 30 MG/ML
15 INJECTION, SOLUTION INTRAMUSCULAR; INTRAVENOUS EVERY 6 HOURS
Status: CANCELLED | OUTPATIENT
Start: 2025-03-03 | End: 2025-03-04

## 2025-03-03 RX ORDER — LOSARTAN POTASSIUM AND HYDROCHLOROTHIAZIDE 25; 100 MG/1; MG/1
1 TABLET ORAL EVERY MORNING
Status: CANCELLED | OUTPATIENT
Start: 2025-03-04

## 2025-03-03 RX ORDER — ASPIRIN 81 MG/1
81 TABLET ORAL 2 TIMES DAILY
Status: CANCELLED | OUTPATIENT
Start: 2025-03-04

## 2025-03-03 RX ORDER — SODIUM CHLORIDE 0.9 % (FLUSH) 0.9 %
5-40 SYRINGE (ML) INJECTION EVERY 12 HOURS SCHEDULED
Status: CANCELLED | OUTPATIENT
Start: 2025-03-03

## 2025-03-03 RX ORDER — LEVOTHYROXINE SODIUM 100 UG/1
100 TABLET ORAL
Status: CANCELLED | OUTPATIENT
Start: 2025-03-04

## 2025-03-03 RX ORDER — TRAMADOL HYDROCHLORIDE 50 MG/1
50 TABLET ORAL EVERY 6 HOURS PRN
Status: CANCELLED | OUTPATIENT
Start: 2025-03-03

## 2025-03-03 RX ORDER — AMLODIPINE BESYLATE 5 MG/1
5 TABLET ORAL DAILY
Status: CANCELLED | OUTPATIENT
Start: 2025-03-03

## 2025-03-03 RX ORDER — SENNA AND DOCUSATE SODIUM 50; 8.6 MG/1; MG/1
1 TABLET, FILM COATED ORAL 2 TIMES DAILY
Status: CANCELLED | OUTPATIENT
Start: 2025-03-03

## 2025-03-03 RX ORDER — POLYETHYLENE GLYCOL 3350 17 G/17G
17 POWDER, FOR SOLUTION ORAL DAILY
Status: CANCELLED | OUTPATIENT
Start: 2025-03-03

## 2025-03-03 RX ORDER — HYDROMORPHONE HYDROCHLORIDE 1 MG/ML
0.5 INJECTION, SOLUTION INTRAMUSCULAR; INTRAVENOUS; SUBCUTANEOUS
Status: CANCELLED | OUTPATIENT
Start: 2025-03-03

## 2025-03-03 RX ORDER — FAMOTIDINE 20 MG/1
20 TABLET, FILM COATED ORAL 2 TIMES DAILY
Status: CANCELLED | OUTPATIENT
Start: 2025-03-03

## 2025-03-03 RX ORDER — 0.9 % SODIUM CHLORIDE 0.9 %
500 INTRAVENOUS SOLUTION INTRAVENOUS PRN
Status: CANCELLED | OUTPATIENT
Start: 2025-03-03

## 2025-03-03 RX ORDER — ONDANSETRON 4 MG/1
4 TABLET, ORALLY DISINTEGRATING ORAL EVERY 8 HOURS PRN
Status: CANCELLED | OUTPATIENT
Start: 2025-03-03

## 2025-03-03 RX ORDER — SODIUM CHLORIDE 0.9 % (FLUSH) 0.9 %
5-40 SYRINGE (ML) INJECTION PRN
Status: CANCELLED | OUTPATIENT
Start: 2025-03-03

## 2025-03-03 RX ORDER — ONDANSETRON 2 MG/ML
4 INJECTION INTRAMUSCULAR; INTRAVENOUS EVERY 6 HOURS PRN
Status: CANCELLED | OUTPATIENT
Start: 2025-03-03

## 2025-03-03 RX ORDER — NALOXONE HYDROCHLORIDE 0.4 MG/ML
0.4 INJECTION, SOLUTION INTRAMUSCULAR; INTRAVENOUS; SUBCUTANEOUS PRN
Status: CANCELLED | OUTPATIENT
Start: 2025-03-03

## 2025-03-04 ENCOUNTER — ANESTHESIA EVENT (OUTPATIENT)
Facility: HOSPITAL | Age: 70
End: 2025-03-04
Payer: MEDICARE

## 2025-03-04 NOTE — DISCHARGE INSTRUCTIONS
Tylenol in 24 hours  Do not drink alcoholic beverages  Do not smoke  Do not drive until seen for follow up appointment  Do not place frozen gel pack directly on your skin. It can cause frostbite.   Do not take a tub bath, swim or get in a hot tub for 8 weeks  Prevention of falls and safety at home  Set up an area where you can rest comfortably leaving space around furniture to allow you to walk with your walker.  Keep stairs, hallways and bathrooms well lit; especially at night.  Arrange for care for your pets  Keep your home free of clutter.   Call Dr. Barba at [phone] for:  Pain that is not relieved by pain medication, ice and activity  Side effects of medications  Increased/spread of bruising  Warning signs of infection:  persistent fever greater than 100 degrees  shaking or chills  increased redness, tenderness, swelling or drainage from incision  increased pain during activity or rest  Warning signs of a blood clot in your leg:  increased pain in your calf  tenderness or redness  increased swelling or knee, calf, ankle or foot    Call [phone] after 5pm or on a weekend. The on call physician will return your phone call  Call your Primary Care Doctor for:   Concerns about your medical conditions such as diabetes, high blood pressure, asthma, congestive heart failure  Blood sugars greater than 180  Persistent headache or dizziness  Coughing or congestion  Constipation or diarrhea  Burning when you go to the bathroom  Abnormal heart rate (fast or  slow)      Call 911 and go to the nearest hospital for:   Sudden increased shortness of breath  Sudden onset of chest pain  Difficulty breathing  Localized chest pain with coughing or taking a deep breath

## 2025-03-05 ENCOUNTER — HOSPITAL ENCOUNTER (OUTPATIENT)
Facility: HOSPITAL | Age: 70
Setting detail: OBSERVATION
Discharge: HOME | End: 2025-03-05
Attending: ORTHOPAEDIC SURGERY | Admitting: ORTHOPAEDIC SURGERY
Payer: MEDICARE

## 2025-03-05 ENCOUNTER — ANESTHESIA (OUTPATIENT)
Facility: HOSPITAL | Age: 70
End: 2025-03-05
Payer: MEDICARE

## 2025-03-05 VITALS
OXYGEN SATURATION: 100 % | TEMPERATURE: 97.8 F | DIASTOLIC BLOOD PRESSURE: 80 MMHG | SYSTOLIC BLOOD PRESSURE: 131 MMHG | RESPIRATION RATE: 14 BRPM | HEART RATE: 79 BPM

## 2025-03-05 DIAGNOSIS — Z96.651 S/P TOTAL KNEE ARTHROPLASTY, RIGHT: Primary | ICD-10-CM

## 2025-03-05 PROBLEM — M17.11 OSTEOARTHRITIS OF RIGHT KNEE, UNSPECIFIED OSTEOARTHRITIS TYPE: Status: ACTIVE | Noted: 2025-03-05

## 2025-03-05 LAB
GLUCOSE BLD STRIP.AUTO-MCNC: 102 MG/DL (ref 65–117)
SERVICE CMNT-IMP: NORMAL

## 2025-03-05 PROCEDURE — 3700000000 HC ANESTHESIA ATTENDED CARE: Performed by: ORTHOPAEDIC SURGERY

## 2025-03-05 PROCEDURE — 97530 THERAPEUTIC ACTIVITIES: CPT

## 2025-03-05 PROCEDURE — C1713 ANCHOR/SCREW BN/BN,TIS/BN: HCPCS | Performed by: ORTHOPAEDIC SURGERY

## 2025-03-05 PROCEDURE — 82962 GLUCOSE BLOOD TEST: CPT

## 2025-03-05 PROCEDURE — 3700000001 HC ADD 15 MINUTES (ANESTHESIA): Performed by: ORTHOPAEDIC SURGERY

## 2025-03-05 PROCEDURE — 2500000003 HC RX 250 WO HCPCS: Performed by: ORTHOPAEDIC SURGERY

## 2025-03-05 PROCEDURE — 7100000010 HC PHASE II RECOVERY - FIRST 15 MIN: Performed by: ORTHOPAEDIC SURGERY

## 2025-03-05 PROCEDURE — 3600000015 HC SURGERY LEVEL 5 ADDTL 15MIN: Performed by: ORTHOPAEDIC SURGERY

## 2025-03-05 PROCEDURE — 6370000000 HC RX 637 (ALT 250 FOR IP): Performed by: PHYSICIAN ASSISTANT

## 2025-03-05 PROCEDURE — 2580000003 HC RX 258: Performed by: ANESTHESIOLOGY

## 2025-03-05 PROCEDURE — 6360000002 HC RX W HCPCS: Performed by: PHYSICIAN ASSISTANT

## 2025-03-05 PROCEDURE — 97535 SELF CARE MNGMENT TRAINING: CPT

## 2025-03-05 PROCEDURE — 2500000003 HC RX 250 WO HCPCS: Performed by: STUDENT IN AN ORGANIZED HEALTH CARE EDUCATION/TRAINING PROGRAM

## 2025-03-05 PROCEDURE — 97165 OT EVAL LOW COMPLEX 30 MIN: CPT

## 2025-03-05 PROCEDURE — 6360000002 HC RX W HCPCS: Performed by: ANESTHESIOLOGY

## 2025-03-05 PROCEDURE — 6360000002 HC RX W HCPCS: Performed by: STUDENT IN AN ORGANIZED HEALTH CARE EDUCATION/TRAINING PROGRAM

## 2025-03-05 PROCEDURE — 7100000000 HC PACU RECOVERY - FIRST 15 MIN: Performed by: ORTHOPAEDIC SURGERY

## 2025-03-05 PROCEDURE — 6360000002 HC RX W HCPCS: Performed by: ORTHOPAEDIC SURGERY

## 2025-03-05 PROCEDURE — 2580000003 HC RX 258: Performed by: PHYSICIAN ASSISTANT

## 2025-03-05 PROCEDURE — 7100000011 HC PHASE II RECOVERY - ADDTL 15 MIN: Performed by: ORTHOPAEDIC SURGERY

## 2025-03-05 PROCEDURE — C1776 JOINT DEVICE (IMPLANTABLE): HCPCS | Performed by: ORTHOPAEDIC SURGERY

## 2025-03-05 PROCEDURE — 3600000005 HC SURGERY LEVEL 5 BASE: Performed by: ORTHOPAEDIC SURGERY

## 2025-03-05 PROCEDURE — 2580000003 HC RX 258: Performed by: ORTHOPAEDIC SURGERY

## 2025-03-05 PROCEDURE — 2720000010 HC SURG SUPPLY STERILE: Performed by: ORTHOPAEDIC SURGERY

## 2025-03-05 PROCEDURE — 6360000002 HC RX W HCPCS

## 2025-03-05 PROCEDURE — 2580000003 HC RX 258: Performed by: STUDENT IN AN ORGANIZED HEALTH CARE EDUCATION/TRAINING PROGRAM

## 2025-03-05 PROCEDURE — 97116 GAIT TRAINING THERAPY: CPT

## 2025-03-05 PROCEDURE — 97161 PT EVAL LOW COMPLEX 20 MIN: CPT

## 2025-03-05 PROCEDURE — G0378 HOSPITAL OBSERVATION PER HR: HCPCS

## 2025-03-05 PROCEDURE — 7100000001 HC PACU RECOVERY - ADDTL 15 MIN: Performed by: ORTHOPAEDIC SURGERY

## 2025-03-05 PROCEDURE — 2709999900 HC NON-CHARGEABLE SUPPLY: Performed by: ORTHOPAEDIC SURGERY

## 2025-03-05 DEVICE — ATTUNE KNEE SYSTEM FEMORAL POROCOAT CRUCIATE RETAINING SIZE 8 RIGHT CEMENTLESS
Type: IMPLANTABLE DEVICE | Site: KNEE | Status: FUNCTIONAL
Brand: ATTUNE

## 2025-03-05 DEVICE — ATTUNE KNEE SYSTEM TIBIAL INSERT FIXED BEARING MEDIAL STABILIZED RIGHT AOX 8, 6MM
Type: IMPLANTABLE DEVICE | Site: KNEE | Status: FUNCTIONAL
Brand: ATTUNE

## 2025-03-05 DEVICE — COMPONENT PAT DIA38MM POLYETH DOME CEM MEDIALIZED ATTUNE: Type: IMPLANTABLE DEVICE | Site: PATELLA | Status: FUNCTIONAL

## 2025-03-05 DEVICE — KNEE K2 TOT HEMI ADV CMTLS IMPL CAPPED SYNTHES: Type: IMPLANTABLE DEVICE | Status: FUNCTIONAL

## 2025-03-05 DEVICE — SMARTSET GHV GENTAMICIN HIGH VISCOSITY BONE CEMENT 40G
Type: IMPLANTABLE DEVICE | Site: PATELLA | Status: FUNCTIONAL
Brand: SMARTSET

## 2025-03-05 DEVICE — ATTUNE KNEE SYSTEM TIBIAL BASE AFFIXIUM FIXED BEARING SIZE 8
Type: IMPLANTABLE DEVICE | Site: KNEE | Status: FUNCTIONAL
Brand: ATTUNE AFFIXIUM

## 2025-03-05 RX ORDER — LIDOCAINE HYDROCHLORIDE 10 MG/ML
1 INJECTION, SOLUTION EPIDURAL; INFILTRATION; INTRACAUDAL; PERINEURAL
Status: DISCONTINUED | OUTPATIENT
Start: 2025-03-05 | End: 2025-03-05 | Stop reason: HOSPADM

## 2025-03-05 RX ORDER — LABETALOL HYDROCHLORIDE 5 MG/ML
INJECTION, SOLUTION INTRAVENOUS
Status: DISCONTINUED | OUTPATIENT
Start: 2025-03-05 | End: 2025-03-05 | Stop reason: SDUPTHER

## 2025-03-05 RX ORDER — SODIUM CHLORIDE 0.9 % (FLUSH) 0.9 %
5-40 SYRINGE (ML) INJECTION EVERY 12 HOURS SCHEDULED
Status: DISCONTINUED | OUTPATIENT
Start: 2025-03-05 | End: 2025-03-05 | Stop reason: HOSPADM

## 2025-03-05 RX ORDER — TRANEXAMIC ACID 100 MG/ML
INJECTION, SOLUTION INTRAVENOUS
Status: DISCONTINUED | OUTPATIENT
Start: 2025-03-05 | End: 2025-03-05 | Stop reason: SDUPTHER

## 2025-03-05 RX ORDER — SENNA AND DOCUSATE SODIUM 50; 8.6 MG/1; MG/1
1 TABLET, FILM COATED ORAL 2 TIMES DAILY
Qty: 60 TABLET | Refills: 0 | Status: SHIPPED | OUTPATIENT
Start: 2025-03-05

## 2025-03-05 RX ORDER — EPHEDRINE SULFATE 50 MG/ML
INJECTION INTRAVENOUS
Status: DISCONTINUED | OUTPATIENT
Start: 2025-03-05 | End: 2025-03-05 | Stop reason: SDUPTHER

## 2025-03-05 RX ORDER — ONDANSETRON 2 MG/ML
INJECTION INTRAMUSCULAR; INTRAVENOUS
Status: DISCONTINUED | OUTPATIENT
Start: 2025-03-05 | End: 2025-03-05 | Stop reason: SDUPTHER

## 2025-03-05 RX ORDER — SODIUM CHLORIDE 0.9 % (FLUSH) 0.9 %
5-40 SYRINGE (ML) INJECTION PRN
Status: DISCONTINUED | OUTPATIENT
Start: 2025-03-05 | End: 2025-03-05 | Stop reason: HOSPADM

## 2025-03-05 RX ORDER — HYDROMORPHONE HYDROCHLORIDE 1 MG/ML
0.5 INJECTION, SOLUTION INTRAMUSCULAR; INTRAVENOUS; SUBCUTANEOUS EVERY 5 MIN PRN
Status: COMPLETED | OUTPATIENT
Start: 2025-03-05 | End: 2025-03-05

## 2025-03-05 RX ORDER — FENTANYL CITRATE 50 UG/ML
25 INJECTION, SOLUTION INTRAMUSCULAR; INTRAVENOUS EVERY 5 MIN PRN
Status: DISCONTINUED | OUTPATIENT
Start: 2025-03-05 | End: 2025-03-05 | Stop reason: HOSPADM

## 2025-03-05 RX ORDER — DEXAMETHASONE 4 MG/1
4 TABLET ORAL
Qty: 4 TABLET | Refills: 0 | Status: SHIPPED | OUTPATIENT
Start: 2025-03-05 | End: 2025-03-09

## 2025-03-05 RX ORDER — DEXAMETHASONE SODIUM PHOSPHATE 10 MG/ML
8 INJECTION, SOLUTION INTRAMUSCULAR; INTRAVENOUS ONCE
Status: DISCONTINUED | OUTPATIENT
Start: 2025-03-05 | End: 2025-03-05 | Stop reason: HOSPADM

## 2025-03-05 RX ORDER — DEXAMETHASONE SODIUM PHOSPHATE 4 MG/ML
INJECTION, SOLUTION INTRA-ARTICULAR; INTRALESIONAL; INTRAMUSCULAR; INTRAVENOUS; SOFT TISSUE
Status: DISCONTINUED | OUTPATIENT
Start: 2025-03-05 | End: 2025-03-05 | Stop reason: SDUPTHER

## 2025-03-05 RX ORDER — HYDROMORPHONE HYDROCHLORIDE 2 MG/ML
INJECTION, SOLUTION INTRAMUSCULAR; INTRAVENOUS; SUBCUTANEOUS
Status: DISCONTINUED | OUTPATIENT
Start: 2025-03-05 | End: 2025-03-05 | Stop reason: SDUPTHER

## 2025-03-05 RX ORDER — PROPOFOL 10 MG/ML
INJECTION, EMULSION INTRAVENOUS
Status: DISCONTINUED | OUTPATIENT
Start: 2025-03-05 | End: 2025-03-05 | Stop reason: SDUPTHER

## 2025-03-05 RX ORDER — ONDANSETRON 2 MG/ML
4 INJECTION INTRAMUSCULAR; INTRAVENOUS
Status: DISCONTINUED | OUTPATIENT
Start: 2025-03-05 | End: 2025-03-05 | Stop reason: HOSPADM

## 2025-03-05 RX ORDER — SODIUM CHLORIDE 9 MG/ML
INJECTION, SOLUTION INTRAVENOUS PRN
Status: DISCONTINUED | OUTPATIENT
Start: 2025-03-05 | End: 2025-03-05 | Stop reason: HOSPADM

## 2025-03-05 RX ORDER — TAMSULOSIN HYDROCHLORIDE 0.4 MG/1
0.4 CAPSULE ORAL 2 TIMES DAILY
Status: DISCONTINUED | OUTPATIENT
Start: 2025-03-05 | End: 2025-03-05 | Stop reason: HOSPADM

## 2025-03-05 RX ORDER — ACETAMINOPHEN 500 MG
1000 TABLET ORAL ONCE
Status: DISCONTINUED | OUTPATIENT
Start: 2025-03-05 | End: 2025-03-05 | Stop reason: SDUPTHER

## 2025-03-05 RX ORDER — OXYCODONE HYDROCHLORIDE 5 MG/1
5 TABLET ORAL EVERY 4 HOURS PRN
Qty: 42 TABLET | Refills: 0 | Status: SHIPPED | OUTPATIENT
Start: 2025-03-05 | End: 2025-03-12

## 2025-03-05 RX ORDER — SODIUM CHLORIDE, SODIUM LACTATE, POTASSIUM CHLORIDE, CALCIUM CHLORIDE 600; 310; 30; 20 MG/100ML; MG/100ML; MG/100ML; MG/100ML
INJECTION, SOLUTION INTRAVENOUS CONTINUOUS
Status: DISCONTINUED | OUTPATIENT
Start: 2025-03-05 | End: 2025-03-05 | Stop reason: HOSPADM

## 2025-03-05 RX ORDER — EPINEPHRINE 1 MG/ML(1)
AMPUL (ML) INJECTION PRN
Status: DISCONTINUED | OUTPATIENT
Start: 2025-03-05 | End: 2025-03-05 | Stop reason: HOSPADM

## 2025-03-05 RX ORDER — NALOXONE HYDROCHLORIDE 0.4 MG/ML
INJECTION, SOLUTION INTRAMUSCULAR; INTRAVENOUS; SUBCUTANEOUS PRN
Status: DISCONTINUED | OUTPATIENT
Start: 2025-03-05 | End: 2025-03-05 | Stop reason: HOSPADM

## 2025-03-05 RX ORDER — MIDAZOLAM HYDROCHLORIDE 2 MG/2ML
2 INJECTION, SOLUTION INTRAMUSCULAR; INTRAVENOUS PRN
Status: DISCONTINUED | OUTPATIENT
Start: 2025-03-05 | End: 2025-03-05 | Stop reason: HOSPADM

## 2025-03-05 RX ORDER — FENTANYL CITRATE 50 UG/ML
100 INJECTION, SOLUTION INTRAMUSCULAR; INTRAVENOUS
Status: DISCONTINUED | OUTPATIENT
Start: 2025-03-05 | End: 2025-03-05 | Stop reason: HOSPADM

## 2025-03-05 RX ORDER — MELOXICAM 7.5 MG/1
7.5 TABLET ORAL DAILY
Qty: 30 TABLET | Refills: 0 | Status: SHIPPED | OUTPATIENT
Start: 2025-03-05

## 2025-03-05 RX ORDER — POLYETHYLENE GLYCOL 3350 17 G/17G
17 POWDER, FOR SOLUTION ORAL DAILY
Qty: 7 EACH | Refills: 0 | Status: SHIPPED | OUTPATIENT
Start: 2025-03-05 | End: 2025-04-04

## 2025-03-05 RX ORDER — ASPIRIN 81 MG/1
81 TABLET ORAL 2 TIMES DAILY
Qty: 60 TABLET | Refills: 0 | Status: SHIPPED | OUTPATIENT
Start: 2025-03-05 | End: 2025-04-04

## 2025-03-05 RX ORDER — ACETAMINOPHEN 325 MG/1
650 TABLET ORAL EVERY 6 HOURS
Status: DISCONTINUED | OUTPATIENT
Start: 2025-03-05 | End: 2025-03-05 | Stop reason: HOSPADM

## 2025-03-05 RX ORDER — PROCHLORPERAZINE EDISYLATE 5 MG/ML
5 INJECTION INTRAMUSCULAR; INTRAVENOUS
Status: DISCONTINUED | OUTPATIENT
Start: 2025-03-05 | End: 2025-03-05 | Stop reason: HOSPADM

## 2025-03-05 RX ORDER — FAMOTIDINE 20 MG/1
20 TABLET, FILM COATED ORAL 2 TIMES DAILY
Qty: 60 TABLET | Refills: 0 | Status: SHIPPED | OUTPATIENT
Start: 2025-03-05

## 2025-03-05 RX ORDER — CELECOXIB 100 MG/1
100 CAPSULE ORAL ONCE
Status: COMPLETED | OUTPATIENT
Start: 2025-03-05 | End: 2025-03-05

## 2025-03-05 RX ORDER — HYDRALAZINE HYDROCHLORIDE 20 MG/ML
10 INJECTION INTRAMUSCULAR; INTRAVENOUS
Status: DISCONTINUED | OUTPATIENT
Start: 2025-03-05 | End: 2025-03-05 | Stop reason: HOSPADM

## 2025-03-05 RX ORDER — SODIUM CHLORIDE 9 MG/ML
INJECTION, SOLUTION INTRAVENOUS CONTINUOUS
Status: DISCONTINUED | OUTPATIENT
Start: 2025-03-05 | End: 2025-03-05 | Stop reason: HOSPADM

## 2025-03-05 RX ORDER — LIDOCAINE HYDROCHLORIDE 10 MG/ML
INJECTION, SOLUTION EPIDURAL; INFILTRATION; INTRACAUDAL; PERINEURAL PRN
Status: DISCONTINUED | OUTPATIENT
Start: 2025-03-05 | End: 2025-03-05 | Stop reason: HOSPADM

## 2025-03-05 RX ORDER — ACETAMINOPHEN 500 MG
1000 TABLET ORAL ONCE
Status: COMPLETED | OUTPATIENT
Start: 2025-03-05 | End: 2025-03-05

## 2025-03-05 RX ORDER — MIDAZOLAM HYDROCHLORIDE 1 MG/ML
INJECTION, SOLUTION INTRAMUSCULAR; INTRAVENOUS
Status: DISCONTINUED | OUTPATIENT
Start: 2025-03-05 | End: 2025-03-05 | Stop reason: SDUPTHER

## 2025-03-05 RX ORDER — TRANEXAMIC ACID 100 MG/ML
INJECTION, SOLUTION INTRAVENOUS PRN
Status: DISCONTINUED | OUTPATIENT
Start: 2025-03-05 | End: 2025-03-05 | Stop reason: HOSPADM

## 2025-03-05 RX ORDER — OXYCODONE HYDROCHLORIDE 5 MG/1
5 TABLET ORAL
Status: DISCONTINUED | OUTPATIENT
Start: 2025-03-05 | End: 2025-03-05 | Stop reason: HOSPADM

## 2025-03-05 RX ADMIN — HYDROMORPHONE HYDROCHLORIDE 0.5 MG: 1 INJECTION, SOLUTION INTRAMUSCULAR; INTRAVENOUS; SUBCUTANEOUS at 11:40

## 2025-03-05 RX ADMIN — PROPOFOL 20 MG: 10 INJECTION, EMULSION INTRAVENOUS at 08:59

## 2025-03-05 RX ADMIN — ONDANSETRON 4 MG: 2 INJECTION, SOLUTION INTRAMUSCULAR; INTRAVENOUS at 08:58

## 2025-03-05 RX ADMIN — TAMSULOSIN HYDROCHLORIDE 0.4 MG: 0.4 CAPSULE ORAL at 16:30

## 2025-03-05 RX ADMIN — ACETAMINOPHEN 650 MG: 325 TABLET ORAL at 14:39

## 2025-03-05 RX ADMIN — EPHEDRINE SULFATE 15 MG: 50 INJECTION INTRAVENOUS at 08:43

## 2025-03-05 RX ADMIN — CEFAZOLIN 3000 MG: 3 INJECTION, POWDER, FOR SOLUTION INTRAVENOUS at 14:51

## 2025-03-05 RX ADMIN — SODIUM CHLORIDE 3000 MG: 9 INJECTION, SOLUTION INTRAVENOUS at 08:58

## 2025-03-05 RX ADMIN — Medication 10 MG: at 10:13

## 2025-03-05 RX ADMIN — HYDROMORPHONE HYDROCHLORIDE 0.4 MG: 2 INJECTION, SOLUTION INTRAMUSCULAR; INTRAVENOUS; SUBCUTANEOUS at 08:55

## 2025-03-05 RX ADMIN — HYDROMORPHONE HYDROCHLORIDE 0.5 MG: 1 INJECTION, SOLUTION INTRAMUSCULAR; INTRAVENOUS; SUBCUTANEOUS at 11:52

## 2025-03-05 RX ADMIN — HYDROMORPHONE HYDROCHLORIDE 0.2 MG: 2 INJECTION, SOLUTION INTRAMUSCULAR; INTRAVENOUS; SUBCUTANEOUS at 09:43

## 2025-03-05 RX ADMIN — SODIUM CHLORIDE, SODIUM LACTATE, POTASSIUM CHLORIDE, AND CALCIUM CHLORIDE: .6; .31; .03; .02 INJECTION, SOLUTION INTRAVENOUS at 08:01

## 2025-03-05 RX ADMIN — DEXAMETHASONE SODIUM PHOSPHATE 8 MG: 4 INJECTION INTRA-ARTICULAR; INTRALESIONAL; INTRAMUSCULAR; INTRAVENOUS; SOFT TISSUE at 08:58

## 2025-03-05 RX ADMIN — PROPOFOL 80 MCG/KG/MIN: 10 INJECTION, EMULSION INTRAVENOUS at 08:55

## 2025-03-05 RX ADMIN — TRANEXAMIC ACID 1000 MG: 100 INJECTION INTRAVENOUS at 09:53

## 2025-03-05 RX ADMIN — HYDROMORPHONE HYDROCHLORIDE 0.6 MG: 2 INJECTION, SOLUTION INTRAMUSCULAR; INTRAVENOUS; SUBCUTANEOUS at 08:39

## 2025-03-05 RX ADMIN — TRANEXAMIC ACID 1000 MG: 100 INJECTION INTRAVENOUS at 09:02

## 2025-03-05 RX ADMIN — MIDAZOLAM 2 MG: 1 INJECTION INTRAMUSCULAR; INTRAVENOUS at 08:24

## 2025-03-05 RX ADMIN — MEPIVACAINE HYDROCHLORIDE 3.5 ML: 15 INJECTION, SOLUTION EPIDURAL; INFILTRATION at 08:44

## 2025-03-05 RX ADMIN — CELECOXIB 100 MG: 100 CAPSULE ORAL at 08:00

## 2025-03-05 RX ADMIN — ACETAMINOPHEN 1000 MG: 500 TABLET ORAL at 08:00

## 2025-03-05 RX ADMIN — EPHEDRINE SULFATE 25 MG: 50 INJECTION INTRAVENOUS at 08:55

## 2025-03-05 ASSESSMENT — PAIN SCALES - GENERAL
PAINLEVEL_OUTOF10: 3
PAINLEVEL_OUTOF10: 7
PAINLEVEL_OUTOF10: 7

## 2025-03-05 ASSESSMENT — PAIN DESCRIPTION - LOCATION
LOCATION: KNEE
LOCATION: KNEE

## 2025-03-05 ASSESSMENT — PAIN DESCRIPTION - ORIENTATION
ORIENTATION: RIGHT
ORIENTATION: RIGHT

## 2025-03-05 ASSESSMENT — PAIN - FUNCTIONAL ASSESSMENT
PAIN_FUNCTIONAL_ASSESSMENT: NONE - DENIES PAIN

## 2025-03-05 ASSESSMENT — PAIN DESCRIPTION - DESCRIPTORS: DESCRIPTORS: ACHING

## 2025-03-05 NOTE — ANESTHESIA PROCEDURE NOTES
Spinal Block    Patient location during procedure: holding area  End time: 3/5/2025 8:48 AM  Reason for block: primary anesthetic and at surgeon's request  Staffing  Performed: anesthesiologist   Anesthesiologist: Tanner Damon MD  Performed by: Tanner Damon MD  Authorized by: Tanner Damon MD    Spinal Block  Patient position: sitting  Prep: ChloraPrep and site prepped and draped  Patient monitoring: cardiac monitor, continuous pulse ox, continuous capnometry, frequent blood pressure checks and oxygen  Approach: midline  Location: L3/L4  Provider prep: sterile gloves and mask  Local infiltration: lidocaine  Needle  Needle type: Quincke   Needle gauge: 22 G  Needle length: 3.5 in  Assessment  Swirl obtained: Yes  CSF: clear  Attempts: 2  Hemodynamics: stable  Preanesthetic Checklist  Completed: patient identified, IV checked, site marked, risks and benefits discussed, surgical/procedural consents, equipment checked, pre-op evaluation, timeout performed, anesthesia consent given, oxygen available, monitors applied/VS acknowledged, fire risk safety assessment completed and verbalized and blood product R/B/A discussed and consented

## 2025-03-05 NOTE — PERIOP NOTE
Bladder scanned patient for 140 cc, He says he voided around 8am before going into OR. I called Dr Ludy Vences after I was unable to reach him on cell phone, She said he can go upstairs to his room and if able to void can go home. The floor should follow the bladder scan orders. She also gave me an order for flomax bid.Patient says his wife went home to take care of some thing and will be gone for about 120 minutes,

## 2025-03-05 NOTE — PROGRESS NOTES
Ortho NP Note    POD# 0  s/p RIGHT TOTAL KNEE ARTHROPLASTY (VELYS)     Pt resting on stretcher in PACU. Drowsy, easily wakes to voice.   BLE numbness resolved and motor intact s/p spinal block   Reports postop knee pain increasing, rating 7/10   Denies nausea.   Postop plan reviewed - He would like to return home today if able. Reviewed goals for fast track discharge.     VSS Afebrile.    Visit Vitals  BP (!) 140/73   Pulse 78   Temp 97.4 °F (36.3 °C) (Oral)   Resp 21   SpO2 99%       Voiding status: due to viod.          Labs    Lab Results   Component Value Date/Time    HGB 12.7 08/15/2023 02:24 AM      Lab Results   Component Value Date/Time    INR 1.0 02/24/2025 03:15 PM      Lab Results   Component Value Date/Time     08/15/2023 02:24 AM    K 3.5 08/15/2023 02:24 AM     08/15/2023 02:24 AM    CO2 24 08/15/2023 02:24 AM    BUN 16 08/15/2023 02:24 AM     Recent Glucose Results:   Glucose   Date Value Ref Range Status   08/15/2023 94 65 - 100 mg/dL Final   08/14/2023 108 (H) 65 - 100 mg/dL Final   08/12/2023 107 (H) 65 - 100 mg/dL Final           There is no height or weight on file to calculate BMI. : A BMI > 30 is classified as obesity and > 40 is classified as morbid obesity.       Ace wrap dressing c.d.i  Cryotherapy in place over incision  Calves soft and supple; No pain with passive stretch  Sensation and motor intact. +PF/DF/EHL intact 5/5, +pp  SCDs for mechanical DVT proph while in bed     PLAN:  1) PT BID, OT - WBAT  2) Aspirin 81 mg PO BID for DVT Prophylaxis. Encouraged early mobilization, bed exercises, and SCD use.  3) GI Prophylaxis - Pepcid    4) Pain control - scheduled tylenol  and toradol, and prn  oxycodone . Start Mobic and decadron on POD1.   5) Post op care - Continue bowel regimen, encouraged IS. Straight cath per protocol. Prineo to remain in place until follow up unless integrity is lost.    6) Readniess for discharge:     [x] Vital Signs stable    [] Hgb stable    [] + 
PHYSICAL THERAPY EVALUATION/DISCHARGE    Patient: Johnathon Madden (69 y.o. male)  Date: 3/5/2025  Primary Diagnosis: Arthritis of right knee [M17.11]  Osteoarthritis of right knee, unspecified osteoarthritis type [M17.11]  Procedure(s) (LRB):  RIGHT TOTAL KNEE ARTHROPLASTY (VELYS) (Right) Day of Surgery   Precautions: Restrictions/Precautions  Restrictions/Precautions: Weight Bearing, Fall Risk Lower Extremity Weight Bearing Restrictions  Right Lower Extremity Weight Bearing: Weight Bearing As Tolerated          ASSESSMENT AND RECOMMENDATIONS:  Based on the objective data below, the patient presents POD # 0 right TKR with pain right knee, decreased AROM/strength and function right leg, decreased activity tolerance, decline in functional mobility and impaired standing balance/gait with RW.  Pt s/p left TKR 2018.  Pt mobilized easily with mild c/o dizziness which resolved - Bp stable in standing.  Pt amb with RW functional household distances and performed stairs with standby to contact guard.  Pt cleared for discharge from PT standpoint.      Discharge Instructions:  Reviewed and performed supine TKA exercise.   Reviewed and instructed in proper positioning to avoid external rotation and knee flexion in supine or recliner position - using blanket roll to support leg in neutral position.  Reviewed importance of not placing pillow under knee to position knee in prolonged flexion.  Instructed pt in proper use of ice and elevation to decrease pain and swelling and progressive walking program as tolerated.  Patient instructed to have wife provide standby guard when OOB/amb/stairs - for rest of day and during the night - due to potential decreases in BP - dizziness.   Pt able to verbalize understanding of discharge education.      Functional Outcome Measure:  The patient scored 21/24 on the AM-PAC outcome measure which is indicative of  a Cutoff score <=171,2,3 had higher odds of discharging home with home health or need 
with Button/Zipper : Contact Guard Assistance and Seated EOB  - Socks : Contact Guard Assist and Seated EOB  - LE Adaptive Equipment Used : Sock Aide, Reacher, and Dressing Stick              Bathing:  -do not submerge or soak wound in water  -follow doctors instructions on wound care and bandage   -only touch incisions with clean hands  -don't scrub over incisions and use a clean wash cloth and towel each time with bathing until incisions are healed  - shower or sponge bathe if indicated  -Patient did indicate understanding as evidenced by teachback.   -if any questions arise pt was educated to contact their surgeon's office    Dressing joint: Patient instructed and demonstrated understanding to don/doff right LE first/last with minimal cues. Patient instructed and demonstrated to don all clothing while sitting prior to standing, doff all clothing to knees while standing, then sit to doff clothing off from knees to feet to facilitate fall prevention, pain management, and energy conservation with Riley.     Home safety: Patient instructed on home modifications and safety (raise height of ADL objects, appropriate height of chair surfaces, recliner safety, change of floor surfaces, clear pathways) to increase independence and fall prevention.  Patient indicated understanding.    Standing: Patient instructed and demonstrated during ADLs to walk up to sink/counter top/surfaces, step into walker to increase safety of joint and fall prevention with Modified Riley. Patient educated about knee anatomy verbally and educated to avoid rotation of right LE.  Instructed to apply concept to ADLs within the home (no twisting of knee during reaching across body, square off while using objects, slide objects along surfaces).  Patient instructed to increase amount of time standing, observe standing position during ADLs in order to increase even weight bearing through bilateral LEs in order to increase 
stand-by assist

## 2025-03-05 NOTE — H&P
Update History & Physical    The patient's History and Physical  was reviewed with the patient and I examined the patient. There was no change. The surgical site was confirmed by the patient and me.       Plan: The risks, benefits, expected outcome, and alternative to the recommended procedure have been discussed with the patient. Patient understands and wants to proceed with the procedure.     Electronically signed by Daquan Barba MD on 3/5/2025 at 7:28 AM

## 2025-03-05 NOTE — ANESTHESIA PRE PROCEDURE
\"EKC9JUN\", \"DDJ0ZAK\", \"BEART\", \"M9ZEZVWQ\"     Type & Screen (If Applicable):  No results found for: \"LABABO\"    Drug/Infectious Status (If Applicable):  No results found for: \"HIV\", \"HEPCAB\"    COVID-19 Screening (If Applicable):   Lab Results   Component Value Date/Time    COVID19 Not detected 08/12/2023 10:34 AM    COVID19 Not Detected 06/25/2021 10:00 AM    COVID19 Performed 06/25/2021 10:00 AM           Anesthesia Evaluation  Patient summary reviewed and Nursing notes reviewed   no history of anesthetic complications:   Airway: Mallampati: II     Neck ROM: full  Mouth opening: > = 3 FB   Dental:    (+) partials      Pulmonary:normal exam    (+)     sleep apnea: on noncompliant,                                  Cardiovascular:  Exercise tolerance: poor (<4 METS)  (+) hypertension:, hyperlipidemia                  Neuro/Psych:   Negative Neuro/Psych ROS              GI/Hepatic/Renal:   (+) GERD:, renal disease: kidney stones, morbid obesity          Endo/Other:    (+) hypothyroidism: arthritis:., malignancy/cancer.                  ROS comment: Hx melanoma   Abdominal:             Vascular: negative vascular ROS.         Other Findings:         Anesthesia Plan      TIVA, MAC and spinal     ASA 3       Induction: intravenous.    MIPS: Postoperative opioids intended and Prophylactic antiemetics administered.  Anesthetic plan and risks discussed with patient.    Use of blood products discussed with patient whom consented to blood products.      Attending anesthesiologist reviewed and agrees with Preprocedure content              Tanner Damon MD   3/5/2025

## 2025-03-05 NOTE — PERIOP NOTE
PATIENT WAS ASSISTED TO BATHROOM AND VOIDED. HE CALLED HIS WIFE. SHE WAS STILL AT HOME, BUT LEAVING TO PICK HIM UP. SHE DOES NOT WANT TO COME IN AND WANTS TO GET THE DISCHARGE INSTRUCTIONS OVER THE PHONE.

## 2025-03-05 NOTE — OP NOTE
Name: Johnathon Madden  MRN:  [id number]  : 1955  Age:  69 y.o.  Surgery Date: 3/5/2025      [facility]    PREOPERATIVE DIAGNOSIS: Osteoarthritis, right knee.    POSTOPERATIVE DIAGNOSIS: Osteoarthritis, right knee.    PROCEDURE PERFORMED: Computer assisted Right total knee arthroplasty [employer]    SURGEON: Daquan Barba MD    FIRST ASSISTANT:  Brianne Diaz PA-C    ANESTHESIA: Spinal    PRE-OP ANTIBIOTIC: Ancef 2g    COMPLICATIONS: None.    ESTIMATED BLOOD LOSS: 100 mL.    SPECIMENS REMOVED: None.    COMPONENTS IMPLANTED:   Implant Name Type Inv. Item Serial No.  Lot No. LRB No. Used Action   CEMENT BNE 40GM FULL DOSE PMMA W/ GENT HI VISC RADPQ LNG - SNA  CEMENT BNE 40GM FULL DOSE PMMA W/ GENT HI VISC RADPQ LNG NA [facility] [facility] [id number] Right 1 Implanted   COMPONENT PAT [id number] POLYETH DOME VALENTINE MEDIALIZED ATTUNE - SNA  COMPONENT PAT [id number] POLYETH DOME VALENTINE MEDIALIZED ATTUNE NA [facility] [id number] Right 1 Implanted   ATTUNE FEM POR CR RT SZ 8 - SNA  ATTUNE FEM POR CR RT SZ 8 NA [facility] [id number] Right 1 Implanted   BEARING TIB FIX 8 KNEE BASE POROUS ATTUNE AFFIXIUM - SNA  BEARING TIB FIX 8 KNEE BASE POROUS ATTUNE AFFIXIUM NA [facility] [id number] Right 1 Implanted   INSERT TIB FIX BEAR 8 6 MM RT MEDL KNEE STBL AOX ATTUNE - SNA  INSERT TIB FIX BEAR 8 6 MM RT MEDL KNEE STBL AOX ATTUNE NA [facility] [id number] Right 1 Implanted       INDICATIONS: The patient is an 69 yrs male with progressive debilitating right knee pain due to severe osteoarthritis. Symptoms have progressed despite comprehensive conservative treatment and they presents for right total knee replacement. Risks, benefits, alternatives of the procedure were reviewed in detail and the patient desired to proceed. Risks including bleeding, infection, damage to surrounding structures, blood

## 2025-03-05 NOTE — CARE COORDINATION
Referral sent to [facility] via [facility] and accepted. AVS updated.    Sasha Guajardo RN/CRM  [phone]

## 2025-03-06 NOTE — ANESTHESIA POSTPROCEDURE EVALUATION
Department of Anesthesiology  Postprocedure Note    Patient: Johnathon Madden  MRN: [id number]  YOB: 1955  Date of evaluation: 3/6/2025    Procedure Summary       Date: 03/05/25 Room / Location: [facility] MAIN OR [address] [place] [address] MAIN OR    Anesthesia Start: 0821 Anesthesia Stop: 1031    Procedure: RIGHT TOTAL KNEE ARTHROPLASTY (VELYS) (Right: Knee) Diagnosis:       Arthritis of right knee      (Arthritis of right knee [M17.11])    Providers: Daquan Barba MD Responsible Provider: Tanner Damon MD    Anesthesia Type: TIVA, MAC, spinal ASA Status: 3            Anesthesia Type: No value filed.    Shereen Phase I: Shereen Score: 10    Shereen Phase II: Shereen Score: 10    Anesthesia Post Evaluation    Patient location during evaluation: PACU  Patient participation: complete - patient participated  Level of consciousness: awake and alert  Airway patency: patent  Nausea & Vomiting: no nausea  Cardiovascular status: hemodynamically stable  Respiratory status: acceptable  Hydration status: euvolemic  Multimodal analgesia pain management approach  Pain management: adequate        No notable events documented.

## 2025-03-27 ENCOUNTER — HOSPITAL ENCOUNTER (EMERGENCY)
Facility: HOSPITAL | Age: 70
Discharge: HOME | End: 2025-03-27
Attending: EMERGENCY MEDICINE
Payer: MEDICARE

## 2025-03-27 VITALS
SYSTOLIC BLOOD PRESSURE: 142 MMHG | TEMPERATURE: 98.2 F | WEIGHT: 263.89 LBS | DIASTOLIC BLOOD PRESSURE: 75 MMHG | BODY MASS INDEX: 39.09 KG/M2 | HEIGHT: 69 IN | OXYGEN SATURATION: 98 % | RESPIRATION RATE: 24 BRPM | HEART RATE: 85 BPM

## 2025-03-27 DIAGNOSIS — K64.8 INTERNAL HEMORRHOIDS: Primary | ICD-10-CM

## 2025-03-27 PROCEDURE — 99283 EMERGENCY DEPT VISIT LOW MDM: CPT

## 2025-03-27 PROCEDURE — 6370000000 HC RX 637 (ALT 250 FOR IP): Performed by: EMERGENCY MEDICINE

## 2025-03-27 RX ORDER — PRAMOXINE HYDROCHLORIDE 10 MG/G
AEROSOL, FOAM TOPICAL
Qty: 15 G | Refills: 0 | Status: SHIPPED | OUTPATIENT
Start: 2025-03-27

## 2025-03-27 RX ORDER — LIDOCAINE HYDROCHLORIDE 20 MG/ML
JELLY TOPICAL PRN
Status: DISCONTINUED | OUTPATIENT
Start: 2025-03-27 | End: 2025-03-27 | Stop reason: HOSPADM

## 2025-03-27 RX ADMIN — LIDOCAINE HYDROCHLORIDE: 20 JELLY TOPICAL at 05:35

## 2025-03-27 ASSESSMENT — LIFESTYLE VARIABLES
HOW MANY STANDARD DRINKS CONTAINING ALCOHOL DO YOU HAVE ON A TYPICAL DAY: 1 OR 2
HOW OFTEN DO YOU HAVE A DRINK CONTAINING ALCOHOL: MONTHLY OR LESS

## 2025-03-27 ASSESSMENT — PAIN DESCRIPTION - ONSET: ONSET: ON-GOING

## 2025-03-27 ASSESSMENT — PAIN DESCRIPTION - FREQUENCY: FREQUENCY: INTERMITTENT

## 2025-03-27 ASSESSMENT — PAIN - FUNCTIONAL ASSESSMENT
PAIN_FUNCTIONAL_ASSESSMENT: 0-10
PAIN_FUNCTIONAL_ASSESSMENT: PREVENTS OR INTERFERES SOME ACTIVE ACTIVITIES AND ADLS

## 2025-03-27 ASSESSMENT — PAIN SCALES - GENERAL: PAINLEVEL_OUTOF10: 3

## 2025-03-27 ASSESSMENT — ENCOUNTER SYMPTOMS
VOMITING: 0
RECTAL PAIN: 1
CONSTIPATION: 1
DIARRHEA: 0
SHORTNESS OF BREATH: 0
ANAL BLEEDING: 1
NAUSEA: 0
ABDOMINAL PAIN: 0

## 2025-03-27 ASSESSMENT — PAIN DESCRIPTION - DESCRIPTORS: DESCRIPTORS: BURNING

## 2025-03-27 ASSESSMENT — PAIN DESCRIPTION - LOCATION: LOCATION: RECTUM

## 2025-03-27 ASSESSMENT — PAIN DESCRIPTION - PAIN TYPE: TYPE: ACUTE PAIN

## 2025-03-27 ASSESSMENT — PAIN DESCRIPTION - ORIENTATION: ORIENTATION: MID;LEFT

## 2025-03-27 NOTE — ED PROVIDER NOTES
EMERGENCY DEPARTMENT HISTORY AND PHYSICAL EXAM     ----------------------------------------------------------------------------  Please note that this dictation was completed with Hotswap, the computer voice recognition software.  Quite often unanticipated grammatical, syntax, homophones, and other interpretive errors are inadvertently transcribed by the computer software.  Please disregard these errors.  Please excuse any errors that have escaped final proofreading  ----------------------------------------------------------------------------      Date: 3/27/2025  Patient Name: Johnathon Madden      HISTORY OF PRESENT ILLNESS     Chief Complaint   Patient presents with    Hemorrhoids     Patient to ED from home with bleeding from hemorrhoids; patient states they are external with L sided swelling. Patient used OTC medications at home without relief.       History obtainted from:  Patient    Other independent source of history: wife    HPI: Johnathon Madden is a 69 y.o. male, with significant pmhx of recurring hemorrhoids, constipation due to medications after knee replacement surgery earlier, who presents via private vehicle to the ED with c/o acute exacerbation of chronic internal hemorrhoids with ongoing constipation.  Notes that he has been trying to take MiraLAX with his pain medicine from his recent knee surgery but is not having increased pain today.  Attempted to apply Preparation H without much relief.  Noted to have some bleeding raising his concern prompting him to come the ED.  Patient without active bleeding during evaluation.  Denies chest pain, shortness of breath, lightheaded sensation.      PCP: Rey Richards MD    Allergy List:   Allergies   Allergen Reactions    Latex     Penicillins Rash     Patient screened for any delayed non-IgE-mediated reaction to PCN.        Patient notes the following:    No delayed non-IgE-mediated reaction to PCN    Patient screened for any delayed non-IgE-mediated reaction

## 2025-03-27 NOTE — ED NOTES
Patient discharged from the ED by Dr. Gipson. Diagnosis, medications, precautions and follow-ups were reviewed with the patient/family. Questions were asked and answered prior to departure. Patient departed the ED via ambulation and was accompanied by his wife.

## 2025-03-27 NOTE — DISCHARGE INSTRUCTIONS
It was a pleasure taking care of you in our Emergency Department today.  We know that when you come to [facility], you are entrusting us with your health, comfort, and safety.  Our physicians and nurses honor that trust, and truly appreciate the opportunity to care for you and your loved ones.      We also value your feedback.  If you receive a survey about your Emergency Department experience today, please fill it out.  We care about our patients' feedback, and we listen to what you have to say.  Thank you!      Dr. Mya Gipson MD.

## (undated) DEVICE — GOWN,SIRUS,NONRNF,SETINSLV,2XL,18/CS: Brand: MEDLINE

## (undated) DEVICE — PACK SURG PROC KNEE USER GPS

## (undated) DEVICE — GLOVE SURG SZ 65 L12IN FNGR THK94MIL STD WHT LTX FREE

## (undated) DEVICE — SPONGE GZ W4XL4IN COT RADPQ HIGHLY ABSRB

## (undated) DEVICE — SNARE ENDOSCP L240CM LOOP W27MM SHTH DIA2.4MM WRK CHN 2.8MM

## (undated) DEVICE — ZIMMER® STERILE DISPOSABLE TOURNIQUET CUFF WITH PLC, DUAL PORT, SINGLE BLADDER, 34 IN. (86 CM)

## (undated) DEVICE — SPONGE GZ W4XL4IN COT 12 PLY TYP VII WVN C FLD DSGN STERILE

## (undated) DEVICE — INFECTION CONTROL KIT SYS

## (undated) DEVICE — DRAPE EQUIP ROBOT STRL VELYS 20/PK ORDER IN MULTIIPLES OF 20 EACH

## (undated) DEVICE — 2108 SERIES SAGITTAL BLADE, NO OFFSET  (12.4 X 1.19 X 82.1MM)

## (undated) DEVICE — REM POLYHESIVE ADULT PATIENT RETURN ELECTRODE: Brand: VALLEYLAB

## (undated) DEVICE — SUTURE VCRL SZ 2-0 L36IN ABSRB UD L40MM CT 1/2 CIR J957H

## (undated) DEVICE — SUTURE VCRL SZ 1 L36IN ABSRB UD L36MM CT-1 1/2 CIR J947H

## (undated) DEVICE — SOLUTION IRRIG 3000ML 0.9% SOD CHL FLX CONT 0797208] ICU MEDICAL INC]

## (undated) DEVICE — SUTURE ETHBND EXCEL SZ 1 L30IN NONABSORBABLE GRN L36MM CT-1 X425H

## (undated) DEVICE — DRAPE EQUIP SATELLITE STN STRL VELYS

## (undated) DEVICE — NEEDLE HYPO 18GA L1.5IN PNK S STL HUB POLYPR SHLD REG BVL

## (undated) DEVICE — SOLUTION SURG PREP 26 CC PURPREP

## (undated) DEVICE — BANDAGE COMPR M W6INXL10YD WHT BGE VELC E MTRX HK AND LOOP

## (undated) DEVICE — BOWL BNE CEM MIX SPAT CURET SMARTMIX CTS

## (undated) DEVICE — ENDOSCOPIC KIT COMPLIANCE ENDOKIT

## (undated) DEVICE — HANDPIECE SET WITH BONE CLEANING TIP AND SUCTION TUBE: Brand: INTERPULSE

## (undated) DEVICE — Z DISCONTINUED USE 2744636  DRESSING AQUACEL 14 IN ALG W3.5XL14IN POLYUR FLM CVR W/ HYDRCOLL

## (undated) DEVICE — GLOVE SURG SZ 65 L12IN FNGR THK79MIL GRN LTX FREE

## (undated) DEVICE — DRAPE,EXTREMITY,89X128,STERILE: Brand: MEDLINE

## (undated) DEVICE — 2108 SERIES SAGITTAL BLADE AGGRESSIVE  (25.0 X 1.19 X 85.0MM)

## (undated) DEVICE — GOWN,NON-REINFORCED,XXL: Brand: MEDLINE

## (undated) DEVICE — 3M™ STERI-DRAPE™ U-DRAPE 1015: Brand: STERI-DRAPE™

## (undated) DEVICE — T4 HOOD

## (undated) DEVICE — IMMOBILIZER SHLDR M UNIV 65X17IN BLK LIGHWEIGHT PCH SZ REUSE

## (undated) DEVICE — CONTAINER,SPECIMEN,3OZ,OR STRL: Brand: MEDLINE

## (undated) DEVICE — PREP SKN PREVAIL 40ML APPL --

## (undated) DEVICE — PREP KIT PEEL PTCH POVIDONE IOD

## (undated) DEVICE — PAD,ABDOMINAL,5"X9",ST,LF,25/BX: Brand: MEDLINE INDUSTRIES, INC.

## (undated) DEVICE — SMOKE EVACUATION PENCIL: Brand: VALLEYLAB

## (undated) DEVICE — 4-PORT MANIFOLD: Brand: NEPTUNE 2

## (undated) DEVICE — SOL IRR SOD CL 0.9% 1000ML BTL --

## (undated) DEVICE — PIN DRL 4X125 MM ROBOTIC-ASSISTED SOLUTION ARRY VELYS

## (undated) DEVICE — STRIP,CLOSURE,WOUND,MEDI-STRIP,1/2X4: Brand: MEDLINE

## (undated) DEVICE — SCRUB DRY SURG EZ SCRUB BRUSH PREOPERATIVE GRN

## (undated) DEVICE — SUTURE FIBERWIRE SZ 2 W/ TAPERED NEEDLE BLUE L38IN NONABSORB BLU L26.5MM 1/2 CIRCLE AR7200

## (undated) DEVICE — BLADE SAW OSCILLATING 85X19X2 MM ROBOTIC-ASSISTED VELYS

## (undated) DEVICE — SUTURE VCRL 1 L27IN ABSRB CT BRAID COAT UD J281H

## (undated) DEVICE — TOTAL JOINT - SMH: Brand: MEDLINE INDUSTRIES, INC.

## (undated) DEVICE — 3M™ TEGADERM™ TRANSPARENT FILM DRESSING FRAME STYLE, 1624W, 2-3/8 IN X 2-3/4 IN (6 CM X 7 CM), 100/CT 4CT/CASE: Brand: 3M™ TEGADERM™

## (undated) DEVICE — PADDING CAST SPEC 6INX4YD COT --

## (undated) DEVICE — BLADE SAW W098XL354IN THK0047IN CUT THK0047IN SAG

## (undated) DEVICE — SUTURE MONOCRYL STRATAFIX SPRL + 3 0 SGL ARMED PS 1 24 IN LEN SXMP1B103

## (undated) DEVICE — APPLICATOR MEDICATED 26 CC SOLUTION HI LT ORNG CHLORAPREP

## (undated) DEVICE — C-WIRE PAK DOUBLE ENDED ORTHOPAEDIC WIRE, TROCAR, .062" (1.57 MM): Brand: C-WIRE

## (undated) DEVICE — STERILE POLYISOPRENE POWDER-FREE SURGICAL GLOVES: Brand: PROTEXIS

## (undated) DEVICE — SOLUTION IRRIG 1000ML STRL H2O USP PLAS POUR BTL

## (undated) DEVICE — SYRINGE MED 20ML STD CLR PLAS LUERLOCK TIP N CTRL DISP

## (undated) DEVICE — GARMENT,MEDLINE,DVT,INT,CALF,MED, GEN2: Brand: MEDLINE

## (undated) DEVICE — FRAZIER SUCTION INSTRUMENT 12 FR W/CONTROL VENT & OBTURATOR: Brand: FRAZIER

## (undated) DEVICE — GRADUATED BOWL: Brand: DEVON

## (undated) DEVICE — 3M™ IOBAN™ 2 ANTIMICROBIAL INCISE DRAPE 6651EZ: Brand: IOBAN™ 2

## (undated) DEVICE — PADDING CST 6IN STERILE --

## (undated) DEVICE — STRAP,POSITIONING,KNEE/BODY,FOAM,4X60": Brand: MEDLINE

## (undated) DEVICE — NEEDLE HYPO 21GA L1.5IN INTRAMUSCULAR S STL LATCH BVL UP

## (undated) DEVICE — SOLUTION IV 1000ML 0.9% SOD CHL

## (undated) DEVICE — CONTAINER,SPECIMEN,4OZ,OR STRL: Brand: MEDLINE

## (undated) DEVICE — GLOVE SURG SZ 8 L12IN FNGR THK94MIL STD WHT LTX FREE

## (undated) DEVICE — TUBING SUCT 12FR MAL ALUM SHFT FN CAP VENT UNIV CONN W/ OBT

## (undated) DEVICE — BASIN EMSIS 16OZ GRAPHITE PLAS KID SHP MOLD GRAD FOR ORAL

## (undated) DEVICE — SUTURE STRATAFIX SYMMETRIC PDS + SZ 1 L18IN ABSRB VLT L48MM SXPP1A400

## (undated) DEVICE — DEVON™ KNEE AND BODY STRAP 60" X 3" (1.5 M X 7.6 CM): Brand: DEVON

## (undated) DEVICE — ELECTRODE PT RET AD L9FT HI MOIST COND ADH HYDRGEL CORDED

## (undated) DEVICE — GLOVE ORTHO 8   MSG9480

## (undated) DEVICE — SNARE ENDOSCP M L240CM W27MM SHTH DIA2.4MM CHN 2.8MM OVL

## (undated) DEVICE — Device

## (undated) DEVICE — SOLUTION IRRIG 1000ML H2O STRL BLT

## (undated) DEVICE — DRAPE,U/ SHT,SPLIT,PLAS,STERIL: Brand: MEDLINE

## (undated) DEVICE — STERILE POLYISOPRENE POWDER-FREE SURGICAL GLOVES WITH EMOLLIENT COATING: Brand: PROTEXIS

## (undated) DEVICE — SOLUTION WND IRRIGATION 450 ML 0.5 PVP-I 0.9 NACL

## (undated) DEVICE — SCRUBIN SCRUB BRUSH DRY STER: Brand: MEDLINE INDUSTRIES, INC.

## (undated) DEVICE — HOOD, PEEL-AWAY: Brand: FLYTE

## (undated) DEVICE — CONTINU-FLO SOLUTION SET, 2 INJECTION SITES, MALE LUER LOCK ADAPTER WITH RETRACTABLE COLLAR, LARGE BORE STOPCOCK WITH ROTATING MALE LUER LOCK EXTENSION SET, 2 INJECTION SITES, MALE LUER LOCK ADAPTER WITH RETRACTABLE COLLAR: Brand: INTERLINK/CONTINU-FLO

## (undated) DEVICE — SYRINGE 50ML E/T

## (undated) DEVICE — CUFF BLD PRSS AD CLTH SGL TB W/ BAYNT CONN ROUNDED CORNER

## (undated) DEVICE — SUTURE VICRYL 1 L27IN ABSRB CT BRAID COAT UD J281H

## (undated) DEVICE — SPONGE GZ W4XL4IN COT 12 PLY TYP VII WVN C FLD DSGN

## (undated) DEVICE — SYRINGE 20ML LL S/C 50

## (undated) DEVICE — SUTURE MCRYL SZ 4-0 L27IN ABSRB UD L24MM PS-1 3/8 CIR PRIM Y935H

## (undated) DEVICE — PENCIL SMK EVAC L10FT DIA95MM TBNG NONSTICK W ADPT TO 22MM

## (undated) DEVICE — GLOVE ORANGE PI 8 1/2   MSG9085

## (undated) DEVICE — TRANSFER SET 3": Brand: MEDLINE INDUSTRIES, INC.

## (undated) DEVICE — HYPODERMIC SAFETY NEEDLE: Brand: MAGELLAN

## (undated) DEVICE — SOL IRR SOD CHL 0.9% TITAN XL CNTNR 3000ML

## (undated) DEVICE — HOOK LOCK LATEX FREE ELASTIC BANDAGE D/L 6INX10YD

## (undated) DEVICE — SPONGE LAP 18X18IN STRL -- 5/PK

## (undated) DEVICE — TRAP SPEC COLL POLYP POLYSTYR --

## (undated) DEVICE — SINGLE-USE BIOPSY FORCEPS: Brand: RADIAL JAW 4

## (undated) DEVICE — KENDALL DL ECG CABLE AND LEAD WIRE SYSTEM, 3-LEAD, SINGLE PATIENT USE: Brand: KENDALL

## (undated) DEVICE — ATTUNE SOLO PINNING SYSTEM

## (undated) DEVICE — APPLICATOR BNDG 1MM ADH PREMIERPRO EXOFIN

## (undated) DEVICE — ENDO CARRY-ON PROCEDURE KIT INCLUDES ENZYMATIC SPONGE, GAUZE, BIOHAZARD LABEL, TRAY, LUBRICANT, DIRTY SCOPE LABEL, WATER LABEL, TRAY, DRAWSTRING PAD, AND DEFENDO 4-PIECE KIT.: Brand: ENDO CARRY-ON PROCEDURE KIT

## (undated) DEVICE — SOLUTION LACTATED RINGERS INJECTION USP

## (undated) DEVICE — KIT,1200CC CANISTER,3/16"X6' TUBING: Brand: MEDLINE INDUSTRIES, INC.

## (undated) DEVICE — DRAPE SURG KNEE 1 MIN SET ESYSUIT

## (undated) DEVICE — TRAY CATH 16F URIN MTR LTX -- CONVERT TO ITEM 363111

## (undated) DEVICE — DEVICE TRNSF SPIK STL 2008S] MICROTEK MEDICAL INC]

## (undated) DEVICE — SUTURE ABS MF 2-0 CT1 27IN STRATAFIX PDS+ SXPP1B412

## (undated) DEVICE — CATH KT URETH INTMIT 15FR LTX -- CONVERT TO ITEM 363176

## (undated) DEVICE — DRSG AQUACEL SURG 3.5 X 10IN -- CONVERT TO ITEM 370183

## (undated) DEVICE — ADHESIVE SKIN CLOSURE WND 8.661X1.5 IN 22 CM LIQUIBAND SECUR

## (undated) DEVICE — PADDING CAST W6INXL4YD NONSTERILE COT RAYON MICROPLEATED

## (undated) DEVICE — IV START KIT: Brand: MEDLINE

## (undated) DEVICE — YANKAUER,FLEXIBLE HANDLE,REGLR CAPACITY: Brand: MEDLINE INDUSTRIES, INC.

## (undated) DEVICE — MARKER,SKIN,WI/RULER AND LABELS: Brand: MEDLINE

## (undated) DEVICE — INSTRUMENT KIT SHLDR HEX PIN GPS